# Patient Record
Sex: FEMALE | Race: WHITE | NOT HISPANIC OR LATINO | Employment: OTHER | URBAN - METROPOLITAN AREA
[De-identification: names, ages, dates, MRNs, and addresses within clinical notes are randomized per-mention and may not be internally consistent; named-entity substitution may affect disease eponyms.]

---

## 2017-10-18 ENCOUNTER — HISTORICAL (OUTPATIENT)
Dept: RADIOLOGY | Facility: HOSPITAL | Age: 32
End: 2017-10-18

## 2017-12-15 ENCOUNTER — HISTORICAL (OUTPATIENT)
Dept: BARIATRICS | Facility: HOSPITAL | Age: 32
End: 2017-12-15

## 2017-12-15 ENCOUNTER — HISTORICAL (OUTPATIENT)
Dept: PREADMISSION TESTING | Facility: HOSPITAL | Age: 32
End: 2017-12-15

## 2017-12-15 LAB — H PYLORI AB SER IA-ACNC: NEGATIVE

## 2018-03-15 ENCOUNTER — HISTORICAL (OUTPATIENT)
Dept: SURGERY | Facility: HOSPITAL | Age: 33
End: 2018-03-15

## 2018-03-20 ENCOUNTER — HISTORICAL (OUTPATIENT)
Dept: LAB | Facility: HOSPITAL | Age: 33
End: 2018-03-20

## 2018-03-20 ENCOUNTER — HISTORICAL (OUTPATIENT)
Dept: BARIATRICS | Facility: HOSPITAL | Age: 33
End: 2018-03-20

## 2018-03-20 LAB
ABS NEUT (OLG): 5.35 X10(3)/MCL (ref 2.1–9.2)
ALBUMIN SERPL-MCNC: 3.6 GM/DL (ref 3.4–5)
ALBUMIN/GLOB SERPL: 0.9 {RATIO}
ALP SERPL-CCNC: 87 UNIT/L (ref 38–126)
ALT SERPL-CCNC: 73 UNIT/L (ref 12–78)
APTT PPP: 33.6 SECOND(S) (ref 24.8–36.9)
AST SERPL-CCNC: 59 UNIT/L (ref 15–37)
BASOPHILS # BLD AUTO: 0 X10(3)/MCL (ref 0–0.2)
BASOPHILS NFR BLD AUTO: 0 %
BILIRUB SERPL-MCNC: 0.6 MG/DL (ref 0.2–1)
BILIRUBIN DIRECT+TOT PNL SERPL-MCNC: 0.1 MG/DL (ref 0–0.5)
BILIRUBIN DIRECT+TOT PNL SERPL-MCNC: 0.5 MG/DL (ref 0–0.8)
BUN SERPL-MCNC: 10 MG/DL (ref 7–18)
CALCIUM SERPL-MCNC: 9 MG/DL (ref 8.5–10.1)
CHLORIDE SERPL-SCNC: 106 MMOL/L (ref 98–107)
CO2 SERPL-SCNC: 23 MMOL/L (ref 21–32)
CREAT SERPL-MCNC: 0.63 MG/DL (ref 0.55–1.02)
EOSINOPHIL # BLD AUTO: 0 X10(3)/MCL (ref 0–0.9)
EOSINOPHIL NFR BLD AUTO: 0 %
ERYTHROCYTE [DISTWIDTH] IN BLOOD BY AUTOMATED COUNT: 12.3 % (ref 11.5–17)
GLOBULIN SER-MCNC: 3.8 GM/DL (ref 2.4–3.5)
GLUCOSE SERPL-MCNC: 92 MG/DL (ref 74–106)
HCT VFR BLD AUTO: 41.3 % (ref 37–47)
HGB BLD-MCNC: 13.1 GM/DL (ref 12–16)
LYMPHOCYTES # BLD AUTO: 2.1 X10(3)/MCL (ref 0.6–4.6)
LYMPHOCYTES NFR BLD AUTO: 27 %
MCH RBC QN AUTO: 30.1 PG (ref 27–31)
MCHC RBC AUTO-ENTMCNC: 31.7 GM/DL (ref 33–36)
MCV RBC AUTO: 94.9 FL (ref 80–94)
MONOCYTES # BLD AUTO: 0.3 X10(3)/MCL (ref 0.1–1.3)
MONOCYTES NFR BLD AUTO: 4 %
NEUTROPHILS # BLD AUTO: 5.35 X10(3)/MCL (ref 1.4–7.9)
NEUTROPHILS NFR BLD AUTO: 68 %
PLATELET # BLD AUTO: 284 X10(3)/MCL (ref 130–400)
PMV BLD AUTO: 10.1 FL (ref 9.4–12.4)
POTASSIUM SERPL-SCNC: 4.5 MMOL/L (ref 3.5–5.1)
PROT SERPL-MCNC: 7.4 GM/DL (ref 6.4–8.2)
RBC # BLD AUTO: 4.35 X10(6)/MCL (ref 4.2–5.4)
SODIUM SERPL-SCNC: 137 MMOL/L (ref 136–145)
WBC # SPEC AUTO: 7.9 X10(3)/MCL (ref 4.5–11.5)

## 2018-04-02 ENCOUNTER — HISTORICAL (OUTPATIENT)
Dept: BARIATRICS | Facility: HOSPITAL | Age: 33
End: 2018-04-02

## 2018-04-23 ENCOUNTER — HISTORICAL (OUTPATIENT)
Dept: BARIATRICS | Facility: HOSPITAL | Age: 33
End: 2018-04-23

## 2018-05-30 ENCOUNTER — HISTORICAL (OUTPATIENT)
Dept: PREADMISSION TESTING | Facility: HOSPITAL | Age: 33
End: 2018-05-30

## 2018-05-30 LAB
ABS NEUT (OLG): 3.12 X10(3)/MCL (ref 2.1–9.2)
ALBUMIN SERPL-MCNC: 3.5 GM/DL (ref 3.4–5)
ALBUMIN/GLOB SERPL: 1.1 {RATIO}
ALP SERPL-CCNC: 88 UNIT/L (ref 38–126)
ALT SERPL-CCNC: 62 UNIT/L (ref 12–78)
AST SERPL-CCNC: 33 UNIT/L (ref 15–37)
BASOPHILS # BLD AUTO: 0 X10(3)/MCL (ref 0–0.2)
BASOPHILS NFR BLD AUTO: 0 %
BILIRUB SERPL-MCNC: 0.5 MG/DL (ref 0.2–1)
BILIRUBIN DIRECT+TOT PNL SERPL-MCNC: 0.2 MG/DL (ref 0–0.2)
BILIRUBIN DIRECT+TOT PNL SERPL-MCNC: 0.3 MG/DL (ref 0–0.8)
BUN SERPL-MCNC: 6 MG/DL (ref 7–18)
CALCIUM SERPL-MCNC: 8.7 MG/DL (ref 8.5–10.1)
CHLORIDE SERPL-SCNC: 106 MMOL/L (ref 98–107)
CO2 SERPL-SCNC: 25 MMOL/L (ref 21–32)
CREAT SERPL-MCNC: 0.57 MG/DL (ref 0.55–1.02)
EOSINOPHIL # BLD AUTO: 0 X10(3)/MCL (ref 0–0.9)
EOSINOPHIL NFR BLD AUTO: 1 %
ERYTHROCYTE [DISTWIDTH] IN BLOOD BY AUTOMATED COUNT: 14.7 % (ref 11.5–17)
GLOBULIN SER-MCNC: 3.1 GM/DL (ref 2.4–3.5)
GLUCOSE SERPL-MCNC: 77 MG/DL (ref 74–106)
HCT VFR BLD AUTO: 42.2 % (ref 37–47)
HGB BLD-MCNC: 13 GM/DL (ref 12–16)
IRON SATN MFR SERPL: 26.4 % (ref 20–50)
IRON SERPL-MCNC: 71 MCG/DL (ref 50–175)
LYMPHOCYTES # BLD AUTO: 2.2 X10(3)/MCL (ref 0.6–4.6)
LYMPHOCYTES NFR BLD AUTO: 38 %
MCH RBC QN AUTO: 30.3 PG (ref 27–31)
MCHC RBC AUTO-ENTMCNC: 30.8 GM/DL (ref 33–36)
MCV RBC AUTO: 98.4 FL (ref 80–94)
MONOCYTES # BLD AUTO: 0.4 X10(3)/MCL (ref 0.1–1.3)
MONOCYTES NFR BLD AUTO: 7 %
NEUTROPHILS # BLD AUTO: 3.12 X10(3)/MCL (ref 2.1–9.2)
NEUTROPHILS NFR BLD AUTO: 53 %
PLATELET # BLD AUTO: 201 X10(3)/MCL (ref 130–400)
PMV BLD AUTO: 12.3 FL (ref 9.4–12.4)
POTASSIUM SERPL-SCNC: 4.6 MMOL/L (ref 3.5–5.1)
PROT SERPL-MCNC: 6.6 GM/DL (ref 6.4–8.2)
RBC # BLD AUTO: 4.29 X10(6)/MCL (ref 4.2–5.4)
SODIUM SERPL-SCNC: 142 MMOL/L (ref 136–145)
TIBC SERPL-MCNC: 269 MCG/DL (ref 250–450)
TRANSFERRIN SERPL-MCNC: 218 MG/DL (ref 200–360)
VIT B12 SERPL-MCNC: 728 PG/ML (ref 193–986)
WBC # SPEC AUTO: 5.9 X10(3)/MCL (ref 4.5–11.5)

## 2018-06-11 ENCOUNTER — HISTORICAL (OUTPATIENT)
Dept: BARIATRICS | Facility: HOSPITAL | Age: 33
End: 2018-06-11

## 2018-08-10 ENCOUNTER — HISTORICAL (OUTPATIENT)
Dept: BARIATRICS | Facility: HOSPITAL | Age: 33
End: 2018-08-10

## 2018-08-29 ENCOUNTER — HISTORICAL (OUTPATIENT)
Dept: PREADMISSION TESTING | Facility: HOSPITAL | Age: 33
End: 2018-08-29

## 2018-08-29 LAB
ALBUMIN SERPL-MCNC: 3.3 GM/DL (ref 3.4–5)
ALBUMIN/GLOB SERPL: 0.9 RATIO (ref 1.1–2)
ALP SERPL-CCNC: 68 UNIT/L (ref 38–126)
ALT SERPL-CCNC: 32 UNIT/L (ref 12–78)
AST SERPL-CCNC: 25 UNIT/L (ref 15–37)
BILIRUB SERPL-MCNC: 0.5 MG/DL (ref 0.2–1)
BILIRUBIN DIRECT+TOT PNL SERPL-MCNC: 0.2 MG/DL (ref 0–0.5)
BILIRUBIN DIRECT+TOT PNL SERPL-MCNC: 0.3 MG/DL (ref 0–0.8)
BUN SERPL-MCNC: 9 MG/DL (ref 7–18)
CALCIUM SERPL-MCNC: 9.1 MG/DL (ref 8.5–10.1)
CHLORIDE SERPL-SCNC: 106 MMOL/L (ref 98–107)
CO2 SERPL-SCNC: 23 MMOL/L (ref 21–32)
CREAT SERPL-MCNC: 0.57 MG/DL (ref 0.55–1.02)
GLOBULIN SER-MCNC: 3.5 GM/DL (ref 2.4–3.5)
GLUCOSE SERPL-MCNC: 79 MG/DL (ref 74–106)
POTASSIUM SERPL-SCNC: 4.4 MMOL/L (ref 3.5–5.1)
PROT SERPL-MCNC: 6.8 GM/DL (ref 6.4–8.2)
SODIUM SERPL-SCNC: 139 MMOL/L (ref 136–145)

## 2018-10-15 ENCOUNTER — HISTORICAL (OUTPATIENT)
Dept: ADMINISTRATIVE | Facility: HOSPITAL | Age: 33
End: 2018-10-15

## 2018-10-15 LAB
ALBUMIN SERPL-MCNC: 3.2 GM/DL (ref 3.4–5)
ALBUMIN/GLOB SERPL: 1.1 {RATIO}
ALP SERPL-CCNC: 58 UNIT/L (ref 38–126)
ALT SERPL-CCNC: 25 UNIT/L (ref 12–78)
AST SERPL-CCNC: 21 UNIT/L (ref 15–37)
BILIRUB SERPL-MCNC: 0.3 MG/DL (ref 0.2–1)
BILIRUBIN DIRECT+TOT PNL SERPL-MCNC: 0.1 MG/DL (ref 0–0.2)
BILIRUBIN DIRECT+TOT PNL SERPL-MCNC: 0.2 MG/DL (ref 0–0.8)
BUN SERPL-MCNC: 10 MG/DL (ref 7–18)
CALCIUM SERPL-MCNC: 8.8 MG/DL (ref 8.5–10.1)
CHLORIDE SERPL-SCNC: 109 MMOL/L (ref 98–107)
CO2 SERPL-SCNC: 30 MMOL/L (ref 21–32)
CREAT SERPL-MCNC: 0.4 MG/DL (ref 0.55–1.02)
ERYTHROCYTE [DISTWIDTH] IN BLOOD BY AUTOMATED COUNT: 12.9 % (ref 11.5–17)
GLOBULIN SER-MCNC: 3 GM/DL (ref 2.4–3.5)
GLUCOSE SERPL-MCNC: 73 MG/DL (ref 74–106)
HCT VFR BLD AUTO: 36.4 % (ref 37–47)
HGB BLD-MCNC: 11.6 GM/DL (ref 12–16)
IRON SATN MFR SERPL: 19.6 % (ref 20–50)
IRON SERPL-MCNC: 50 MCG/DL (ref 50–175)
MCH RBC QN AUTO: 33 PG (ref 27–31)
MCHC RBC AUTO-ENTMCNC: 31.9 GM/DL (ref 33–36)
MCV RBC AUTO: 103.4 FL (ref 80–94)
PLATELET # BLD AUTO: 179 X10(3)/MCL (ref 130–400)
PMV BLD AUTO: 11.7 FL (ref 9.4–12.4)
POTASSIUM SERPL-SCNC: 4.6 MMOL/L (ref 3.5–5.1)
PROT SERPL-MCNC: 6.2 GM/DL (ref 6.4–8.2)
RBC # BLD AUTO: 3.52 X10(6)/MCL (ref 4.2–5.4)
SODIUM SERPL-SCNC: 145 MMOL/L (ref 136–145)
TIBC SERPL-MCNC: 255 MCG/DL (ref 250–450)
TRANSFERRIN SERPL-MCNC: 193 MG/DL (ref 200–360)
VIT B12 SERPL-MCNC: 2741 PG/ML (ref 193–986)
WBC # SPEC AUTO: 4.5 X10(3)/MCL (ref 4.5–11.5)

## 2018-10-23 ENCOUNTER — HISTORICAL (OUTPATIENT)
Dept: BARIATRICS | Facility: HOSPITAL | Age: 33
End: 2018-10-23

## 2019-01-11 ENCOUNTER — HISTORICAL (OUTPATIENT)
Dept: BARIATRICS | Facility: HOSPITAL | Age: 34
End: 2019-01-11

## 2019-03-26 ENCOUNTER — HISTORICAL (OUTPATIENT)
Dept: PREADMISSION TESTING | Facility: HOSPITAL | Age: 34
End: 2019-03-26

## 2019-03-26 LAB
ABS NEUT (OLG): 4.69 X10(3)/MCL (ref 2.1–9.2)
ALBUMIN SERPL-MCNC: 3.9 GM/DL (ref 3.4–5)
ALBUMIN/GLOB SERPL: 1.3 {RATIO}
ALP SERPL-CCNC: 56 UNIT/L (ref 38–126)
ALT SERPL-CCNC: 22 UNIT/L (ref 12–78)
AST SERPL-CCNC: 11 UNIT/L (ref 15–37)
BASOPHILS # BLD AUTO: 0 X10(3)/MCL (ref 0–0.2)
BASOPHILS NFR BLD AUTO: 0 %
BILIRUB SERPL-MCNC: 0.4 MG/DL (ref 0.2–1)
BILIRUBIN DIRECT+TOT PNL SERPL-MCNC: 0.1 MG/DL (ref 0–0.2)
BILIRUBIN DIRECT+TOT PNL SERPL-MCNC: 0.3 MG/DL (ref 0–0.8)
BUN SERPL-MCNC: 14 MG/DL (ref 7–18)
CALCIUM SERPL-MCNC: 9.2 MG/DL (ref 8.5–10.1)
CHLORIDE SERPL-SCNC: 109 MMOL/L (ref 98–107)
CO2 SERPL-SCNC: 28 MMOL/L (ref 21–32)
CREAT SERPL-MCNC: 0.68 MG/DL (ref 0.55–1.02)
EOSINOPHIL # BLD AUTO: 0 X10(3)/MCL (ref 0–0.9)
EOSINOPHIL NFR BLD AUTO: 0 %
ERYTHROCYTE [DISTWIDTH] IN BLOOD BY AUTOMATED COUNT: 12.2 % (ref 11.5–17)
GLOBULIN SER-MCNC: 3 GM/DL (ref 2.4–3.5)
GLUCOSE SERPL-MCNC: 71 MG/DL (ref 74–106)
HCT VFR BLD AUTO: 39.2 % (ref 37–47)
HGB BLD-MCNC: 12.6 GM/DL (ref 12–16)
IRON SATN MFR SERPL: 36.2 % (ref 20–50)
IRON SERPL-MCNC: 104 MCG/DL (ref 50–175)
LYMPHOCYTES # BLD AUTO: 1.9 X10(3)/MCL (ref 0.6–4.6)
LYMPHOCYTES NFR BLD AUTO: 27 %
MCH RBC QN AUTO: 33.2 PG (ref 27–31)
MCHC RBC AUTO-ENTMCNC: 32.1 GM/DL (ref 33–36)
MCV RBC AUTO: 103.4 FL (ref 80–94)
MONOCYTES # BLD AUTO: 0.3 X10(3)/MCL (ref 0.1–1.3)
MONOCYTES NFR BLD AUTO: 5 %
NEUTROPHILS # BLD AUTO: 4.69 X10(3)/MCL (ref 2.1–9.2)
NEUTROPHILS NFR BLD AUTO: 67 %
PLATELET # BLD AUTO: 219 X10(3)/MCL (ref 130–400)
PMV BLD AUTO: 11.3 FL (ref 9.4–12.4)
POTASSIUM SERPL-SCNC: 4.6 MMOL/L (ref 3.5–5.1)
PROT SERPL-MCNC: 6.9 GM/DL (ref 6.4–8.2)
RBC # BLD AUTO: 3.79 X10(6)/MCL (ref 4.2–5.4)
SODIUM SERPL-SCNC: 141 MMOL/L (ref 136–145)
TIBC SERPL-MCNC: 287 MCG/DL (ref 250–450)
TRANSFERRIN SERPL-MCNC: 224 MG/DL (ref 200–360)
VIT B12 SERPL-MCNC: 805 PG/ML (ref 193–986)
WBC # SPEC AUTO: 7 X10(3)/MCL (ref 4.5–11.5)

## 2019-04-08 ENCOUNTER — HISTORICAL (OUTPATIENT)
Dept: BARIATRICS | Facility: HOSPITAL | Age: 34
End: 2019-04-08

## 2019-10-08 ENCOUNTER — HISTORICAL (OUTPATIENT)
Dept: PREADMISSION TESTING | Facility: HOSPITAL | Age: 34
End: 2019-10-08

## 2019-10-08 LAB
ABS NEUT (OLG): 3.24 X10(3)/MCL (ref 2.1–9.2)
ALBUMIN SERPL-MCNC: 3.9 GM/DL (ref 3.4–5)
ALBUMIN/GLOB SERPL: 1.3 RATIO (ref 1.1–2)
ALP SERPL-CCNC: 48 UNIT/L (ref 38–126)
ALT SERPL-CCNC: 26 UNIT/L (ref 12–78)
AST SERPL-CCNC: 12 UNIT/L (ref 15–37)
BASOPHILS # BLD AUTO: 0 X10(3)/MCL (ref 0–0.2)
BASOPHILS NFR BLD AUTO: 0 %
BILIRUB SERPL-MCNC: 0.7 MG/DL (ref 0.2–1)
BILIRUBIN DIRECT+TOT PNL SERPL-MCNC: 0.2 MG/DL (ref 0–0.5)
BILIRUBIN DIRECT+TOT PNL SERPL-MCNC: 0.5 MG/DL (ref 0–0.8)
BUN SERPL-MCNC: 14 MG/DL (ref 7–18)
CALCIUM SERPL-MCNC: 9.3 MG/DL (ref 8.5–10.1)
CHLORIDE SERPL-SCNC: 104 MMOL/L (ref 98–107)
CO2 SERPL-SCNC: 30 MMOL/L (ref 21–32)
CREAT SERPL-MCNC: 0.57 MG/DL (ref 0.55–1.02)
EOSINOPHIL # BLD AUTO: 0 X10(3)/MCL (ref 0–0.9)
EOSINOPHIL NFR BLD AUTO: 0 %
ERYTHROCYTE [DISTWIDTH] IN BLOOD BY AUTOMATED COUNT: 12.3 % (ref 11.5–17)
GLOBULIN SER-MCNC: 2.9 GM/DL (ref 2.4–3.5)
GLUCOSE SERPL-MCNC: 81 MG/DL (ref 74–106)
HCT VFR BLD AUTO: 41.5 % (ref 37–47)
HGB BLD-MCNC: 13 GM/DL (ref 12–16)
IRON SATN MFR SERPL: 33.1 % (ref 20–50)
IRON SERPL-MCNC: 100 MCG/DL (ref 50–175)
LYMPHOCYTES # BLD AUTO: 2.2 X10(3)/MCL (ref 0.6–4.6)
LYMPHOCYTES NFR BLD AUTO: 37 %
MCH RBC QN AUTO: 31.5 PG (ref 27–31)
MCHC RBC AUTO-ENTMCNC: 31.3 GM/DL (ref 33–36)
MCV RBC AUTO: 100.5 FL (ref 80–94)
MONOCYTES # BLD AUTO: 0.3 X10(3)/MCL (ref 0.1–1.3)
MONOCYTES NFR BLD AUTO: 6 %
NEUTROPHILS # BLD AUTO: 3.24 X10(3)/MCL (ref 2.1–9.2)
NEUTROPHILS NFR BLD AUTO: 56 %
PLATELET # BLD AUTO: 212 X10(3)/MCL (ref 130–400)
PMV BLD AUTO: 10.3 FL (ref 9.4–12.4)
POTASSIUM SERPL-SCNC: 4.9 MMOL/L (ref 3.5–5.1)
PROT SERPL-MCNC: 6.8 GM/DL (ref 6.4–8.2)
RBC # BLD AUTO: 4.13 X10(6)/MCL (ref 4.2–5.4)
SODIUM SERPL-SCNC: 141 MMOL/L (ref 136–145)
TIBC SERPL-MCNC: 302 MCG/DL (ref 250–450)
TRANSFERRIN SERPL-MCNC: 236 MG/DL (ref 200–360)
VIT B12 SERPL-MCNC: 1068 PG/ML (ref 193–986)
WBC # SPEC AUTO: 5.8 X10(3)/MCL (ref 4.5–11.5)

## 2020-12-07 ENCOUNTER — HISTORICAL (OUTPATIENT)
Dept: SURGERY | Facility: HOSPITAL | Age: 35
End: 2020-12-07

## 2021-05-12 ENCOUNTER — HISTORICAL (OUTPATIENT)
Dept: PREADMISSION TESTING | Facility: HOSPITAL | Age: 36
End: 2021-05-12

## 2021-05-12 LAB
ABS NEUT (OLG): 3.62 X10(3)/MCL (ref 2.1–9.2)
ALBUMIN SERPL-MCNC: 4.3 GM/DL (ref 3.5–5)
ALBUMIN/GLOB SERPL: 1.4 RATIO (ref 1.1–2)
ALP SERPL-CCNC: 62 UNIT/L (ref 40–150)
ALT SERPL-CCNC: 20 UNIT/L (ref 0–55)
AST SERPL-CCNC: 17 UNIT/L (ref 5–34)
BASOPHILS # BLD AUTO: 0 X10(3)/MCL (ref 0–0.2)
BASOPHILS NFR BLD AUTO: 1 %
BILIRUB SERPL-MCNC: 0.8 MG/DL
BILIRUBIN DIRECT+TOT PNL SERPL-MCNC: 0.3 MG/DL (ref 0–0.5)
BILIRUBIN DIRECT+TOT PNL SERPL-MCNC: 0.5 MG/DL (ref 0–0.8)
BUN SERPL-MCNC: 14.7 MG/DL (ref 7–18.7)
CALCIUM SERPL-MCNC: 10 MG/DL (ref 8.4–10.2)
CHLORIDE SERPL-SCNC: 106 MMOL/L (ref 98–107)
CO2 SERPL-SCNC: 26 MMOL/L (ref 22–29)
CREAT SERPL-MCNC: 0.73 MG/DL (ref 0.55–1.02)
EOSINOPHIL # BLD AUTO: 0 X10(3)/MCL (ref 0–0.9)
EOSINOPHIL NFR BLD AUTO: 0 %
ERYTHROCYTE [DISTWIDTH] IN BLOOD BY AUTOMATED COUNT: 12.9 % (ref 11.5–17)
GLOBULIN SER-MCNC: 3 GM/DL (ref 2.4–3.5)
GLUCOSE SERPL-MCNC: 99 MG/DL (ref 74–100)
HCT VFR BLD AUTO: 44.2 % (ref 37–47)
HGB BLD-MCNC: 14.3 GM/DL (ref 12–16)
IRON SATN MFR SERPL: 44 % (ref 20–50)
IRON SERPL-MCNC: 138 UG/DL (ref 50–170)
LYMPHOCYTES # BLD AUTO: 2 X10(3)/MCL (ref 0.6–4.6)
LYMPHOCYTES NFR BLD AUTO: 33 %
MCH RBC QN AUTO: 33.5 PG (ref 27–31)
MCHC RBC AUTO-ENTMCNC: 32.4 GM/DL (ref 33–36)
MCV RBC AUTO: 103.5 FL (ref 80–94)
MONOCYTES # BLD AUTO: 0.4 X10(3)/MCL (ref 0.1–1.3)
MONOCYTES NFR BLD AUTO: 6 %
NEUTROPHILS # BLD AUTO: 3.62 X10(3)/MCL (ref 2.1–9.2)
NEUTROPHILS NFR BLD AUTO: 60 %
PLATELET # BLD AUTO: 301 X10(3)/MCL (ref 130–400)
PMV BLD AUTO: 9.4 FL (ref 9.4–12.4)
POTASSIUM SERPL-SCNC: 5 MMOL/L (ref 3.5–5.1)
PROT SERPL-MCNC: 7.3 GM/DL (ref 6.4–8.3)
RBC # BLD AUTO: 4.27 X10(6)/MCL (ref 4.2–5.4)
SODIUM SERPL-SCNC: 139 MMOL/L (ref 136–145)
TIBC SERPL-MCNC: 173 UG/DL (ref 70–310)
TIBC SERPL-MCNC: 311 UG/DL (ref 250–450)
TRANSFERRIN SERPL-MCNC: 274 MG/DL (ref 180–382)
VIT B12 SERPL-MCNC: 1160 PG/ML (ref 213–816)
WBC # SPEC AUTO: 6.1 X10(3)/MCL (ref 4.5–11.5)

## 2022-04-08 ENCOUNTER — HISTORICAL (OUTPATIENT)
Dept: ADMINISTRATIVE | Facility: HOSPITAL | Age: 37
End: 2022-04-08

## 2022-04-30 NOTE — OP NOTE
Patient:   Carol Edmond            MRN: [id number]            FIN: [id number]               Age:   32 years     Sex:  Female     :  1985   Associated Diagnoses:   Obesity; Hiatal hernia   Author:   Charly Gerard MD      Operative Note   Operative Information   Date/ Time:  3/15/2018 13:14:00.     Procedures Performed: EGD  (esophagogastroduodenoscopy).     Indications: preoperative bariatricu surgery workup  gerd.     Preoperative Diagnosis: Obesity (EMN08-UV E66.9).     Postoperative Diagnosis: Obesity (QJG23-NV E66.9), Hiatal hernia (SQM04-AQ K44.9).     Surgeon: Charly Gerard MD.     Anesthesia: mac.     Speciman Removed: none.     Description of Procedure/Findings/    Complications: pt laid in left lateral decubitus position and monitored sedation administered by anesthesia    enteroscope advance into patients mouth through bite block    esohagus normal appearing, normal mucosa    stomach appeared normal,     no gastric ulcers    scope then advaced in duodenum, duodenum normal appearing mucosa,    the stomach was inflated and retroflexed view revealed a 3 cm hiatal hernia      the air was suctioned out and the scope was withdrawn    pt taken to pacu in stable satisfactory condition..     Esimated blood loss: No blood loss.     Findings: 3cm hiatal hernia.     Complications: None.

## 2022-05-10 ENCOUNTER — HOSPITAL ENCOUNTER (EMERGENCY)
Facility: HOSPITAL | Age: 37
Discharge: HOME | End: 2022-05-11
Attending: EMERGENCY MEDICINE
Payer: COMMERCIAL

## 2022-05-10 DIAGNOSIS — M54.50 CHRONIC BILATERAL LOW BACK PAIN WITHOUT SCIATICA: Primary | ICD-10-CM

## 2022-05-10 DIAGNOSIS — G89.29 CHRONIC BILATERAL LOW BACK PAIN WITHOUT SCIATICA: Primary | ICD-10-CM

## 2022-05-10 PROCEDURE — 99285 EMERGENCY DEPT VISIT HI MDM: CPT

## 2022-05-10 RX ORDER — HYDROCODONE BITARTRATE AND ACETAMINOPHEN 10; 325 MG/1; MG/1
1 TABLET ORAL
Status: COMPLETED | OUTPATIENT
Start: 2022-05-10 | End: 2022-05-11

## 2022-05-10 NOTE — FIRST PROVIDER EVALUATION
Medical screening exam completed.  I have conducted a focused provider triage encounter, findings are as follows:    Brief history of present illness:  States lower back pain. States recent back surgery.     There were no vitals filed for this visit.    Pertinent physical exam:  Awake, alert, ambulatory    Brief workup plan:  ct    Preliminary workup initiated; this workup will be continued and followed by the physician or advanced practice provider that is assigned to the patient when roomed.

## 2022-05-11 VITALS
RESPIRATION RATE: 17 BRPM | TEMPERATURE: 98 F | OXYGEN SATURATION: 99 % | HEIGHT: 64 IN | WEIGHT: 190 LBS | SYSTOLIC BLOOD PRESSURE: 130 MMHG | HEART RATE: 75 BPM | BODY MASS INDEX: 32.44 KG/M2 | DIASTOLIC BLOOD PRESSURE: 90 MMHG

## 2022-05-11 PROCEDURE — 96372 THER/PROPH/DIAG INJ SC/IM: CPT | Performed by: NURSE PRACTITIONER

## 2022-05-11 PROCEDURE — 63600175 PHARM REV CODE 636 W HCPCS: Performed by: NURSE PRACTITIONER

## 2022-05-11 PROCEDURE — 25000003 PHARM REV CODE 250: Performed by: NURSE PRACTITIONER

## 2022-05-11 RX ORDER — HYDROCODONE BITARTRATE AND ACETAMINOPHEN 5; 325 MG/1; MG/1
1 TABLET ORAL EVERY 6 HOURS PRN
Qty: 15 TABLET | Refills: 0 | Status: SHIPPED | OUTPATIENT
Start: 2022-05-11 | End: 2022-07-03 | Stop reason: ALTCHOICE

## 2022-05-11 RX ORDER — TRAMADOL HYDROCHLORIDE 50 MG/1
50 TABLET ORAL EVERY 6 HOURS PRN
Qty: 12 TABLET | Refills: 0 | Status: SHIPPED | OUTPATIENT
Start: 2022-05-11 | End: 2022-05-11 | Stop reason: ALTCHOICE

## 2022-05-11 RX ORDER — HYDROCODONE BITARTRATE AND ACETAMINOPHEN 5; 325 MG/1; MG/1
1 TABLET ORAL EVERY 6 HOURS PRN
Qty: 14 TABLET | Refills: 0 | Status: SHIPPED | OUTPATIENT
Start: 2022-05-11 | End: 2022-05-11 | Stop reason: SDUPTHER

## 2022-05-11 RX ORDER — HYDROMORPHONE HYDROCHLORIDE 2 MG/ML
1 INJECTION, SOLUTION INTRAMUSCULAR; INTRAVENOUS; SUBCUTANEOUS
Status: COMPLETED | OUTPATIENT
Start: 2022-05-11 | End: 2022-05-11

## 2022-05-11 RX ORDER — CYCLOBENZAPRINE HCL 10 MG
10 TABLET ORAL 3 TIMES DAILY PRN
Qty: 15 TABLET | Refills: 0 | Status: SHIPPED | OUTPATIENT
Start: 2022-05-11 | End: 2022-05-16

## 2022-05-11 RX ADMIN — HYDROCODONE BITARTRATE AND ACETAMINOPHEN 1 TABLET: 10; 325 TABLET ORAL at 12:05

## 2022-05-11 RX ADMIN — HYDROMORPHONE HYDROCHLORIDE 1 MG: 2 INJECTION INTRAMUSCULAR; INTRAVENOUS; SUBCUTANEOUS at 12:05

## 2022-05-11 NOTE — ED NOTES
Pt to ED with complaints of 10/10 lower back pain; recent back Sx to replace disc in lower back. Pt reports increased pain post op. Seen by neurologist on 05/10/2022, told needs an MRI (has not been scheduled yet). Pt had appointment with pain management, but not until 05/19/2022. Called PCP as well to try to get pain medication, advised to come to ED. Denies bladder/bowel incontinence. Pt able to walk WNL. Pt aaox4, GRACE: pt updated on plan of care, verbalizes understanding

## 2022-05-11 NOTE — ED PROVIDER NOTES
Encounter Date: 5/10/2022       History     Chief Complaint   Patient presents with    Back Pain     Patient reports lower back pain, spinal fusion March 22 Dr Greer, sent from his office, here for pain control, seen at [facility] yesterday     Patient here with lower back pain. She had surgery on her back 3/7/22.     The history is provided by the patient. No  was used.   Back Pain   This is a recurrent problem. The current episode started several weeks ago. The problem occurs constantly. The problem has been unchanged. The pain is associated with falling. The pain is present in the lumbar spine. The quality of the pain is described as shooting. The pain radiates to the left leg and right leg. The pain is at a severity of 8/10. The symptoms are aggravated by certain positions. Stiffness is present all day. Pertinent negatives include no chest pain, no fever, no headaches, no abdominal pain and no dysuria. She has tried nothing for the symptoms. The treatment provided no relief. Risk factors include obesity.     Review of patient's allergies indicates:   Allergen Reactions    Nsaids (non-steroidal anti-inflammatory drug)      Past Medical History:   Diagnosis Date    H/O gastric sleeve      Past Surgical History:   Procedure Laterality Date    BACK SURGERY      SPINAL FUSION       History reviewed. No pertinent family history.  Social History     Tobacco Use    Smoking status: Never Smoker    Smokeless tobacco: Never Used   Substance Use Topics    Alcohol use: Not Currently     Review of Systems   Constitutional: Negative for fever.   Respiratory: Negative for cough and shortness of breath.    Cardiovascular: Negative for chest pain.   Gastrointestinal: Negative for abdominal pain.   Genitourinary: Negative for difficulty urinating and dysuria.   Musculoskeletal: Positive for back pain. Negative for gait problem.   Skin: Negative for color change.   Neurological: Negative for dizziness,  speech difficulty and headaches.   Psychiatric/Behavioral: Negative for hallucinations and suicidal ideas.   All other systems reviewed and are negative.      Physical Exam     Initial Vitals [05/10/22 1421]   BP Pulse Resp Temp SpO2   127/72 90 18 98.4 °F (36.9 °C) 100 %      MAP       --         Physical Exam    Nursing note and vitals reviewed.  Constitutional: She appears well-developed and well-nourished.   HENT:   Head: Normocephalic.   Eyes: EOM are normal.   Neck:   Normal range of motion.  Cardiovascular: Normal rate, regular rhythm and intact distal pulses.   Pulmonary/Chest: Breath sounds normal. No respiratory distress.   Abdominal: Abdomen is soft. Bowel sounds are normal. There is no abdominal tenderness.   Musculoskeletal:         General: Normal range of motion.      Cervical back: Normal range of motion.     Neurological: She is alert and oriented to person, place, and time. She has normal strength and normal reflexes.   Skin: Skin is warm and dry.   Psychiatric: She has a normal mood and affect. Her behavior is normal. Judgment and thought content normal.         ED Course   Procedures  Labs Reviewed   HCG QUALITATIVE URINE          Imaging Results          CT Lumbar Spine Without Contrast (Preliminary result)  Result time 05/11/22 02:12:39    Preliminary result by Sung Vann Jr., MD (05/11/22 02:09:37)                 Narrative:    START OF REPORT:  Technique: CT of the lumbar spine was performed without intravenous contrast with direct axial as well as sagittal and coronal reconstruction images.    Comparison: None.    Clinical history: Fall previous back surgery.    Findings:  Anatomy: Unremarkable.  Mineralization: The bony mineralization is within normal limits.  Bone alignment: Unremarkable with no listhesis.  Curvature: Lumbar lordosis is maintained.  Bone and bone marrow: Vertebral body heights are maintained.  Intervertebral disc spaces: Preserved throughout.  Osteophytes: Small  anterior marginal osteophytes at T12 down through L5.  Facet degenerative changes: Multilevel facet degenerative changes are seen.  Spinal canal:  Fractures: No acute fracture dislocation or subluxation is seen.  Orthopedic Hardware: An interbody cage device is seen at L5-S1.      Impression:  1. No acute fracture dislocation or subluxation is seen.  2. Details and other findings as noted above.                                   Medications   HYDROcodone-acetaminophen  mg per tablet 1 tablet (1 tablet Oral Given 5/11/22 0008)   HYDROmorphone (PF) injection 1 mg (1 mg Intramuscular Given 5/11/22 0020)                 ED Course as of 05/11/22 0347   Wed May 11, 2022   0051 Care transferred to Dr Woods [CL]   0106 CT Lumbar Spine Without Contrast [KG]   0107 Patient states pain is much improved after pain meds given. [KG]   0107 Patient has no difficulty moving bilateral lower extremities, sensation is intact to both lower extremities.  Patient is in no apparent distress. [KG]      ED Course User Index  [CL] PHILIPPE Roa  [KG] Junior Woods MD             Clinical Impression:   Final diagnoses:  [M54.50, G89.29] Chronic bilateral low back pain without sciatica (Primary)          ED Disposition Condition    Discharge Stable        ED Prescriptions     Medication Sig Dispense Start Date End Date Auth. Provider    traMADoL (ULTRAM) 50 mg tablet  (Status: Discontinued) Take 1 tablet (50 mg total) by mouth every 6 (six) hours as needed for Pain. 12 tablet 5/11/2022 5/11/2022 Junoir Woods MD    cyclobenzaprine (FLEXERIL) 10 MG tablet Take 1 tablet (10 mg total) by mouth 3 (three) times daily as needed for Muscle spasms. 15 tablet 5/11/2022 5/16/2022 Junior Woods MD    HYDROcodone-acetaminophen (NORCO) 5-325 mg per tablet Take 1 tablet by mouth every 6 (six) hours as needed for Pain. 14 tablet 5/11/2022  Junior Woods MD        Follow-up Information     Follow up With Specialties Details  Why Contact Dasia Guzman MD Family Medicine Schedule an appointment as soon as possible for a visit   [address] [place] [id number] [phone]             Junior Woods MD  05/11/22 8746

## 2022-05-13 ENCOUNTER — HOSPITAL ENCOUNTER (EMERGENCY)
Facility: HOSPITAL | Age: 37
Discharge: HOME | End: 2022-05-13
Attending: INTERNAL MEDICINE
Payer: COMMERCIAL

## 2022-05-13 VITALS
SYSTOLIC BLOOD PRESSURE: 125 MMHG | HEIGHT: 65 IN | HEART RATE: 105 BPM | BODY MASS INDEX: 36.65 KG/M2 | WEIGHT: 220 LBS | OXYGEN SATURATION: 98 % | RESPIRATION RATE: 18 BRPM | DIASTOLIC BLOOD PRESSURE: 87 MMHG | TEMPERATURE: 98 F

## 2022-05-13 DIAGNOSIS — G89.29 CHRONIC BILATERAL LOW BACK PAIN WITH LEFT-SIDED SCIATICA: ICD-10-CM

## 2022-05-13 DIAGNOSIS — M54.42 CHRONIC BILATERAL LOW BACK PAIN WITH LEFT-SIDED SCIATICA: ICD-10-CM

## 2022-05-13 DIAGNOSIS — G89.18 POST-OPERATIVE PAIN: ICD-10-CM

## 2022-05-13 DIAGNOSIS — G89.29 CHRONIC LEFT-SIDED LOW BACK PAIN WITH LEFT-SIDED SCIATICA: Primary | ICD-10-CM

## 2022-05-13 DIAGNOSIS — M54.42 CHRONIC LEFT-SIDED LOW BACK PAIN WITH LEFT-SIDED SCIATICA: Primary | ICD-10-CM

## 2022-05-13 PROCEDURE — 99284 EMERGENCY DEPT VISIT MOD MDM: CPT

## 2022-05-13 PROCEDURE — 96372 THER/PROPH/DIAG INJ SC/IM: CPT | Performed by: NURSE PRACTITIONER

## 2022-05-13 PROCEDURE — 63600175 PHARM REV CODE 636 W HCPCS: Performed by: NURSE PRACTITIONER

## 2022-05-13 PROCEDURE — 25000003 PHARM REV CODE 250: Performed by: NURSE PRACTITIONER

## 2022-05-13 RX ORDER — HYDROMORPHONE HYDROCHLORIDE 2 MG/ML
1 INJECTION, SOLUTION INTRAMUSCULAR; INTRAVENOUS; SUBCUTANEOUS
Status: COMPLETED | OUTPATIENT
Start: 2022-05-13 | End: 2022-05-13

## 2022-05-13 RX ORDER — BUSPIRONE HYDROCHLORIDE 10 MG/1
10 TABLET ORAL 2 TIMES DAILY
COMMUNITY
Start: 2022-04-04 | End: 2022-07-19

## 2022-05-13 RX ORDER — GABAPENTIN 400 MG/1
400 CAPSULE ORAL EVERY 4 HOURS
COMMUNITY
Start: 2022-05-01 | End: 2022-06-26

## 2022-05-13 RX ORDER — METHOCARBAMOL 500 MG/1
1000 TABLET, FILM COATED ORAL
Status: COMPLETED | OUTPATIENT
Start: 2022-05-13 | End: 2022-05-13

## 2022-05-13 RX ORDER — LIDOCAINE 50 MG/G
PATCH TOPICAL
COMMUNITY
Start: 2021-12-08 | End: 2022-07-03

## 2022-05-13 RX ORDER — METAXALONE 800 MG/1
800 TABLET ORAL 3 TIMES DAILY PRN
COMMUNITY
Start: 2022-05-10 | End: 2022-06-26

## 2022-05-13 RX ORDER — DULOXETIN HYDROCHLORIDE 60 MG/1
60 CAPSULE, DELAYED RELEASE ORAL DAILY
COMMUNITY
Start: 2022-04-04 | End: 2023-07-13 | Stop reason: ALTCHOICE

## 2022-05-13 RX ORDER — HYDROCODONE BITARTRATE AND ACETAMINOPHEN 10; 325 MG/1; MG/1
1 TABLET ORAL EVERY 6 HOURS PRN
Qty: 20 TABLET | Refills: 0 | Status: SHIPPED | OUTPATIENT
Start: 2022-05-13 | End: 2022-06-02

## 2022-05-13 RX ORDER — DULOXETIN HYDROCHLORIDE 30 MG/1
30 CAPSULE, DELAYED RELEASE ORAL DAILY
COMMUNITY
Start: 2022-04-04 | End: 2023-07-13 | Stop reason: ALTCHOICE

## 2022-05-13 RX ORDER — OXYCODONE AND ACETAMINOPHEN 5; 325 MG/1; MG/1
1 TABLET ORAL
Status: COMPLETED | OUTPATIENT
Start: 2022-05-13 | End: 2022-05-13

## 2022-05-13 RX ORDER — TRAZODONE HYDROCHLORIDE 50 MG/1
50 TABLET ORAL NIGHTLY
COMMUNITY
Start: 2022-04-04 | End: 2023-07-13 | Stop reason: ALTCHOICE

## 2022-05-13 RX ADMIN — OXYCODONE HYDROCHLORIDE AND ACETAMINOPHEN 1 TABLET: 5; 325 TABLET ORAL at 09:05

## 2022-05-13 RX ADMIN — HYDROMORPHONE HYDROCHLORIDE 1 MG: 2 INJECTION, SOLUTION INTRAMUSCULAR; INTRAVENOUS; SUBCUTANEOUS at 08:05

## 2022-05-13 RX ADMIN — METHOCARBAMOL 1000 MG: 500 TABLET ORAL at 08:05

## 2022-05-14 NOTE — DISCHARGE INSTRUCTIONS
Continue to take your medications prescribed the way they are prescribed at home. Wear your brace. Keep scheduled follow up with dr. Morfin on Thursday. Call to verify when MRI is with dr. Urena office. May take the norco 10mg instead of the 5mg but use sparingly and only for very severe pain

## 2022-05-14 NOTE — ED PROVIDER NOTES
Encounter Date: 5/13/2022       History     Chief Complaint   Patient presents with    Back Pain     REPORTS BACK PAIN; HX OF SPINAL FUSION MARCH 7TH DR LINN     35 y/o female who presents with c/o continued low back pain since she had surgery (lumbar fusion) with Dr. Linn 3/5/22. She has fall 3 times since the surgery with last episode being Monday. Since Monday she has been seen at two hospitals and by Dr. Linn. She is awaiting MRI per Dr. Linn office to schedule and pain management appt this Thursday with Dr. Morfin. She was seen couple days ago and had CT lumbar without acute findings noted, given pain meds while in ER and discharged with norco 5mg and she is out of these. She is on skelaxin for muscle relaxers and still taking gabapentin. She states her pain has never gotten better since surgery. Her surgical sites are healed well. No fever. No loss of bowel/bladder. She still has some intermittent paresthesia in her legs which isn't new.     The history is provided by the patient. No  was used.   Back Pain   This is a recurrent problem. The current episode started several weeks ago. The problem occurs constantly. The problem has been waxing and waning. The pain is present in the lumbar spine. The quality of the pain is described as stabbing. The pain radiates to the left leg. The pain is at a severity of 8/10. The symptoms are aggravated by certain positions. She has tried analgesics, NSAIDs and muscle relaxants for the symptoms. The treatment provided mild relief. Risk factors include obesity.     Review of patient's allergies indicates:   Allergen Reactions    Nsaids (non-steroidal anti-inflammatory drug)      Past Medical History:   Diagnosis Date    H/O gastric sleeve      Past Surgical History:   Procedure Laterality Date    BACK SURGERY      SPINAL FUSION       No family history on file.  Social History     Tobacco Use    Smoking status: Never Smoker    Smokeless  tobacco: Never Used   Substance Use Topics    Alcohol use: Not Currently     Review of Systems   Respiratory: Negative.    Cardiovascular: Negative.    Gastrointestinal: Negative.    Musculoskeletal: Positive for back pain.   Skin: Negative.    Neurological: Negative.    All other systems reviewed and are negative.      Physical Exam     Initial Vitals [05/13/22 1857]   BP Pulse Resp Temp SpO2   125/87 105 18 98 °F (36.7 °C) 98 %      MAP       --         Physical Exam    Nursing note and vitals reviewed.  Constitutional: She appears well-developed and well-nourished.   Eyes: Conjunctivae are normal.   Neck:   Normal range of motion.  Cardiovascular: Normal rate, regular rhythm, normal heart sounds and intact distal pulses.   Musculoskeletal:      Cervical back: Normal and normal range of motion.      Thoracic back: Normal.      Lumbar back: Spasms and tenderness present. No swelling, edema or lacerations. Positive left straight leg raise test.      Comments: Bilateral dorsiflexion and plantar flexion equal and strong. No deficits. She ambulates.      Neurological: She is alert and oriented to person, place, and time. She has normal strength.   Skin: Skin is warm and dry. Capillary refill takes less than 2 seconds.   Psychiatric: She has a normal mood and affect.         ED Course   Procedures  Labs Reviewed - No data to display       Imaging Results    None          Medications   HYDROmorphone (PF) injection 1 mg (1 mg Intramuscular Given 5/13/22 2000)   methocarbamoL tablet 1,000 mg (1,000 mg Oral Given 5/13/22 2019)   oxyCODONE-acetaminophen 5-325 mg per tablet 1 tablet (1 tablet Oral Given 5/13/22 2109)     Medical Decision Making:   Clinical Tests:   Radiological Study: Reviewed  ED Management:  Reviewed CT lumbar w/o contrast from previous ER visit. There were no acute findings noted. Strength is good,     Additional MDM:   Differential Diagnosis:   Other: The following diagnoses were also considered and will  be evaluated: lumbar fracture, lumbar radiculopathy.           ED Course as of 05/13/22 2142   Fri May 13, 2022   2125 Pain is improving. Discussed plan to discharge with pain medication however she has an active norco 5mg RX still open and discussed pharmacy may not fill the new RX until that one has run out per the way it was written. Discussed that it is very important to f/u with dr. Urena for clarification and update on when she is scheduled for MRI and her f/u with pain management since ER is not for continued pain management. She verbalized understanding.  [EV]      ED Course User Index  [EV] PHILIPPE Ross             Clinical Impression:   Final diagnoses:  [M54.42, G89.29] Chronic left-sided low back pain with left-sided sciatica (Primary)  [M54.42, G89.29] Chronic bilateral low back pain with left-sided sciatica  [G89.18] Post-operative pain          ED Disposition Condition    Discharge Stable        ED Prescriptions     Medication Sig Dispense Start Date End Date Auth. Provider    HYDROcodone-acetaminophen (NORCO)  mg per tablet Take 1 tablet by mouth every 6 (six) hours as needed for Pain. 20 tablet 5/13/2022 6/2/2022 PHILIPPE Ross        Follow-up Information     Follow up With Specialties Details Why Contact Info    Riana Urena MD Neurosurgery Call  to follow up on appt to get your MRI [address]  [place] [id number] [phone]      Dr. Morfin (pain management)   keep scheduled appointment for thursday 5/19/22            PHILIPPE Ross  05/13/22 2143

## 2022-05-14 NOTE — ED NOTES
Pt with c/o lower back pain that radiates to bilateral legs along with numbness, and weakness. Pt fell Monday on 5/9. Pt describes the pain as sharp, shooting, throbbing pain. Rates pain as a 10/10. Pt has previous hx of an anterior lumbar intervertebral fusion in march. Pt reports a hx of weight loss sx on 4/10/18.

## 2022-06-02 ENCOUNTER — HOSPITAL ENCOUNTER (EMERGENCY)
Facility: HOSPITAL | Age: 37
Discharge: HOME | End: 2022-06-03
Attending: FAMILY MEDICINE
Payer: COMMERCIAL

## 2022-06-02 VITALS
OXYGEN SATURATION: 100 % | SYSTOLIC BLOOD PRESSURE: 127 MMHG | WEIGHT: 230.38 LBS | HEIGHT: 64 IN | HEART RATE: 80 BPM | RESPIRATION RATE: 15 BRPM | BODY MASS INDEX: 39.33 KG/M2 | TEMPERATURE: 98 F | DIASTOLIC BLOOD PRESSURE: 85 MMHG

## 2022-06-02 DIAGNOSIS — G89.29 CHRONIC BILATERAL LOW BACK PAIN WITH BILATERAL SCIATICA: Primary | ICD-10-CM

## 2022-06-02 DIAGNOSIS — M54.42 CHRONIC BILATERAL LOW BACK PAIN WITH BILATERAL SCIATICA: Primary | ICD-10-CM

## 2022-06-02 DIAGNOSIS — N30.00 ACUTE CYSTITIS WITHOUT HEMATURIA: ICD-10-CM

## 2022-06-02 DIAGNOSIS — M54.41 CHRONIC BILATERAL LOW BACK PAIN WITH BILATERAL SCIATICA: Primary | ICD-10-CM

## 2022-06-02 LAB
AMPHET UR QL SCN: NEGATIVE
APPEARANCE UR: ABNORMAL
B-HCG SERPL QL: NEGATIVE
BACTERIA #/AREA URNS AUTO: ABNORMAL /HPF
BARBITURATE SCN PRESENT UR: NEGATIVE
BENZODIAZ UR QL SCN: POSITIVE
BILIRUB UR QL STRIP.AUTO: NEGATIVE MG/DL
CANNABINOIDS UR QL SCN: POSITIVE
COCAINE UR QL SCN: NEGATIVE
COLOR UR AUTO: YELLOW
GLUCOSE UR QL STRIP.AUTO: NEGATIVE MG/DL
KETONES UR QL STRIP.AUTO: NEGATIVE MG/DL
LEUKOCYTE ESTERASE UR QL STRIP.AUTO: ABNORMAL UNIT/L
MDMA UR QL SCN: NEGATIVE
MUCOUS THREADS URNS QL MICRO: ABNORMAL /LPF
NITRITE UR QL STRIP.AUTO: NEGATIVE
OPIATES UR QL SCN: POSITIVE
PCP UR QL: NEGATIVE
PH UR STRIP.AUTO: 6 [PH]
PH UR: 6 [PH] (ref 3–11)
PROT UR QL STRIP.AUTO: NEGATIVE MG/DL
RBC #/AREA URNS AUTO: ABNORMAL /HPF
RBC UR QL AUTO: NEGATIVE UNIT/L
SP GR UR STRIP.AUTO: 1.02
SPECIFIC GRAVITY, URINE AUTO (.000) (OHS): 1.02 (ref 1–1.03)
SQUAMOUS #/AREA URNS AUTO: ABNORMAL /LPF
UROBILINOGEN UR STRIP-ACNC: 2 MG/DL
WBC #/AREA URNS AUTO: ABNORMAL /HPF

## 2022-06-02 PROCEDURE — 81025 URINE PREGNANCY TEST: CPT | Performed by: FAMILY MEDICINE

## 2022-06-02 PROCEDURE — 25000003 PHARM REV CODE 250: Performed by: FAMILY MEDICINE

## 2022-06-02 PROCEDURE — 99284 EMERGENCY DEPT VISIT MOD MDM: CPT | Mod: 25

## 2022-06-02 PROCEDURE — 80307 DRUG TEST PRSMV CHEM ANLYZR: CPT | Performed by: FAMILY MEDICINE

## 2022-06-02 PROCEDURE — 81001 URINALYSIS AUTO W/SCOPE: CPT | Mod: 59 | Performed by: FAMILY MEDICINE

## 2022-06-02 RX ORDER — OXYCODONE AND ACETAMINOPHEN 10; 325 MG/1; MG/1
1 TABLET ORAL
Status: COMPLETED | OUTPATIENT
Start: 2022-06-02 | End: 2022-06-02

## 2022-06-02 RX ADMIN — OXYCODONE AND ACETAMINOPHEN 1 TABLET: 10; 325 TABLET ORAL at 10:06

## 2022-06-03 PROCEDURE — 96372 THER/PROPH/DIAG INJ SC/IM: CPT | Performed by: FAMILY MEDICINE

## 2022-06-03 PROCEDURE — 63600175 PHARM REV CODE 636 W HCPCS: Performed by: FAMILY MEDICINE

## 2022-06-03 RX ORDER — DIAZEPAM 10 MG/2ML
5 INJECTION INTRAMUSCULAR
Status: COMPLETED | OUTPATIENT
Start: 2022-06-03 | End: 2022-06-03

## 2022-06-03 RX ORDER — NITROFURANTOIN 25; 75 MG/1; MG/1
100 CAPSULE ORAL 2 TIMES DAILY
Qty: 10 CAPSULE | Refills: 0 | Status: SHIPPED | OUTPATIENT
Start: 2022-06-03 | End: 2022-06-08

## 2022-06-03 RX ADMIN — DIAZEPAM 5 MG: 5 INJECTION, SOLUTION INTRAMUSCULAR; INTRAVENOUS at 01:06

## 2022-06-03 NOTE — ED PROVIDER NOTES
Encounter Date: 6/2/2022       History     Chief Complaint   Patient presents with    Fall    Back Pain      36-year-old female with a history of an L5-S1 fusion in March of this year by Dr. Urena presents with ongoing chronic low back pain with radiation down both legs.  Patient tells me she has fallen approximately 4 times since having the surgery.  Last fall was on Monday but she did not go to the emergency room after the fall. She is on Norco, Lyrica, and Skelaxin right now for the pain.  Patient had an MRI tonight at in [facility] and has follow-up with Dr. Urena on Tuesday for the results.  She denies  bowel and bladder incontinence.  Denies dysuria or hematuria.  No other complaints.  Multiple ED visits for similar complaints.        Review of patient's allergies indicates:   Allergen Reactions    Nsaids (non-steroidal anti-inflammatory drug)      Past Medical History:   Diagnosis Date    H/O gastric sleeve      Past Surgical History:   Procedure Laterality Date    BACK SURGERY      SPINAL FUSION       History reviewed. No pertinent family history.  Social History     Tobacco Use    Smoking status: Never Smoker    Smokeless tobacco: Never Used   Substance Use Topics    Alcohol use: Not Currently     Review of Systems   Constitutional: Negative.    HENT: Negative.    Eyes: Negative.    Respiratory: Negative.    Cardiovascular: Negative.    Gastrointestinal: Negative.    Endocrine: Negative.    Genitourinary: Negative.    Musculoskeletal: Positive for back pain.   Skin: Negative.    Allergic/Immunologic: Negative.    Neurological: Negative.    Hematological: Negative.    Psychiatric/Behavioral: Negative.        Physical Exam     Initial Vitals [06/02/22 2230]   BP Pulse Resp Temp SpO2   127/85 80 18 97.5 °F (36.4 °C) 100 %      MAP       --         Physical Exam    Nursing note and vitals reviewed.  Constitutional: She appears well-developed and well-nourished.   HENT:   Head: Normocephalic and  atraumatic.   Eyes: EOM are normal. Pupils are equal, round, and reactive to light.   Neck: Neck supple.   Normal range of motion.  Cardiovascular: Normal rate and regular rhythm.   Pulmonary/Chest: Breath sounds normal.   Abdominal: Abdomen is soft. Bowel sounds are normal.   Musculoskeletal:         General: Normal range of motion.      Cervical back: Normal range of motion and neck supple.     Neurological: She is alert and oriented to person, place, and time. She has normal strength. GCS score is 15. GCS eye subscore is 4. GCS verbal subscore is 5. GCS motor subscore is 6.   Skin: Skin is warm. Capillary refill takes less than 2 seconds.   Psychiatric: She has a normal mood and affect.         ED Course   Procedures  Labs Reviewed   URINALYSIS - Abnormal; Notable for the following components:       Result Value    Appearance, UA SL CLOUDY (*)     Urobilinogen, UA 2.0 (*)     Leukocyte Esterase, UA Small (*)     All other components within normal limits   DRUG SCREEN, URINE (BEAKER) - Abnormal; Notable for the following components:    Benzodiazepine, Urine Positive (*)     Cannabinoids, Urine Positive (*)     Opiates, Urine Positive (*)     All other components within normal limits    Narrative:     Cut off concentrations:    Amphetamines - 1000 ng/ml  Barbiturates - 200 ng/ml  Benzodiazepine - 200 ng/ml  Cannabinoids (THC) - 50 ng/ml  Cocaine - 300 ng/ml  Fentanyl - 1.0 ng/ml  MDMA - 500 ng/ml  Opiates - 300 ng/ml   Phencyclidine (PCP) - 25 ng/ml    Specimen submitted for drug analysis and tested for pH and specific gravity in order to evaluate sample integrity. Suspect tampering if specific gravity is <1.003 and/or pH is not within the range of 4.5 - 8.0  False negatives may result form substances such as bleach added to urine.  False positives may result for the presence of a substance with similar chemical structure to the drug or its metabolite.    This test provides only a PRELIMINARY analytical test  result. A more specific alternate chemical method must be used in order to obtain a confirmed analytical result. Gas chromatography/mass spectrometry (GC/MS) is the preferred confirmatory method. Other chemical confirmation methods are available. Clinical consideration and professional judgement should be applied to any drug of abuse test result, particularly when preliminary positive results are used.    Positive results will be confirmed only at the physicians request. Unconfirmed screening results are to be used only for medical purposes (treatment).        URINALYSIS, MICROSCOPIC - Abnormal; Notable for the following components:    Bacteria, UA Moderate (*)     Mucous, UA Small (*)     Squamous Epithelial Cells, UA Few (*)     All other components within normal limits   PREGNANCY TEST, URINE RAPID - Normal          Imaging Results    None          Medications   diazePAM injection 5 mg (has no administration in time range)   oxyCODONE-acetaminophen  mg per tablet 1 tablet (1 tablet Oral Given 6/2/22 2250)     Medical Decision Making:   ED Management:    Patient is nontoxic-appearing in no acute distress today vital signs stable.  Benign physical exam.  UA suggestive of UTI.  Will start Macrobid.  UDS positive for opioids, benzos, and marijuana.  Patient given IM Valium in the ED with good relief in pain.  Encouraged her to take her medications as prescribed.  Keep follow-up with Dr. Rhodes on Tuesday.  Strict return to ER precautions given, patient voiced understanding.                      Clinical Impression:   Final diagnoses:  [M54.42, M54.41, G89.29] Chronic bilateral low back pain with bilateral sciatica (Primary)  [N30.00] Acute cystitis without hematuria          ED Disposition Condition    Discharge Stable        ED Prescriptions     Medication Sig Dispense Start Date End Date Auth. Provider    nitrofurantoin, macrocrystal-monohydrate, (MACROBID) 100 MG capsule Take 1 capsule (100 mg total) by  mouth 2 (two) times daily. for 5 days 10 capsule 6/3/2022 6/8/2022 Garry Valentin MD        Follow-up Information     Follow up With Specialties Details Why Contact Info    Riana Urena MD Neurosurgery On 6/7/2022  [address] [place] [address]  [phone]             Garry Valentin MD  06/03/22 0058

## 2022-06-03 NOTE — ED TRIAGE NOTES
Pt states she fell on Monday, but doesn't remember falling. Pt c/o back pain and bliateral leg numbness and pain. Pt s/p fusion to S5-L1 3/06/21.

## 2022-06-12 ENCOUNTER — HOSPITAL ENCOUNTER (EMERGENCY)
Facility: HOSPITAL | Age: 37
Discharge: HOME | End: 2022-06-12
Attending: EMERGENCY MEDICINE
Payer: COMMERCIAL

## 2022-06-12 VITALS
DIASTOLIC BLOOD PRESSURE: 88 MMHG | HEART RATE: 68 BPM | BODY MASS INDEX: 39.27 KG/M2 | OXYGEN SATURATION: 100 % | SYSTOLIC BLOOD PRESSURE: 126 MMHG | TEMPERATURE: 98 F | RESPIRATION RATE: 18 BRPM | HEIGHT: 64 IN | WEIGHT: 230 LBS

## 2022-06-12 DIAGNOSIS — G89.29 ACUTE EXACERBATION OF CHRONIC LOW BACK PAIN: Primary | ICD-10-CM

## 2022-06-12 DIAGNOSIS — M54.50 ACUTE EXACERBATION OF CHRONIC LOW BACK PAIN: Primary | ICD-10-CM
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
## 2022-06-12 LAB — B-HCG SERPL QL: NEGATIVE

## 2022-06-12 PROCEDURE — 99284 EMERGENCY DEPT VISIT MOD MDM: CPT | Mod: 25

## 2022-06-12 PROCEDURE — 63600175 PHARM REV CODE 636 W HCPCS: Performed by: EMERGENCY MEDICINE

## 2022-06-12 PROCEDURE — 81025 URINE PREGNANCY TEST: CPT | Performed by: EMERGENCY MEDICINE

## 2022-06-12 PROCEDURE — 96372 THER/PROPH/DIAG INJ SC/IM: CPT | Performed by: EMERGENCY MEDICINE

## 2022-06-12 RX ORDER — OXYCODONE AND ACETAMINOPHEN 10; 325 MG/1; MG/1
1 TABLET ORAL EVERY 8 HOURS PRN
Qty: 6 TABLET | Refills: 0 | OUTPATIENT
Start: 2022-06-12 | End: 2022-06-26

## 2022-06-12 RX ORDER — FENTANYL CITRATE 50 UG/ML
100 INJECTION, SOLUTION INTRAMUSCULAR; INTRAVENOUS
Status: COMPLETED | OUTPATIENT
Start: 2022-06-12 | End: 2022-06-12

## 2022-06-12 RX ADMIN — FENTANYL CITRATE 100 MCG: 50 INJECTION INTRAMUSCULAR; INTRAVENOUS at 11:06

## 2022-06-12 NOTE — ED PROVIDER NOTES
Encounter Date: 6/12/2022       History     Chief Complaint   Patient presents with    Fall     Pt c/o low backpain and right leg pain s/p fall in shower this morning. States hx of back surgery 3/7/22.     The history is provided by the patient. No  was used.   Fall  The accident occurred just prior to arrival. The fall occurred while standing. She fell from a height of 3 to 5 ft. She landed on a hard floor. The point of impact was the buttocks. The pain is present in the back. She was ambulatory at the scene. There was no entrapment after the fall. There was no drug use involved in the accident. There was no alcohol use involved in the accident. Associated symptoms include back pain and paresthesias. Pertinent negatives include no fever and no nausea.   Pt had lumbar fusion in March and has been having chronic pain despite her surgery and worsening paresthesias to BLE.  Has followed-up with Dr. Urena and has been referred to pain mgmt - has appt 6/14  Currently taking Norco for pain, but states she has been on it for years and it is not effective in controlling her pain anymore.    Review of patient's allergies indicates:   Allergen Reactions    Nsaids (non-steroidal anti-inflammatory drug)      Past Medical History:   Diagnosis Date    Chronic back pain     H/O gastric sleeve      Past Surgical History:   Procedure Laterality Date    abominal plasty      BACK SURGERY      gastric sleeve      SPINAL FUSION       No family history on file.  Social History     Tobacco Use    Smoking status: Never Smoker    Smokeless tobacco: Never Used   Substance Use Topics    Alcohol use: Not Currently     Review of Systems   Constitutional: Negative for fever.   HENT: Negative for sore throat.    Respiratory: Negative for shortness of breath.    Cardiovascular: Negative for chest pain.   Gastrointestinal: Negative for nausea.   Genitourinary: Negative for dysuria.   Musculoskeletal: Positive for  back pain.   Skin: Negative for rash.   Neurological: Positive for paresthesias. Negative for weakness.   Hematological: Does not bruise/bleed easily.   All other systems reviewed and are negative.      Physical Exam     Initial Vitals [06/12/22 1059]   BP Pulse Resp Temp SpO2   135/85 98 20 97.9 °F (36.6 °C) 100 %      MAP       --         Physical Exam    Nursing note and vitals reviewed.  Constitutional: She appears well-developed and well-nourished.   HENT:   Head: Normocephalic and atraumatic.   Right Ear: External ear normal.   Left Ear: External ear normal.   Eyes: Conjunctivae and EOM are normal. Pupils are equal, round, and reactive to light.   Neck: Neck supple.   Normal range of motion.  Cardiovascular: Normal rate, regular rhythm, normal heart sounds and intact distal pulses.   Pulmonary/Chest: Breath sounds normal.   Abdominal: Abdomen is soft. Bowel sounds are normal.   Musculoskeletal:         General: Normal range of motion.      Cervical back: Normal range of motion and neck supple.     Neurological: She is alert and oriented to person, place, and time. GCS score is 15. GCS eye subscore is 4. GCS verbal subscore is 5. GCS motor subscore is 6.   Skin: Skin is warm and dry. Capillary refill takes less than 2 seconds.   Psychiatric: She has a normal mood and affect. Her behavior is normal. Judgment and thought content normal.         ED Course   Procedures  Labs Reviewed   PREGNANCY TEST, URINE RAPID - Normal          Imaging Results          X-Ray Lumbar Spine Ap And Lateral (Preliminary result)  Result time 06/12/22 11:55:43    ED Interpretation by Garrick Eldridge MD (06/12/22 11:55:43, [facility] - Emergency Dept, Emergency Medicine)    Post-op changes, NAF                               Medications   fentaNYL injection 100 mcg (100 mcg Intramuscular Given 6/12/22 1134)                          Clinical Impression:   Final diagnoses:  [M54.50, G89.29] Acute exacerbation of  chronic low back pain (Primary)  [W19.XXXA] Fall, initial encounter          ED Disposition Condition    Discharge Stable        ED Prescriptions     Medication Sig Dispense Start Date End Date Auth. Provider    oxyCODONE-acetaminophen (PERCOCET)  mg per tablet Take 1 tablet by mouth every 8 (eight) hours as needed for Pain. 6 tablet 6/12/2022  Garrick Eldridge MD        Follow-up Information     Follow up With Specialties Details Why Contact Info    Keep your pain management appointment on 6/14.               Garrick Eldridge MD  06/12/22 1153

## 2022-06-12 NOTE — ED TRIAGE NOTES
Pt c/o low backpain and right leg pain s/p fall in shower this morning. States hx of back surgery 3/7/22.

## 2022-06-13 ENCOUNTER — HOSPITAL ENCOUNTER (EMERGENCY)
Facility: HOSPITAL | Age: 37
Discharge: HOME | End: 2022-06-13
Attending: EMERGENCY MEDICINE
Payer: COMMERCIAL

## 2022-06-13 VITALS
OXYGEN SATURATION: 100 % | BODY MASS INDEX: 39.27 KG/M2 | TEMPERATURE: 98 F | SYSTOLIC BLOOD PRESSURE: 119 MMHG | HEART RATE: 68 BPM | RESPIRATION RATE: 16 BRPM | WEIGHT: 230 LBS | DIASTOLIC BLOOD PRESSURE: 76 MMHG | HEIGHT: 64 IN

## 2022-06-13 DIAGNOSIS — G89.29 ACUTE EXACERBATION OF CHRONIC LOW BACK PAIN: Primary | ICD-10-CM

## 2022-06-13 DIAGNOSIS — M54.50 ACUTE EXACERBATION OF CHRONIC LOW BACK PAIN: Primary | ICD-10-CM

## 2022-06-13 PROCEDURE — 96372 THER/PROPH/DIAG INJ SC/IM: CPT | Performed by: EMERGENCY MEDICINE

## 2022-06-13 PROCEDURE — 63600175 PHARM REV CODE 636 W HCPCS: Performed by: EMERGENCY MEDICINE

## 2022-06-13 PROCEDURE — 25000003 PHARM REV CODE 250: Performed by: EMERGENCY MEDICINE

## 2022-06-13 PROCEDURE — 99285 EMERGENCY DEPT VISIT HI MDM: CPT | Mod: 25

## 2022-06-13 RX ORDER — KETOROLAC TROMETHAMINE 30 MG/ML
60 INJECTION, SOLUTION INTRAMUSCULAR; INTRAVENOUS
Status: COMPLETED | OUTPATIENT
Start: 2022-06-13 | End: 2022-06-13

## 2022-06-13 RX ORDER — METHYLPREDNISOLONE SOD SUCC 125 MG
125 VIAL (EA) INJECTION
Status: COMPLETED | OUTPATIENT
Start: 2022-06-13 | End: 2022-06-13

## 2022-06-13 RX ORDER — OXYCODONE AND ACETAMINOPHEN 10; 325 MG/1; MG/1
1 TABLET ORAL ONCE
Status: DISCONTINUED | OUTPATIENT
Start: 2022-06-13 | End: 2022-06-13

## 2022-06-13 RX ORDER — METHYLPREDNISOLONE SOD SUCC 125 MG
125 VIAL (EA) INJECTION
Status: DISCONTINUED | OUTPATIENT
Start: 2022-06-13 | End: 2022-06-13

## 2022-06-13 RX ORDER — OXYCODONE AND ACETAMINOPHEN 10; 325 MG/1; MG/1
1 TABLET ORAL ONCE
Status: COMPLETED | OUTPATIENT
Start: 2022-06-13 | End: 2022-06-13

## 2022-06-13 RX ADMIN — OXYCODONE AND ACETAMINOPHEN 1 TABLET: 10; 325 TABLET ORAL at 10:06

## 2022-06-13 RX ADMIN — METHYLPREDNISOLONE SODIUM SUCCINATE 125 MG: 125 INJECTION, POWDER, FOR SOLUTION INTRAMUSCULAR; INTRAVENOUS at 11:06

## 2022-06-13 RX ADMIN — KETOROLAC TROMETHAMINE 60 MG: 60 INJECTION, SOLUTION INTRAMUSCULAR at 11:06

## 2022-06-14 NOTE — ED PROVIDER NOTES
Encounter Date: 6/13/2022       History     Chief Complaint   Patient presents with    Back Pain     36 year old WF with a history of chronic back pain, prior L-spine surgery, on pain management states she fell 2 days ago and has severe low back pain.  She was seen yesterday and had a negative Xray and has an appt with her pain management doctor.  Pain is severe stabbing burning pain right in the midline of her L-spine L3-5 region.  Not reieived by Percocet as rx and she was previously given Fentanyl which have her a bad headache and low blood pressure so she does not want that.        Review of patient's allergies indicates:   Allergen Reactions    Nsaids (non-steroidal anti-inflammatory drug)      Past Medical History:   Diagnosis Date    Chronic back pain     H/O gastric sleeve      Past Surgical History:   Procedure Laterality Date    abominal plasty      BACK SURGERY      gastric sleeve      SPINAL FUSION       No family history on file.  Social History     Tobacco Use    Smoking status: Never Smoker    Smokeless tobacco: Never Used   Substance Use Topics    Alcohol use: Not Currently     Review of Systems   Constitutional: Negative for fever.   HENT: Negative for sore throat.    Respiratory: Negative for shortness of breath.    Cardiovascular: Negative for chest pain.   Gastrointestinal: Negative for nausea.   Genitourinary: Negative for dysuria.   Musculoskeletal: Positive for back pain.   Skin: Negative for rash.   Neurological: Negative for weakness.   Hematological: Does not bruise/bleed easily.       Physical Exam     Initial Vitals [06/13/22 2133]   BP Pulse Resp Temp SpO2   119/76 68 18 98.4 °F (36.9 °C) 100 %      MAP       --         Physical Exam    Nursing note and vitals reviewed.  Constitutional: She appears well-developed and well-nourished. She is not diaphoretic. No distress.   HENT:   Head: Normocephalic and atraumatic.   Mouth/Throat: Oropharynx is clear and moist.   Eyes:  Conjunctivae are normal. Pupils are equal, round, and reactive to light.   Neck: Neck supple.   Cardiovascular: Normal rate, regular rhythm, normal heart sounds and intact distal pulses.   Pulmonary/Chest: Breath sounds normal. No respiratory distress. She has no wheezes. She has no rhonchi. She has no rales.   Abdominal: Abdomen is soft. Bowel sounds are normal. She exhibits no distension. There is no abdominal tenderness. There is no guarding.   Musculoskeletal:         General: No tenderness or edema. Normal range of motion.      Cervical back: Neck supple.      Comments: Tender midline L3-5 with minimal lumbar muscle tenderness and no palpable abnormality     Neurological: She is alert and oriented to person, place, and time.   Ambulatory without assistance with a steady gait, no weakness or numbness of the legs   Skin: Skin is warm and dry. Capillary refill takes less than 2 seconds. No rash noted.   Psychiatric: She has a normal mood and affect. Thought content normal.         ED Course   Procedures  Labs Reviewed - No data to display       Imaging Results          CT Lumbar Spine Without Contrast (Final result)  Result time 06/14/22 08:29:34    Final result by Michelle Parks MD (06/14/22 08:29:34)                 Impression:      1. No acute abnormality  2. Postsurgical changes at L5-S1 without evidence of complication  The preliminary and final reports are concordant.      Electronically signed by: Michelle Parks  Date:    06/14/2022  Time:    08:29             Narrative:    EXAMINATION:  CT LUMBAR SPINE WITHOUT CONTRAST    CLINICAL HISTORY:  Low back pain, trauma;Negative Xray yesterday;    TECHNIQUE:  Low dose helical acquired images with axial, sagittal and coronal reformations though the lumbar spine.  Contrast was not administered.    All CT scans at this location are performed using dose optimization techniques as appropriate to a performed exam including the following automated exposure  control, adjustment of the mA and/or kV according to patient size and/or use of iterative reconstruction technique    DLP: 1201 mGycm    COMPARISON:  Plain radiographs of the lumbar spine 06/12/2022, CT lumbar spine 05/11/2022    FINDINGS:  NUMBERING: Last fully formed disc space is designated L5-S1.    BONES: No fracture. Normal alignment. There are postsurgical changes of L5-S1 discectomy with disc prosthesis.  Prosthesis appears well position.  No subsidence or migration.  Unchanged facet arthropathy in the lower lumbar spine.  Unchanged degenerative spurring and sclerosis at the inferior sacroiliac joints.    DISCS: Disc spaces are relatively well preserved.  No appreciable disc protrusion.  Minor disc bulging noted.    SPINAL CANAL: No osseous narrowing of the spinal canal.    SOFT TISSUES: Regional soft tissues are unremarkable.                    Preliminary result by Michelle Parks MD (06/13/22 21:59:19)                 Narrative:    START OF REPORT:  Technique: CT of the lumbar spine was performed without intravenous contrast with direct axial as well as sagittal and coronal reconstruction images.    Comparison: None.    Clinical history: Low back pain, trauma, surgery on lower back in march 2022.    Findings:  Anatomy: Unremarkable.  Mineralization: The bony mineralization is within normal limits.  Congenital: None.  Bone alignment: Unremarkable with no significant listhesis.  Curvature: Lumbar lordosis is maintained.  Bone and bone marrow: Vertebral body heights are maintained.  Intervertebral disc spaces: Preserved throughout.  Osteophytes: Multilevel small anterior marginal osteophytes are seen.  Endplate Sclerosis: Mild endplate sclerosis is seen off the opposing endplates at L5-S1.  Facet degenerative changes: Mild multilevel facet degenerative changes are seen.  Spinal canal: Unremarkable with no bony spinal canal stenosis identified.  Fractures: No acute fracture dislocation or subluxation is  seen.  Orthopedic Hardware: An interbody fusion device is in place at L5-S1.      Impression:  1. No acute fracture dislocation or subluxation is seen.  2. Details and other findings as noted above.                                   Medications   oxyCODONE-acetaminophen  mg per tablet 1 tablet (1 tablet Oral Given 6/13/22 2202)   ketorolac injection 60 mg (60 mg Intramuscular Given 6/13/22 2315)   methylPREDNISolone sodium succinate injection 125 mg (125 mg Intramuscular Given 6/13/22 2311)                 ED Course as of 06/14/22 7091   Mon Jun 13, 2022 2305 CT of the L-spine shows no acute injury related to her recent fall.  She has an appointment with her pain management doctor in the morning.  I gave her oxycodone by mouth but she states that she has not had any pain relief.  She is requesting a shot of an NSAID and a steroid.  She does have NSAIDs listed as an allergy but she states that she can take Toradol as a shot and she has had that in the past.  She is specifically requesting a shot of an NSAID and steroid. [SH]      ED Course User Index  [SH] Andie Montero MD             Clinical Impression:   Final diagnoses:  [M54.50, G89.29] Acute exacerbation of chronic low back pain (Primary)          ED Disposition Condition    Discharge Stable        ED Prescriptions     None        Follow-up Information     Follow up With Specialties Details Why Contact Info    Pain management   Keep your appointment with your doctor in the morning            Andie Montero MD  06/14/22 1022

## 2022-06-26 ENCOUNTER — HOSPITAL ENCOUNTER (EMERGENCY)
Facility: HOSPITAL | Age: 37
Discharge: HOME | End: 2022-06-26
Attending: STUDENT IN AN ORGANIZED HEALTH CARE EDUCATION/TRAINING PROGRAM
Payer: COMMERCIAL

## 2022-06-26 VITALS
RESPIRATION RATE: 18 BRPM | BODY MASS INDEX: 40.12 KG/M2 | DIASTOLIC BLOOD PRESSURE: 88 MMHG | HEART RATE: 75 BPM | SYSTOLIC BLOOD PRESSURE: 140 MMHG | OXYGEN SATURATION: 100 % | HEIGHT: 64 IN | WEIGHT: 235 LBS | TEMPERATURE: 98 F

## 2022-06-26 DIAGNOSIS — M54.50 ACUTE EXACERBATION OF CHRONIC LOW BACK PAIN: Primary | ICD-10-CM

## 2022-06-26 DIAGNOSIS — G89.29 ACUTE EXACERBATION OF CHRONIC LOW BACK PAIN: Primary | ICD-10-CM

## 2022-06-26 LAB
APPEARANCE UR: CLEAR
B-HCG SERPL QL: NEGATIVE
BILIRUB UR QL STRIP.AUTO: NEGATIVE MG/DL
COLOR UR AUTO: YELLOW
GLUCOSE UR QL STRIP.AUTO: NEGATIVE MG/DL
KETONES UR QL STRIP.AUTO: ABNORMAL MG/DL
LEUKOCYTE ESTERASE UR QL STRIP.AUTO: NEGATIVE UNIT/L
NITRITE UR QL STRIP.AUTO: NEGATIVE
PH UR STRIP.AUTO: 7 [PH]
PROT UR QL STRIP.AUTO: NEGATIVE MG/DL
RBC UR QL AUTO: NEGATIVE UNIT/L
SP GR UR STRIP.AUTO: 1.01
UROBILINOGEN UR STRIP-ACNC: 1 MG/DL

## 2022-06-26 PROCEDURE — 81025 URINE PREGNANCY TEST: CPT | Performed by: STUDENT IN AN ORGANIZED HEALTH CARE EDUCATION/TRAINING PROGRAM

## 2022-06-26 PROCEDURE — 99283 EMERGENCY DEPT VISIT LOW MDM: CPT

## 2022-06-26 PROCEDURE — 81003 URINALYSIS AUTO W/O SCOPE: CPT | Performed by: STUDENT IN AN ORGANIZED HEALTH CARE EDUCATION/TRAINING PROGRAM

## 2022-06-26 PROCEDURE — 25000003 PHARM REV CODE 250: Performed by: STUDENT IN AN ORGANIZED HEALTH CARE EDUCATION/TRAINING PROGRAM

## 2022-06-26 RX ORDER — TIZANIDINE 4 MG/1
4 TABLET ORAL 3 TIMES DAILY PRN
COMMUNITY
Start: 2022-06-14 | End: 2022-07-07 | Stop reason: SDUPTHER

## 2022-06-26 RX ORDER — OXYCODONE AND ACETAMINOPHEN 10; 325 MG/1; MG/1
1 TABLET ORAL EVERY 6 HOURS PRN
Qty: 12 TABLET | Refills: 0 | OUTPATIENT
Start: 2022-06-26 | End: 2022-06-29

## 2022-06-26 RX ORDER — PREGABALIN 150 MG/1
150 CAPSULE ORAL 2 TIMES DAILY
COMMUNITY
Start: 2022-06-20 | End: 2022-07-06 | Stop reason: SDUPTHER

## 2022-06-26 RX ORDER — NALOXONE HYDROCHLORIDE 4 MG/.1ML
SPRAY NASAL
COMMUNITY
Start: 2022-03-10 | End: 2022-07-19

## 2022-06-26 RX ORDER — ARIPIPRAZOLE 10 MG/1
5 TABLET ORAL 2 TIMES DAILY
COMMUNITY
Start: 2022-06-16 | End: 2023-07-13 | Stop reason: ALTCHOICE

## 2022-06-26 RX ORDER — OXYCODONE AND ACETAMINOPHEN 5; 325 MG/1; MG/1
1 TABLET ORAL
Status: COMPLETED | OUTPATIENT
Start: 2022-06-26 | End: 2022-06-26

## 2022-06-26 RX ADMIN — OXYCODONE HYDROCHLORIDE AND ACETAMINOPHEN 1 TABLET: 5; 325 TABLET ORAL at 08:06

## 2022-06-27 NOTE — ED PROVIDER NOTES
Encounter Date: 6/26/2022       History     Chief Complaint   Patient presents with    Leg Pain     Pt c/o of severe back and leg pain, had sx with dr. Greer in march 7. Fusion at L5 and S1, pt in back brace, out of meds, referred to pain management, no apt at this time     The history is provided by the patient and medical records.   Back Pain   This is a chronic problem. The current episode started several weeks ago. The problem occurs most days. The problem has been unchanged. The pain is associated with no known injury. The pain is present in the lumbar spine. The quality of the pain is described as stabbing and aching. The pain radiates to the left leg and right leg. The pain is at a severity of 10/10. The symptoms are aggravated by bending, twisting and certain positions. The pain is the same all the time. Associated symptoms include tingling. Pertinent negatives include no chest pain, no fever, no abdominal pain, no bladder incontinence, no pelvic pain, no paresthesias, no paresis and no weakness. Risk factors include obesity and a sedentary lifestyle.   36-year-old female with a past medical history of recent spinal fusion to the lumbar spine.  She states that she called her surgeon because he ran out of pain medicine.  He referred her to pain management states that he was not able to refill her pain medication and told to come to emergency department.  Patient states her pain is to the point where it is interfering with her life.  Denies any fall since last ER visit.  No new weakness or new symptoms.  States that it has just worsened pain.  States she will call pain management tomorrow.  Review of patient's allergies indicates:   Allergen Reactions    Nsaids (non-steroidal anti-inflammatory drug)      Hx of bariatric sx     Past Medical History:   Diagnosis Date    Chronic back pain     H/O gastric sleeve      Past Surgical History:   Procedure Laterality Date    abominal plasty      BACK SURGERY       BARIATRIC SURGERY      gastric sleeve      SPINAL FUSION       History reviewed. No pertinent family history.  Social History     Tobacco Use    Smoking status: Never Smoker    Smokeless tobacco: Never Used   Substance Use Topics    Alcohol use: Not Currently     Review of Systems   Constitutional: Negative for fever.   Respiratory: Negative for shortness of breath.    Cardiovascular: Negative for chest pain.   Gastrointestinal: Negative for abdominal pain.   Genitourinary: Negative for bladder incontinence and pelvic pain.   Musculoskeletal: Positive for back pain.   Neurological: Positive for tingling. Negative for weakness and paresthesias.   All other systems reviewed and are negative.      Physical Exam     Initial Vitals [06/26/22 1840]   BP Pulse Resp Temp SpO2   (!) 100/59 99 18 98.1 °F (36.7 °C) 97 %      MAP       --         Physical Exam    Nursing note and vitals reviewed.  Constitutional: She appears well-developed.   Cardiovascular: Normal rate, regular rhythm and intact distal pulses.   Pulmonary/Chest: Breath sounds normal. No respiratory distress.   Abdominal: Abdomen is soft. Bowel sounds are normal. There is no abdominal tenderness.   Musculoskeletal:         General: Normal range of motion.      Comments: Tenderness to palpation to the lower back.  TLSO brace in place.  Strength equal in bilateral legs with sensation intact.     Neurological: She is alert and oriented to person, place, and time. She has normal strength. GCS score is 15. GCS eye subscore is 4. GCS verbal subscore is 5. GCS motor subscore is 6.   Skin: Skin is warm. Capillary refill takes less than 2 seconds.   Psychiatric: She has a normal mood and affect.         ED Course   Procedures  Labs Reviewed   URINALYSIS, REFLEX TO URINE CULTURE - Abnormal; Notable for the following components:       Result Value    Ketones, UA Trace (*)     All other components within normal limits   PREGNANCY TEST, URINE RAPID - Normal           Imaging Results    None          Medications   oxyCODONE-acetaminophen 5-325 mg per tablet 1 tablet (1 tablet Oral Given 6/26/22 2031)     Medical Decision Making:   ED Management:  No red flags.  No need for imaging                      Clinical Impression:   Final diagnoses:  [M54.50, G89.29] Acute exacerbation of chronic low back pain (Primary)          ED Disposition Condition    Discharge Stable        ED Prescriptions     Medication Sig Dispense Start Date End Date Auth. Provider    oxyCODONE-acetaminophen (PERCOCET)  mg per tablet Take 1 tablet by mouth every 6 (six) hours as needed for Pain. 12 tablet 6/26/2022 6/29/2022 Joshua Cox MD        Follow-up Information     Follow up With Specialties Details Why Contact Info    Nic Guzman MD Family Medicine Schedule an appointment as soon as possible for a visit   [address] [address]  [phone]      [facility] - Emergency Dept Emergency Medicine Go to  If symptoms worsen [id number]  [facility] [id number] [phone]    Follow up with pain management               Joshua Cox MD  06/26/22 2052

## 2022-06-28 ENCOUNTER — PATIENT MESSAGE (OUTPATIENT)
Dept: FAMILY MEDICINE | Facility: CLINIC | Age: 37
End: 2022-06-28

## 2022-06-28 ENCOUNTER — LAB VISIT (OUTPATIENT)
Dept: LAB | Facility: HOSPITAL | Age: 37
End: 2022-06-28
Attending: SURGERY
Payer: COMMERCIAL

## 2022-06-28 ENCOUNTER — OFFICE VISIT (OUTPATIENT)
Dept: FAMILY MEDICINE | Facility: CLINIC | Age: 37
End: 2022-06-28
Payer: COMMERCIAL

## 2022-06-28 DIAGNOSIS — T45.2X6A UNDERDOSING OF VITAMIN: ICD-10-CM

## 2022-06-28 DIAGNOSIS — M54.50 CHRONIC BILATERAL LOW BACK PAIN, UNSPECIFIED WHETHER SCIATICA PRESENT: Primary | ICD-10-CM

## 2022-06-28 DIAGNOSIS — G89.29 CHRONIC BILATERAL LOW BACK PAIN, UNSPECIFIED WHETHER SCIATICA PRESENT: Primary | ICD-10-CM

## 2022-06-28 DIAGNOSIS — Z91.81 PERSONAL HISTORY OF FALL: ICD-10-CM

## 2022-06-28 DIAGNOSIS — E51.11 BERIBERI: ICD-10-CM

## 2022-06-28 DIAGNOSIS — D51.1 MEGALOBLASTIC ANEMIA DUE TO VITAMIN B12 MALABSORPTION WITH PROTEINURIA: ICD-10-CM

## 2022-06-28 DIAGNOSIS — E61.1 IRON DEFICIENCY: Primary | ICD-10-CM

## 2022-06-28 LAB
ALBUMIN SERPL-MCNC: 3.5 GM/DL (ref 3.5–5)
ALBUMIN/GLOB SERPL: 1.3 RATIO (ref 1.1–2)
ALP SERPL-CCNC: 133 UNIT/L (ref 40–150)
ALT SERPL-CCNC: 15 UNIT/L (ref 0–55)
AST SERPL-CCNC: 13 UNIT/L (ref 5–34)
BASOPHILS # BLD AUTO: 0.03 X10(3)/MCL (ref 0–0.2)
BASOPHILS NFR BLD AUTO: 0.6 %
BILIRUBIN DIRECT+TOT PNL SERPL-MCNC: 0.2 MG/DL
BUN SERPL-MCNC: 6.6 MG/DL (ref 7–18.7)
CALCIUM SERPL-MCNC: 8.6 MG/DL (ref 8.4–10.2)
CHLORIDE SERPL-SCNC: 106 MMOL/L (ref 98–107)
CO2 SERPL-SCNC: 26 MMOL/L (ref 22–29)
CREAT SERPL-MCNC: 0.63 MG/DL (ref 0.55–1.02)
EOSINOPHIL # BLD AUTO: 0.05 X10(3)/MCL (ref 0–0.9)
EOSINOPHIL NFR BLD AUTO: 1 %
ERYTHROCYTE [DISTWIDTH] IN BLOOD BY AUTOMATED COUNT: 14.3 % (ref 11.5–17)
GLOBULIN SER-MCNC: 2.7 GM/DL (ref 2.4–3.5)
GLUCOSE SERPL-MCNC: 65 MG/DL (ref 74–100)
HCT VFR BLD AUTO: 33.9 % (ref 37–47)
HGB BLD-MCNC: 10 GM/DL (ref 12–16)
IMM GRANULOCYTES # BLD AUTO: 0.02 X10(3)/MCL (ref 0–0.02)
IMM GRANULOCYTES NFR BLD AUTO: 0.4 % (ref 0–0.43)
IRON SATN MFR SERPL: 7 % (ref 20–50)
IRON SERPL-MCNC: 24 UG/DL (ref 50–170)
LYMPHOCYTES # BLD AUTO: 2.19 X10(3)/MCL (ref 0.6–4.6)
LYMPHOCYTES NFR BLD AUTO: 45.3 %
MCH RBC QN AUTO: 28.7 PG (ref 27–31)
MCHC RBC AUTO-ENTMCNC: 29.5 MG/DL (ref 33–36)
MCV RBC AUTO: 97.1 FL (ref 80–94)
MONOCYTES # BLD AUTO: 0.35 X10(3)/MCL (ref 0.1–1.3)
MONOCYTES NFR BLD AUTO: 7.2 %
NEUTROPHILS # BLD AUTO: 2.2 X10(3)/MCL (ref 2.1–9.2)
NEUTROPHILS NFR BLD AUTO: 45.5 %
NRBC BLD AUTO-RTO: 0 %
PLATELET # BLD AUTO: 213 X10(3)/MCL (ref 130–400)
PMV BLD AUTO: 9.9 FL (ref 9.4–12.4)
POTASSIUM SERPL-SCNC: 3.8 MMOL/L (ref 3.5–5.1)
PROT SERPL-MCNC: 6.2 GM/DL (ref 6.4–8.3)
RBC # BLD AUTO: 3.49 X10(6)/MCL (ref 4.2–5.4)
SODIUM SERPL-SCNC: 140 MMOL/L (ref 136–145)
TIBC SERPL-MCNC: 311 UG/DL (ref 70–310)
TIBC SERPL-MCNC: 335 UG/DL (ref 250–450)
VIT B12 SERPL-MCNC: 532 PG/ML (ref 213–816)
WBC # SPEC AUTO: 4.8 X10(3)/MCL (ref 4.5–11.5)

## 2022-06-28 PROCEDURE — 36415 COLL VENOUS BLD VENIPUNCTURE: CPT

## 2022-06-28 PROCEDURE — 80053 COMPREHEN METABOLIC PANEL: CPT

## 2022-06-28 PROCEDURE — 83540 ASSAY OF IRON: CPT

## 2022-06-28 PROCEDURE — 99213 PR OFFICE/OUTPT VISIT, EST, LEVL III, 20-29 MIN: ICD-10-PCS | Mod: 95,,, | Performed by: NURSE PRACTITIONER

## 2022-06-28 PROCEDURE — 82607 VITAMIN B-12: CPT

## 2022-06-28 PROCEDURE — 99213 OFFICE O/P EST LOW 20 MIN: CPT | Mod: 95,,, | Performed by: NURSE PRACTITIONER

## 2022-06-28 PROCEDURE — 85025 COMPLETE CBC W/AUTO DIFF WBC: CPT

## 2022-06-28 PROCEDURE — 84425 ASSAY OF VITAMIN B-1: CPT | Performed by: SURGERY

## 2022-06-28 NOTE — PROGRESS NOTES
The patient location is:  Patient Home  The chief complaint leading to consultation is: as below  Visit type: Virtual visit with synchronous audio and video  Total time spent with patient: 22  Each patient to whom he or she provides medical services by telemedicine is:  (1) informed of the relationship between the physician and patient and the respective role of any other health care provider with respect to management of the patient; and (2) notified that she may decline to receive medical services by telemedicine and may withdraw from such care at any time.      HPI     Chief Complaint:  Back pain      Carol Edmond is a 36 y.o. female with multiple medical diagnoses as listed in the medical history and problem list that presents for chronic back pain.  Pt is new to me but is known to this clinic with her last appointment being Visit date not found.      Back Pain  This is a chronic problem. The current episode started more than 1 year ago. The problem occurs constantly. The problem has been gradually worsening since onset. The pain is present in the sacro-iliac. The quality of the pain is described as aching, shooting and stabbing. The pain radiates to the left foot, left knee, left thigh, right foot, right knee and right thigh. The pain is at a severity of 10/10. The pain is severe. The pain is the same all the time. The symptoms are aggravated by bending, position, lying down, sitting, standing and twisting. Stiffness is present in the morning. Associated symptoms include headaches, leg pain, numbness, paresthesias, tingling and weakness. Pertinent negatives include no abdominal pain, bladder incontinence, bowel incontinence, chest pain, dysuria, fever, paresis, pelvic pain, perianal numbness or weight loss. The treatment provided no relief.     Chart review completed.  Patient has multiple emergency room visits for similar symptoms.  Specifically 5/9, 5/10, 5/13, 6/2, 6/3, 6/6, 6/12, 6/13 and most recently  June 26, 2022.     See relevant notes from June 26 encounter below:  Back Pain   This is a chronic problem. The current episode started several weeks ago. The problem occurs most days. The problem has been unchanged. The pain is associated with no known injury. The pain is present in the lumbar spine. The quality of the pain is described as stabbing and aching. The pain radiates to the left leg and right leg. The pain is at a severity of 10/10. The symptoms are aggravated by bending, twisting and certain positions. The pain is the same all the time. Associated symptoms include tingling. Pertinent negatives include no chest pain, no fever, no abdominal pain, no bladder incontinence, no pelvic pain, no paresthesias, no paresis and no weakness. Risk factors include obesity and a sedentary lifestyle.   36-year-old female with a past medical history of recent spinal fusion to the lumbar spine.  She states that she called her surgeon because he ran out of pain medicine.  He referred her to pain management states that he was not able to refill her pain medication and told to come to emergency department.  Patient states her pain is to the point where it is interfering with her life.  Denies any fall since last ER visit.  No new weakness or new symptoms.  States that it has just worsened pain.  States she will call pain management tomorrow.    Patient was prescribed Percocet 5-325 mg, 12 tablets.  Patient reports Percocet is very effective in combination with Lyrica in relieving her pain however she is nearly out of medication and is requesting refill today.  Patient has also reached out to both her primary care provider and pain management for refill of Percocet.  Patient was scheduled with release available pain management provider.  She has an appointment with pain management doctor Gail on 7/6/22.    See pt message from earlier today:    Mariel Reynolds I went to the [facility] emergency room on Sunday  because I was in SO much pain. I had a fusion at L5-S1 on March 7th. I'm having more pain in my back and legs. They prescribed percocet 10/325 every 6 hours. It has helped tremendously! Is there any way you could send in a refill of this medication until we have our virtual appointment on July 6th so I don't have to go to the emergency room again?  Thank you,  Carol Edmond     Patient schedule virtual visit today in the hopes of being able to obtain a bridge prescription for Percocet until she can be seen by her new pain management provider.  Denies saddle anesthesia, or incontinence.  Denies significant changes to pain quality or quantity or other associated symptoms since most recent ED visit.      she is compliant with medications daily without any adverse side effects.    Patient Care Team:  Nic Guzman MD as PCP - General (Family Medicine)    History     Past Medical History:  Past Medical History:   Diagnosis Date    Chronic back pain     H/O gastric sleeve        Past Surgical History:  Past Surgical History:   Procedure Laterality Date    abominal plasty      BACK SURGERY      BARIATRIC SURGERY      gastric sleeve      SPINAL FUSION         Social History:  Social History     Socioeconomic History    Marital status:    Tobacco Use    Smoking status: Never Smoker    Smokeless tobacco: Never Used   Substance and Sexual Activity    Alcohol use: Not Currently   Social History Narrative    ** Merged History Encounter **            Family History:  No family history on file.    Allergies and Medications: (updated and reviewed)  Review of patient's allergies indicates:   Allergen Reactions    Nsaids (non-steroidal anti-inflammatory drug)      Hx of bariatric sx     Current Outpatient Medications   Medication Sig Dispense Refill    ARIPiprazole (ABILIFY) 10 MG Tab Take 5 mg by mouth 2 (two) times daily.      busPIRone (BUSPAR) 10 MG tablet Take 10 mg by mouth 2 (two) times daily.       DULoxetine (CYMBALTA) 30 MG capsule Take 30 mg by mouth once daily.      DULoxetine (CYMBALTA) 60 MG capsule Take 60 mg by mouth once daily.      HYDROcodone-acetaminophen (NORCO) 5-325 mg per tablet Take 1 tablet by mouth every 6 (six) hours as needed for Pain. 15 tablet 0    LIDOcaine (LIDODERM) 5 % SMARTSIG:Topical      NARCAN 4 mg/actuation Spry SMARTSIG:Both Nares      oxyCODONE-acetaminophen (PERCOCET)  mg per tablet Take 1 tablet by mouth every 6 (six) hours as needed for Pain. 12 tablet 0    pregabalin (LYRICA) 150 MG capsule Take 150 mg by mouth 2 (two) times daily.      tiZANidine (ZANAFLEX) 4 MG tablet Take 4 mg by mouth 3 (three) times daily as needed.      traZODone (DESYREL) 50 MG tablet Take 50 mg by mouth nightly.       No current facility-administered medications for this visit.       Exam     Review of Systems:  (as noted above)  Review of Systems   Constitutional: Negative for diaphoresis, fever and weight loss.   HENT: Negative for trouble swallowing and voice change.    Eyes: Negative for visual disturbance.   Respiratory: Negative for chest tightness, shortness of breath and wheezing.    Cardiovascular: Negative for chest pain and palpitations.   Gastrointestinal: Negative for abdominal pain, anal bleeding, blood in stool and bowel incontinence.   Endocrine: Negative for polydipsia and polyphagia.   Genitourinary: Negative for bladder incontinence, decreased urine volume, dysuria, hematuria, pelvic pain and vaginal pain.   Musculoskeletal: Positive for back pain and gait problem (Hx of falls). Negative for neck pain.   Skin: Negative for rash and wound.   Neurological: Positive for tingling, weakness, numbness, headaches and paresthesias. Negative for seizures and speech difficulty.   Psychiatric/Behavioral: Negative for confusion, decreased concentration, self-injury and suicidal ideas. The patient is nervous/anxious.        Physical Exam:   Physical Exam  Constitutional:        General: She is not in acute distress.     Appearance: She is not toxic-appearing or diaphoretic.      Comments: Pt appears to be in pain   Psychiatric:         Mood and Affect: Mood is anxious.       There were no vitals filed for this visit.   There is no height or weight on file to calculate BMI.    Assessment & Plan   (all problems are new to me)      Problem List Items Addressed This Visit    None     Visit Diagnoses     Chronic bilateral low back pain, unspecified whether sciatica present    -  Primary    Chart review completed.  Patient has documented history of severe back pain.  As this provider is a nurse practitioner per [facility] Board of Nursing unable to prescribe narcotics for chronic pain.  Patient has been in contact with pain management and has upcoming appointment.  Advised patient to keep his appointment.  Discussed other treatment options for pain.  NSAIDs contraindicated due to history of bariatric surgery.  Discussed judicious use of Tylenol, topical analgesics such as Salonpas, uses heat, ice, rest, trigger avoidance.  Discussed red flag symptoms and what to do if they occur.    Discussed DDx, condition, and treatment     ED precautions given.   Notify provider if symptoms do not resolve or increase in severity.   Patient verbalizes understanding and agrees with plan of care.      Personal history of fall        Education provided on fall prevention. Documents sent to pt's chart.           --------------------------------------------    Health Maintenance:  Health Maintenance       Date Due Completion Date    Hepatitis C Screening Never done ---    Cervical Cancer Screening Never done ---    Lipid Panel Never done ---    HIV Screening Never done ---    TETANUS VACCINE Never done ---    Influenza Vaccine (Season Ended) 09/01/2022 ---          Health maintenance reviewed.     Follow Up:  Follow up in about 2 weeks (around 7/12/2022), or if symptoms worsen or fail to improve.      The patient  expressed understanding and no barriers to adherence were identified.      - The patient indicates understanding of these issues and agrees with the plan. Brief care plan is updated and reviewed with the patient as applicable.      - The patient is given an After Visit Summary that lists all medications with directions, allergies, education, orders placed during this encounter and follow-up instructions.      - I have reviewed the patient's medical information including past medical, family, and social history sections including the medications and allergies.      - We discussed the patient's current medications.     This note was created by combination of typed  and MModal dictation.  Transcription errors may be present.  If there are any questions, please contact me.       Jarrod Gutierrez NP

## 2022-06-29 ENCOUNTER — TELEPHONE (OUTPATIENT)
Dept: PAIN MEDICINE | Facility: CLINIC | Age: 37
End: 2022-06-29
Payer: MEDICARE

## 2022-06-29 ENCOUNTER — HOSPITAL ENCOUNTER (EMERGENCY)
Facility: HOSPITAL | Age: 37
Discharge: HOME | End: 2022-06-29
Attending: FAMILY MEDICINE
Payer: COMMERCIAL

## 2022-06-29 ENCOUNTER — PATIENT MESSAGE (OUTPATIENT)
Dept: FAMILY MEDICINE | Facility: CLINIC | Age: 37
End: 2022-06-29
Payer: MEDICARE

## 2022-06-29 VITALS
RESPIRATION RATE: 18 BRPM | BODY MASS INDEX: 39.78 KG/M2 | HEART RATE: 97 BPM | DIASTOLIC BLOOD PRESSURE: 85 MMHG | SYSTOLIC BLOOD PRESSURE: 126 MMHG | HEIGHT: 64 IN | TEMPERATURE: 99 F | WEIGHT: 233 LBS | OXYGEN SATURATION: 100 %

## 2022-06-29 DIAGNOSIS — M54.42 ACUTE BILATERAL LOW BACK PAIN WITH BILATERAL SCIATICA: ICD-10-CM

## 2022-06-29 DIAGNOSIS — M54.50 ACUTE EXACERBATION OF CHRONIC LOW BACK PAIN: Primary | ICD-10-CM

## 2022-06-29 DIAGNOSIS — G89.29 ACUTE EXACERBATION OF CHRONIC LOW BACK PAIN: Primary | ICD-10-CM

## 2022-06-29 DIAGNOSIS — M54.41 ACUTE BILATERAL LOW BACK PAIN WITH BILATERAL SCIATICA: ICD-10-CM

## 2022-06-29 PROCEDURE — 63600175 PHARM REV CODE 636 W HCPCS: Performed by: PHYSICIAN ASSISTANT

## 2022-06-29 PROCEDURE — 25000003 PHARM REV CODE 250: Performed by: PHYSICIAN ASSISTANT

## 2022-06-29 PROCEDURE — 99284 EMERGENCY DEPT VISIT MOD MDM: CPT

## 2022-06-29 PROCEDURE — 96372 THER/PROPH/DIAG INJ SC/IM: CPT | Performed by: PHYSICIAN ASSISTANT

## 2022-06-29 RX ORDER — OXYCODONE AND ACETAMINOPHEN 10; 325 MG/1; MG/1
1 TABLET ORAL EVERY 6 HOURS PRN
Qty: 20 TABLET | Refills: 0 | Status: SHIPPED | OUTPATIENT
Start: 2022-06-29 | End: 2022-07-03 | Stop reason: SDUPTHER

## 2022-06-29 RX ORDER — DEXAMETHASONE SODIUM PHOSPHATE 4 MG/ML
8 INJECTION, SOLUTION INTRA-ARTICULAR; INTRALESIONAL; INTRAMUSCULAR; INTRAVENOUS; SOFT TISSUE
Status: COMPLETED | OUTPATIENT
Start: 2022-06-29 | End: 2022-06-29

## 2022-06-29 RX ORDER — OXYCODONE AND ACETAMINOPHEN 5; 325 MG/1; MG/1
2 TABLET ORAL
Status: COMPLETED | OUTPATIENT
Start: 2022-06-29 | End: 2022-06-29

## 2022-06-29 RX ADMIN — OXYCODONE HYDROCHLORIDE AND ACETAMINOPHEN 2 TABLET: 5; 325 TABLET ORAL at 03:06

## 2022-06-29 RX ADMIN — DEXAMETHASONE SODIUM PHOSPHATE 8 MG: 4 INJECTION, SOLUTION INTRA-ARTICULAR; INTRALESIONAL; INTRAMUSCULAR; INTRAVENOUS; SOFT TISSUE at 03:06

## 2022-06-29 NOTE — TELEPHONE ENCOUNTER
----- Message from Daniela Talbert sent at 6/29/2022  6:08 AM CDT -----  Contact: Pt sent message via my [facility]  Appointment Request From: Carol Edmond    With Provider: Johanne Greene NP    Preferred Date Range: 6/28/2022 - 7/1/2022    Preferred Times: Any Time    Reason for visit: Pain management    Comments:  Pain management

## 2022-06-29 NOTE — ED TRIAGE NOTES
Pt to er c/o chronic backpain that radiates down right leg since 2017. Hx of back surgery 3/7. Pt currently wearing back brace

## 2022-06-29 NOTE — TELEPHONE ENCOUNTER
The patient was trying to reach  office based off the my chart message. Staff tried to reach pt but there was no answer.

## 2022-06-29 NOTE — ED PROVIDER NOTES
Encounter Date: 6/29/2022       History     Chief Complaint   Patient presents with    Back Pain     Pt to er c/o chronic backpain that radiates down right leg since 2017. Hx of back surgery 3/7. Pt currently wearing back brace.     36 y.o. female with a history of L-spine fusion in March by Dr. Urena presents to the ED with persistent low back pain for the last few days.  States her symptoms have been radiating to both lower extremities are she has however worked in her right eye today.  States she was seen here 3 days ago and sent home with 3 days of pain medication or has run out.  States she did a virtual visit last night with a nurse practitioner hoping to get pain medication ever was told he cannot prescribe her any him to return to the ER for pain medication if symptoms persisted.  States she has a virtual visit with a new pain management doctor on July 6 noting established, and we will see someone in person in August.  Denies bowel or bladder incontinence, saddle paresthesia, numbness, tingling, weakness.    The history is provided by the patient. No  was used.   Back Pain   This is a chronic problem. The current episode started several days ago. The problem occurs constantly. The problem has been unchanged. The pain is associated with no known injury. The pain is present in the lumbar spine. The quality of the pain is described as shooting. The pain radiates to the right leg and left leg. The pain is the same all the time. Associated symptoms include leg pain. Pertinent negatives include no bowel incontinence, no perianal numbness, no bladder incontinence, no dysuria, no pelvic pain, no paresthesias and no paresis. Treatments tried: percocet. The treatment provided significant relief. Risk factors include obesity.     Review of patient's allergies indicates:   Allergen Reactions    Nsaids (non-steroidal anti-inflammatory drug)      Hx of bariatric sx     Past Medical History:    Diagnosis Date    Chronic back pain     H/O gastric sleeve      Past Surgical History:   Procedure Laterality Date    abominal plasty      BACK SURGERY      BARIATRIC SURGERY      gastric sleeve      SPINAL FUSION       No family history on file.  Social History     Tobacco Use    Smoking status: Never Smoker    Smokeless tobacco: Never Used   Substance Use Topics    Alcohol use: Not Currently     Review of Systems   Gastrointestinal: Negative for bowel incontinence.   Genitourinary: Negative for bladder incontinence, dysuria and pelvic pain.   Musculoskeletal: Positive for back pain.   Neurological: Negative for paresthesias.       Physical Exam     Initial Vitals [06/29/22 1407]   BP Pulse Resp Temp SpO2   126/85 97 18 98.6 °F (37 °C) 100 %      MAP       --         Physical Exam    ED Course   Procedures  Labs Reviewed - No data to display       Imaging Results    None          Medications   dexamethasone injection 8 mg (8 mg Intramuscular Given 6/29/22 1540)   oxyCODONE-acetaminophen 5-325 mg per tablet 2 tablet (2 tablets Oral Given 6/29/22 1540)                 ED Course as of 06/29/22 1603   Wed Jun 29, 2022   1555 Patient is starting to feel better after Decadron IM shot as well as Percocet by mouth.  Will discharge home with Percocet for 5 days.  I encouraged her to try to take only as needed for pain relief noted straight prescription until pain management appointment.  She agrees.  All questions answered patient is stable for discharge. [MA]      ED Course User Index  [MA] Jorge Luis Guy PA-C             Clinical Impression:   Final diagnoses:  [M54.50, G89.29] Acute exacerbation of chronic low back pain (Primary)  [M54.42, M54.41] Acute bilateral low back pain with bilateral sciatica          ED Disposition Condition    Discharge Stable        ED Prescriptions     Medication Sig Dispense Start Date End Date Auth. Provider    oxyCODONE-acetaminophen (PERCOCET)  mg per tablet Take 1  tablet by mouth every 6 (six) hours as needed for Pain. 20 tablet 6/29/2022 7/4/2022 Jorge Luis Guy PA-C        Follow-up Information     Follow up With Specialties Details Why Contact Info    Nic Guzman MD Family Medicine   [address] [place] [address]  [phone]      [facility] - Emergency Dept Emergency Medicine In 1 week If symptoms worsen [id number]  [facility] [id number] [phone]    Pain Management    Keep scheduled virtual appointment with pain management doctor on July 6th for evaluation and treatment.           Jorge Luis Guy PA-C  06/29/22 8555

## 2022-07-01 ENCOUNTER — TELEPHONE (OUTPATIENT)
Dept: NEUROSURGERY | Facility: CLINIC | Age: 37
End: 2022-07-01
Payer: MEDICARE

## 2022-07-01 NOTE — TELEPHONE ENCOUNTER
"----- Message from Robert Reynolds Jr., MD sent at 7/1/2022 12:50 PM CDT -----  This patient is scheduled to see me next week.    Please call patient and let her know that we do not do opioid management by telemedicine.  Please inform her that we require in-person visits for opioid management and that we typically use telemedicine visits for follow up appointments with established patients.  The "in-person" requirement is [place] law.    She can keep the virtual appointment for non-opioid management, OR we have the following options:    1) change to an in-person visit  2) communicate with PCP regarding a bridge prescription  3) call the provider Dr. Urena referred her to and request an earlier appointment  4) seek another, local provider    Thank you,  Robert Reynolds Jr, MD  Interventional Pain Medicine / Anesthesiology      "

## 2022-07-02 ENCOUNTER — PATIENT MESSAGE (OUTPATIENT)
Dept: PAIN MEDICINE | Facility: CLINIC | Age: 37
End: 2022-07-02
Payer: MEDICARE

## 2022-07-02 ENCOUNTER — HOSPITAL ENCOUNTER (EMERGENCY)
Facility: HOSPITAL | Age: 37
Discharge: HOME | End: 2022-07-03
Attending: STUDENT IN AN ORGANIZED HEALTH CARE EDUCATION/TRAINING PROGRAM
Payer: COMMERCIAL
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
## 2022-07-02 DIAGNOSIS — Z98.1 HISTORY OF LUMBAR FUSION: ICD-10-CM

## 2022-07-02 DIAGNOSIS — M54.40 ACUTE BILATERAL LOW BACK PAIN WITH SCIATICA, SCIATICA LATERALITY UNSPECIFIED: ICD-10-CM

## 2022-07-02 LAB — VIT B1 BLD-SCNC: 88 NMOL/L (ref 70–180)

## 2022-07-02 PROCEDURE — 99285 EMERGENCY DEPT VISIT HI MDM: CPT | Mod: 25

## 2022-07-03 VITALS
OXYGEN SATURATION: 100 % | DIASTOLIC BLOOD PRESSURE: 88 MMHG | HEART RATE: 60 BPM | TEMPERATURE: 98 F | HEIGHT: 64 IN | SYSTOLIC BLOOD PRESSURE: 135 MMHG | BODY MASS INDEX: 41.85 KG/M2 | RESPIRATION RATE: 18 BRPM | WEIGHT: 245.13 LBS

## 2022-07-03 LAB
B-HCG UR QL: NEGATIVE
CTP QC/QA: YES

## 2022-07-03 PROCEDURE — 96372 THER/PROPH/DIAG INJ SC/IM: CPT

## 2022-07-03 PROCEDURE — 81025 URINE PREGNANCY TEST: CPT | Performed by: STUDENT IN AN ORGANIZED HEALTH CARE EDUCATION/TRAINING PROGRAM

## 2022-07-03 PROCEDURE — 63600175 PHARM REV CODE 636 W HCPCS

## 2022-07-03 RX ORDER — ONDANSETRON 4 MG/1
4 TABLET, ORALLY DISINTEGRATING ORAL
Status: DISCONTINUED | OUTPATIENT
Start: 2022-07-03 | End: 2022-07-03 | Stop reason: HOSPADM

## 2022-07-03 RX ORDER — LIDOCAINE 560 MG/1
1 PATCH PERCUTANEOUS; TOPICAL; TRANSDERMAL DAILY
Qty: 15 PATCH | Refills: 0 | Status: SHIPPED | OUTPATIENT
Start: 2022-07-03 | End: 2022-07-19

## 2022-07-03 RX ORDER — MORPHINE SULFATE 4 MG/ML
INJECTION, SOLUTION INTRAMUSCULAR; INTRAVENOUS
Status: COMPLETED
Start: 2022-07-03 | End: 2022-07-03

## 2022-07-03 RX ORDER — OXYCODONE AND ACETAMINOPHEN 10; 325 MG/1; MG/1
1 TABLET ORAL
Status: DISCONTINUED | OUTPATIENT
Start: 2022-07-03 | End: 2022-07-03 | Stop reason: HOSPADM

## 2022-07-03 RX ORDER — METHOCARBAMOL 750 MG/1
750 TABLET, FILM COATED ORAL 3 TIMES DAILY
Qty: 30 TABLET | Refills: 0 | Status: SHIPPED | OUTPATIENT
Start: 2022-07-03 | End: 2022-07-13

## 2022-07-03 RX ORDER — OXYCODONE AND ACETAMINOPHEN 10; 325 MG/1; MG/1
1 TABLET ORAL EVERY 8 HOURS PRN
Qty: 18 TABLET | Refills: 0 | Status: SHIPPED | OUTPATIENT
Start: 2022-07-03 | End: 2022-07-06 | Stop reason: SDUPTHER

## 2022-07-03 RX ORDER — MORPHINE SULFATE 4 MG/ML
4 INJECTION, SOLUTION INTRAMUSCULAR; INTRAVENOUS ONCE
Status: COMPLETED | OUTPATIENT
Start: 2022-07-03 | End: 2022-07-03

## 2022-07-03 RX ADMIN — MORPHINE SULFATE 4 MG: 4 INJECTION INTRAVENOUS at 03:07

## 2022-07-03 RX ADMIN — MORPHINE SULFATE 4 MG: 4 INJECTION, SOLUTION INTRAMUSCULAR; INTRAVENOUS at 03:07

## 2022-07-03 NOTE — DISCHARGE INSTRUCTIONS
Follow up with pain management.      Use Robaxin as needed for pain.    Use lidocaine patches as needed.    Return to the emergency department if you have any lower extremity weakness, numbness, fever, loss of bladder or bowel function, or any other symptoms.

## 2022-07-03 NOTE — FIRST PROVIDER EVALUATION
"Medical screening exam completed.  I have conducted a focused provider triage encounter, findings are as follows:    Brief history of present illness:  37 yo female presents to ED for evaluation of low back pain radiating to her leg after trip and fall today. Patient reports to having lumbar fusion with cage device placed. Neurosurgeon, Dr. Urena.     Vitals:    07/02/22 2229   BP: (!) 134/92   BP Location: Left arm   Patient Position: Sitting   Pulse: 89   Resp: 20   Temp: 98.2 °F (36.8 °C)   TempSrc: Oral   SpO2: 100%   Weight: 111.2 kg (245 lb 2.4 oz)   Height: 5' 4.17" (1.63 m)       Pertinent physical exam:  Awake, alert and oriented. Ambulated into triage.     Brief workup plan:  CT lumbar, UPT    Preliminary workup initiated; this workup will be continued and followed by the physician or advanced practice provider that is assigned to the patient when roomed.  "

## 2022-07-03 NOTE — ED PROVIDER NOTES
Encounter Date: 7/2/2022       History     Chief Complaint   Patient presents with    Fall     Patient had a fall in a store.  Complaint of back pain.  Did have back surgery in March.         Back Pain   This is a chronic problem. The current episode started several weeks ago. The problem occurs most days. The problem has been unchanged. The pain is associated with falling. The pain is present in the lumbar spine. The quality of the pain is described as stabbing and shooting. The pain is at a severity of 10/10. Pertinent negatives include no chest pain, no fever, no abdominal pain, no dysuria and no weakness. She has tried NSAIDs for the symptoms. The treatment provided no relief.      Patient is a 36-year-old female past medical history of spinal fusion, gastric sleeve, chronic back pain presents to the emergency department for continued lower back pain.  States back pain severe, in addition had a fall today in store.  Denies any bladder or bowel incontinence, saddle anesthesia, numbness, lower extremity weakness.  Patient states she is out of pain medication, has an appointment with pain management on Wednesday.  States more pain issue rather than weakness issue.  Denies any fever, or any other complaints at this time.    Review of patient's allergies indicates:   Allergen Reactions    Nsaids (non-steroidal anti-inflammatory drug)      Hx of bariatric sx     Past Medical History:   Diagnosis Date    Chronic back pain     H/O gastric sleeve      Past Surgical History:   Procedure Laterality Date    abominal plasty      BACK SURGERY      BARIATRIC SURGERY      gastric sleeve      SPINAL FUSION       No family history on file.  Social History     Tobacco Use    Smoking status: Never Smoker    Smokeless tobacco: Never Used   Substance Use Topics    Alcohol use: Not Currently     Review of Systems   Constitutional: Negative for fever.   HENT: Negative for sore throat.    Eyes: Negative for visual  disturbance.   Respiratory: Negative for shortness of breath.    Cardiovascular: Negative for chest pain.   Gastrointestinal: Negative for abdominal pain.   Genitourinary: Negative for dysuria.   Musculoskeletal: Positive for back pain. Negative for joint swelling.   Skin: Negative for rash.   Neurological: Negative for weakness.   Psychiatric/Behavioral: Negative for confusion.   All other systems reviewed and are negative.      Physical Exam     Initial Vitals [07/02/22 2229]   BP Pulse Resp Temp SpO2   (!) 134/92 89 20 98.2 °F (36.8 °C) 100 %      MAP       --         Physical Exam    Nursing note and vitals reviewed.  Constitutional: She appears well-developed and well-nourished.   HENT:   Head: Normocephalic and atraumatic.   Eyes: EOM are normal. Pupils are equal, round, and reactive to light.   Neck:   Normal range of motion.  Cardiovascular: Normal rate, regular rhythm, normal heart sounds and intact distal pulses.   No murmur heard.  Pulmonary/Chest: Breath sounds normal. No respiratory distress. She has no wheezes. She has no rales.   Abdominal: Abdomen is soft. She exhibits no distension. There is no abdominal tenderness. There is no rebound.   Musculoskeletal:         General: Tenderness present. No edema. Normal range of motion.      Cervical back: Normal range of motion.      Comments: L spine paraspinal TTP.       Neurological: She is alert. She has normal strength. No cranial nerve deficit. GCS score is 15. GCS eye subscore is 4. GCS verbal subscore is 5. GCS motor subscore is 6.   Skin: Skin is warm and dry. Capillary refill takes less than 2 seconds. No rash noted. No erythema.   Psychiatric: She has a normal mood and affect.         ED Course   Procedures  Labs Reviewed   POCT URINE PREGNANCY          Imaging Results          CT Lumbar Spine Without Contrast (Final result)  Result time 07/03/22 11:08:24    Final result by Tejas Bernard MD (07/03/22 11:08:24)                 Impression:      No  fractures.    No significant interval change from 06/13/2022.    ________________________________________    Concur with preliminary [facility] radiology interpretation.      Electronically signed by: Tejas Bernard  Date:    07/03/2022  Time:    11:08             Narrative:    EXAMINATION:  CT LUMBAR SPINE WITHOUT CONTRAST    CLINICAL HISTORY:  Lumbar radiculopathy, trauma;    TECHNIQUE:  Axial scans were obtained through the lumbar spine.    Coronal and sagittal reconstructions obtained from the axial data.    Automatic exposure control was utilized to limit radiation dose.    Contrast: None    Radiation Dose:    Total DLP: 1120 mGy*cm    COMPARISON:  Lumbar spine CT 06/13/2022    FINDINGS:  Number of lumbar vertebral bodies: 5.    Alignment: Normal lordosis. No scoliosis.    Soft tissues: No abnormalities.    Sacroiliac joints: Normal.    Vertebrae:    No fractures, infection or neoplasm.    Status post interbody fusion at L5-S1.    Degenerative changes:    No significant canal or foraminal narrowing.                    Preliminary result by Tejas Bernard MD (07/03/22 01:48:38)                 Narrative:    START OF REPORT:  Technique: CT of the lumbar spine was performed without intravenous contrast with direct axial as well as sagittal and coronal reconstruction images.    Comparison: Comparison is with prior CT study dated2022-06-13 22:14:33.    Clinical history: Low back pain radiating to her leg after trip and fall today. Patient reports to having lumbar fusion with cage device placed.    Findings:  Anatomy: Unremarkable.  Mineralization: The bony mineralization is within normal limits.  Bone alignment: Unremarkable with no listhesis.  Curvature: The lumbar lordosis is maintained.  Bone and bone marrow: The vertebral body heights are maintained.  Intervertebral disc spaces: The intervertebral discs are preserved throughout.  Osteophytes: Multilevel marginal osteophytes are seen.  Facet degenerative changes:  Mild multilevel facet degenerative changes are seen.  Spinal canal: Unremarkable with no bony spinal canal stenosis identified.  Fractures: No acute fracture dislocation or subluxation is seen.  Orthopedic Hardware: Interbody fusion device is again seen at L5-S1.  Findings at specific levels:  Visualized sacrum: Normal.      Impression:  1. Mild lumbar spondylosis. No canal stenosis or neural foraminal narrowing. Details as above.                                   Medications   morphine injection 4 mg (4 mg Intramuscular Given 7/3/22 0300)     Medical Decision Making:   ED Management:  Patient is a 36-year-old female presents to emergency department for back pain.  See HPI.  See physical exam.  Patient with recent MRI few weeks ago.  States out of pain medication at this time.  States he is trying to get a follow-up appointment with pain management, as currently scheduled for Wednesday with Dr. Reynolds.  No alarm features of back pain.  No bladder or bowel incontinence.  No fever.  Patient given pain medication with pain control.  Shared decision making used to determine disposition.  She has had recent advanced imaging without evidence of canal stenosis, appears to be primarily pain control today.  Discussed will write course of pain medication however patient does have multiple trips to the emergency department for pain medication.  Discussed chronic pain needs to be managed by pain management.  Discussed with pharmacy.  Reviewed records in epic and patient does indeed have a appointment with pain management next week.  Discussed return to the emergency department immediately if any numbness, weakness, fever, bladder or bowel incontinence, or in symptoms.  Discussed need for follow-up with neurosurgery.  Reassessed patient.  Patient is resting comfortably.  Discussed all results.   Answered all questions at this time.  Hemodynamically stable for continued outpatient management with strict return precautions.   Patient verbalized understanding agreed to plan.                        Clinical Impression:   Final diagnoses:  [W19.XXXA] Fall, initial encounter (Primary)  [M54.40] Acute bilateral low back pain with sciatica, sciatica laterality unspecified  [Z98.1] History of lumbar fusion          ED Disposition Condition    Discharge Stable        ED Prescriptions     Medication Sig Dispense Start Date End Date Auth. Provider    oxyCODONE-acetaminophen (PERCOCET)  mg per tablet () Take 1 tablet by mouth every 8 (eight) hours as needed for Pain. 18 tablet 7/3/2022 2022 Nael Pierre MD    methocarbamoL (ROBAXIN) 750 MG Tab Take 1 tablet (750 mg total) by mouth 3 (three) times daily. for 10 days 30 tablet 7/3/2022 2022 Nael Pierre MD    LIDOcaine 4 % PtMd Apply 1 patch topically Daily. for 15 days 15 patch 7/3/2022 2022 Nael Pierre MD        Follow-up Information     Follow up With Specialties Details Why Contact Info    Nic Guzman MD Family Medicine Schedule an appointment as soon as possible for a visit in 1 day  [address] [place] [address]  [phone]      Robert Reynolds Jr., MD Pain Medicine   [address]  [address]  [place] [address] [phone]      Riana Urena MD Neurosurgery   [address] [facility] [address]  [place] [id number] [phone]             Nael Pierre MD  22 [id number]

## 2022-07-05 ENCOUNTER — TELEPHONE (OUTPATIENT)
Dept: PAIN MEDICINE | Facility: CLINIC | Age: 37
End: 2022-07-05
Payer: MEDICARE

## 2022-07-05 NOTE — TELEPHONE ENCOUNTER
Spoke with patient about her appointment today. Stated she was coming from where she lives due to the pain provider she is established with not having an opening until August. She was informed the role of IPM, plan of care and treatment. Confirmed she is 4 months post back surgery and has been discharged from the surgeon's service. Said she has been going to the ED for pain issues but they are not able to prescribe pain medications for more than a week and to obtain more until she is able to see her pain provider she would need to go to the ED weekly. Patient was made aware that she should continue to see her provider she is established with.

## 2022-07-06 ENCOUNTER — OFFICE VISIT (OUTPATIENT)
Dept: PAIN MEDICINE | Facility: CLINIC | Age: 37
End: 2022-07-06
Payer: COMMERCIAL

## 2022-07-06 VITALS
SYSTOLIC BLOOD PRESSURE: 139 MMHG | WEIGHT: 245.13 LBS | DIASTOLIC BLOOD PRESSURE: 88 MMHG | HEART RATE: 105 BPM | BODY MASS INDEX: 41.85 KG/M2

## 2022-07-06 DIAGNOSIS — M54.16 LUMBAR RADICULOPATHY: ICD-10-CM

## 2022-07-06 DIAGNOSIS — M54.50 DORSALGIA OF LUMBOSACRAL REGION: Primary | ICD-10-CM

## 2022-07-06 DIAGNOSIS — Z98.1 STATUS POST LUMBAR SPINAL FUSION: ICD-10-CM

## 2022-07-06 PROCEDURE — 99204 OFFICE O/P NEW MOD 45 MIN: CPT | Mod: S$GLB,,, | Performed by: PAIN MEDICINE

## 2022-07-06 PROCEDURE — 3075F SYST BP GE 130 - 139MM HG: CPT | Mod: CPTII,S$GLB,, | Performed by: PAIN MEDICINE

## 2022-07-06 PROCEDURE — 99999 PR PBB SHADOW E&M-EST. PATIENT-LVL III: ICD-10-PCS | Mod: PBBFAC,,, | Performed by: PAIN MEDICINE

## 2022-07-06 PROCEDURE — 3075F PR MOST RECENT SYSTOLIC BLOOD PRESS GE 130-139MM HG: ICD-10-PCS | Mod: CPTII,S$GLB,, | Performed by: PAIN MEDICINE

## 2022-07-06 PROCEDURE — 3079F DIAST BP 80-89 MM HG: CPT | Mod: CPTII,S$GLB,, | Performed by: PAIN MEDICINE

## 2022-07-06 PROCEDURE — 3079F PR MOST RECENT DIASTOLIC BLOOD PRESSURE 80-89 MM HG: ICD-10-PCS | Mod: CPTII,S$GLB,, | Performed by: PAIN MEDICINE

## 2022-07-06 PROCEDURE — 3008F BODY MASS INDEX DOCD: CPT | Mod: CPTII,S$GLB,, | Performed by: PAIN MEDICINE

## 2022-07-06 PROCEDURE — 3008F PR BODY MASS INDEX (BMI) DOCUMENTED: ICD-10-PCS | Mod: CPTII,S$GLB,, | Performed by: PAIN MEDICINE

## 2022-07-06 PROCEDURE — 99999 PR PBB SHADOW E&M-EST. PATIENT-LVL III: CPT | Mod: PBBFAC,,, | Performed by: PAIN MEDICINE

## 2022-07-06 PROCEDURE — 99204 PR OFFICE/OUTPT VISIT, NEW, LEVL IV, 45-59 MIN: ICD-10-PCS | Mod: S$GLB,,, | Performed by: PAIN MEDICINE

## 2022-07-06 RX ORDER — OXYCODONE AND ACETAMINOPHEN 10; 325 MG/1; MG/1
1 TABLET ORAL EVERY 8 HOURS PRN
Qty: 90 TABLET | Refills: 0 | Status: SHIPPED | OUTPATIENT
Start: 2022-07-06 | End: 2022-07-30

## 2022-07-06 RX ORDER — PREGABALIN 150 MG/1
150 CAPSULE ORAL 3 TIMES DAILY
Qty: 90 CAPSULE | Refills: 1 | Status: SHIPPED | OUTPATIENT
Start: 2022-07-06 | End: 2023-07-13 | Stop reason: ALTCHOICE

## 2022-07-06 NOTE — PROGRESS NOTES
[facility]    Referred by: Aaarefheavenal Self   Reason for referral: * No diagnoses found *     CC: Bilateral low back pain with radiation into both legs    Subjective:   Carol Edmond is a 36 y.o. female who has a past medical history of Chronic back pain and H/O gastric sleeve. She complains of pain as described below. Her pain started in 2017 and was localized to her lower back with rare radiating pain into her legs. She was treated with chronic Norco for the pain with moderate relief. She had been evaluated by Pain Medicine in [place], where she had 3 rounds of L3/4 AGNES which gave moderate relief for 2-3 months on the first dose and minimal relief after the 2nd and 3rd injections. She then was evaluated by surgery who recommended lumbar fusion, which she had completed 03/2022. After the procedure, her pain was the same and she began to have worsening radicular pains daily.    She is currently completing PT in [place] for her back. She endorses significant weight re-gain after her procedure this Spring. Her primary Care physician recommended seeing a Pain Medicine physician because her pain had become too complex for their comfort level to address.    Location: mid low back   Onset: 2017  Radiation: bilateral leg pain  Timing: constant  Current Pain Score: 10/10  Weekly Pain Range: 9-10/10  Quality: Sharp and Shooting  Worsened by: sitting and standing  Improved by: medications and lying down     Previous Therapies:  PT/OT: Currently in PT  HEP: Minimal, worsening pain since surgery has limited her ability to continue previous HEP  TENS: None  Interventions: L3/4 AGNES x3 with decreasing relief, last gave minimal relief  Surgery: L5/S1 fusion  Opioids: Previously taking Norco, recently prescribed Percocet by ED. Both give moderate relief  Adjuvants: Zanaflex 4mg TID PRN, Cymbalta 90 mg daily, Lyrica 150 mg BID    Current Pain Medications:  1. Percocet  mg PRN  2. Zanaflex 4  mg PRN  3. Cymbalta 90 mg daily  4. Lyrica 150 mg twice daily    Assessment & Plan:  Problem List Items Addressed This Visit     Dorsalgia of lumbosacral region - Primary    Relevant Medications    oxyCODONE-acetaminophen (PERCOCET)  mg per tablet    Lumbar radiculopathy    Relevant Medications    pregabalin (LYRICA) 150 MG capsule    oxyCODONE-acetaminophen (PERCOCET)  mg per tablet    Other Relevant Orders    EMG W/ ULTRASOUND AND NERVE CONDUCTION TEST 2 Extremities    Status post lumbar spinal fusion    Relevant Medications    pregabalin (LYRICA) 150 MG capsule    oxyCODONE-acetaminophen (PERCOCET)  mg per tablet    Other Relevant Orders    EMG W/ ULTRASOUND AND NERVE CONDUCTION TEST 2 Extremities        7/6/22 -Carol Edmond is a 37 y.o. female who  has a past medical history of Chronic back pain and H/O gastric sleeve.  By history and examination this patient has chronic low back pain with radiculopathy s/p surgical decompression.  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  There may be scar tissue development which would require MRI with contrast to identify.  We discussed the underlying diagnoses and multiple treatment options including non-opioid medications, opioid medications and interventional procedures.  EMG will be very helpful with diagnosing the problem.  The risks and benefits of each treatment option were discussed and all questions were answered.      The addictive potential of the medications were discussed and Ms Edmond agree to inform us or the PCP if any compulsion to taking his medications for any reason other than for pain arises. The patient agrees to safe-guard all medications from unintentional or intentional use or misuse by other persons who may potentially have access to their premises, including but not limited to significant others, children, parents, friends, and  even strangers. The patient takes sole responsibility for any consequences that may occur from not doing so.    1. EMG ordered for bilat LE  2. Lyrica 150 mg PO TID PRN  3. Oxycodone  mg TID PRN trial  4. Cont Duloxetine 90 mg      Follow Up: 4 weeks    Robert Reynolds Jr, MD  Interventional Pain Medicine / Anesthesiology      Disclaimer: This note was partly generated using dictation software which may occasionally result in transcription errors.    [email or web address]  CT LUMBAR SPINE WITHOUT CONTRAST     CLINICAL HISTORY:  Lumbar radiculopathy, trauma;     TECHNIQUE:  Axial scans were obtained through the lumbar spine.     Coronal and sagittal reconstructions obtained from the axial data.     Automatic exposure control was utilized to limit radiation dose.     Contrast: None     Radiation Dose:     Total DLP: 1120 mGy*cm     COMPARISON:  Lumbar spine CT 2022     FINDINGS:  Number of lumbar vertebral bodies: 5.     Alignment: Normal lordosis. No scoliosis.     Soft tissues: No abnormalities.     Sacroiliac joints: Normal.     Vertebrae:     No fractures, infection or neoplasm.     Status post interbody fusion at L5-S1.     Degenerative changes:     No significant canal or foraminal narrowing.     Impression:     No fractures.     No significant interval change from 2022.      2022  MRI W W/o Contrast L Spine  FINDINGS:  For the purpose of this report, the most inferior well  developed intervertebral disc space is presumed to represent L5-S1.  There are operative changes with L5-S1 disc spacer which generates  ferromagnetic artifacts causing limited assessment of the L5-S1  operative level.  Lumbar vertebrae stature and alignment is  maintained. The visualized thoracic cord is unremarkable. The conus  medullaris terminates at L1.  Disc segmental analysis is given below:     At L1-L2, disc height is preserved. Central canal is not stenosed.  There are no narrowings of the neuroforamen.      At L2-L3, disc is unremarkable. Central canal is not stenosed. There  are no narrowings of the neuroforamen.     At L3-L4, there is disc bulge with central annular fissure which  flattens and mildly compress the ventral thecal sac. There is  ligamentum flavum thickening and facets arthropathy. These findings  combine to cause mild to moderate central canal stenosis. There are no  narrowings of the neuroforamen.     At L4-L5, there is disc bulge which flattens and mildly compress the  ventral thecal sac. There is right paracentral associated annular  fissure. There is also ligamentum flavum thickening and facets  arthropathy. These findings result in mild to moderate central canal  stenosis.     At L5-S1, there are postoperative changes of disc spacer which cause  considerable ferromagnetic artifacts. Precontrast T1-weighted and  postcontrast T1-weighted images are nondiagnostic. Consequently,  possibility of epidural inflammation or collection is difficult to  assess. On the axial T2-weighted images there is a decompression  laminectomy defect. There is no significant central canal stenosis.  Limited assessment of the neuroforamen due to artifacts could     IMPRESSION:     Lumbar operative changes, degenerative disc disease and spondylosis  level by level discussed above.    Review of Systems:  Review of Systems   Constitutional: Negative for chills, fever and weight loss.   Respiratory: Negative for cough and shortness of breath.    Cardiovascular: Negative for chest pain, palpitations and leg swelling.   Gastrointestinal: Negative for abdominal pain, nausea and vomiting.   Genitourinary: Negative for dysuria and frequency.   Musculoskeletal: Positive for back pain. Negative for myalgias.   Neurological: Negative for dizziness, tingling and weakness.   Psychiatric/Behavioral: Negative for depression. The patient is not nervous/anxious.        Physical Exam:  There were no vitals filed for this  visit.  General    Nursing note and vitals reviewed.  Constitutional: She is oriented to person, place, and time. She appears well-developed and well-nourished. No distress.   HENT:   Head: Normocephalic and atraumatic.   Nose: Nose normal.   Eyes: Conjunctivae and EOM are normal. Pupils are equal, round, and reactive to light. Right eye exhibits no discharge. Left eye exhibits no discharge. No scleral icterus.   Neck: No JVD present.   Cardiovascular: Intact distal pulses.    Pulmonary/Chest: Effort normal. No respiratory distress.   Abdominal: She exhibits no distension.   Neurological: She is alert and oriented to person, place, and time. Coordination normal.   Psychiatric: She has a normal mood and affect. Her behavior is normal. Judgment and thought content normal.     General Musculoskeletal Exam   Gait: normal     Back (L-Spine & T-Spine) / Neck (C-Spine) Exam     Tenderness Right paramedian tenderness of the Lower L-Spine. Left paramedian tenderness of the Lower L-Spine.     Back (L-Spine & T-Spine) Range of Motion   Back extension: facet loading is positive and exacerabtes/reproduces the patient's typical low back pain    Back flexion: limited ROM but partial relief of low back pain noted.     Spinal Sensation   Right Side Sensation  L-Spine Level: normal  Left Side Sensation  L-Spine Level: normal    Other She has no scoliosis .      Muscle Strength   Right Lower Extremity   Hip Flexion: 5/5   Hip Extensors: 5/5  Quadriceps:  5/5   Hamstrin/5   Gastrocsoleus:  5/5   Left Lower Extremity   Hip Flexion: 5/5   Hip Extensors: 5/5  Quadriceps:  5/5   Hamstrin/5   Gastrocsoleus:  5/5     Reflexes     Left Side  Achilles:  2+  Quadriceps:  2+    Right Side   Achilles:  2+  Quadriceps:  2+

## 2022-07-07 ENCOUNTER — TELEPHONE (OUTPATIENT)
Dept: PAIN MEDICINE | Facility: CLINIC | Age: 37
End: 2022-07-07
Payer: MEDICARE

## 2022-07-07 NOTE — TELEPHONE ENCOUNTER
Pt is scheduled to see Dr. Reynolds 8/1 at 915 am. Pt voiced understanding and confirmed appt date and time.

## 2022-07-07 NOTE — TELEPHONE ENCOUNTER
----- Message from Jocelyn Magana sent at 7/7/2022 11:20 AM CDT -----  Type:  Patient Returning Call    Who Called: pt  Who Left Message for Patient: office  Does the patient know what this is regarding?: imaging appt    Would the patient rather a call back or a response via [facility]? call  Best Call Back Number: [phone]  Additional Information:  pt called back to give suggestion of where she can probably get Test done at [facility] [address], [place] [address] [phone]  or [facility] in [place]      Pt also said script for tiZANidine (ZANAFLEX) 4 MG tablet  Was not received by Pharmacy

## 2022-07-07 NOTE — TELEPHONE ENCOUNTER
----- Message from Ynes Pierre sent at 7/6/2022  6:00 PM CDT -----  Type:  Needs Medical Advice    Who Called: Pt  Symptoms (please be specific): Pt wants to know when her next visit will be. Also has questions about medication  Would the patient rather a call back or a response via [facility]? call  Best Call Back Number: [phone]  Additional Information: n/a

## 2022-07-08 RX ORDER — TIZANIDINE 4 MG/1
4 TABLET ORAL DAILY PRN
Qty: 30 TABLET | Refills: 0 | Status: SHIPPED | OUTPATIENT
Start: 2022-07-08 | End: 2022-08-10 | Stop reason: SDUPTHER

## 2022-07-14 ENCOUNTER — PATIENT MESSAGE (OUTPATIENT)
Dept: PAIN MEDICINE | Facility: CLINIC | Age: 37
End: 2022-07-14
Payer: COMMERCIAL

## 2022-07-19 ENCOUNTER — OFFICE VISIT (OUTPATIENT)
Dept: NEUROSURGERY | Facility: CLINIC | Age: 37
End: 2022-07-19
Payer: COMMERCIAL

## 2022-07-19 VITALS — TEMPERATURE: 98 F | WEIGHT: 244.69 LBS | BODY MASS INDEX: 41.77 KG/M2 | HEIGHT: 64 IN

## 2022-07-19 DIAGNOSIS — M54.40 BILATERAL LOW BACK PAIN WITH SCIATICA, SCIATICA LATERALITY UNSPECIFIED, UNSPECIFIED CHRONICITY: Primary | ICD-10-CM

## 2022-07-19 PROCEDURE — 1159F MED LIST DOCD IN RCRD: CPT | Mod: CPTII,S$GLB,, | Performed by: NEUROLOGICAL SURGERY

## 2022-07-19 PROCEDURE — 1159F PR MEDICATION LIST DOCUMENTED IN MEDICAL RECORD: ICD-10-PCS | Mod: CPTII,S$GLB,, | Performed by: NEUROLOGICAL SURGERY

## 2022-07-19 PROCEDURE — 3008F PR BODY MASS INDEX (BMI) DOCUMENTED: ICD-10-PCS | Mod: CPTII,S$GLB,, | Performed by: NEUROLOGICAL SURGERY

## 2022-07-19 PROCEDURE — 99999 PR PBB SHADOW E&M-EST. PATIENT-LVL III: ICD-10-PCS | Mod: PBBFAC,,, | Performed by: NEUROLOGICAL SURGERY

## 2022-07-19 PROCEDURE — 3008F BODY MASS INDEX DOCD: CPT | Mod: CPTII,S$GLB,, | Performed by: NEUROLOGICAL SURGERY

## 2022-07-19 PROCEDURE — 99203 OFFICE O/P NEW LOW 30 MIN: CPT | Mod: S$GLB,,, | Performed by: NEUROLOGICAL SURGERY

## 2022-07-19 PROCEDURE — 99999 PR PBB SHADOW E&M-EST. PATIENT-LVL III: CPT | Mod: PBBFAC,,, | Performed by: NEUROLOGICAL SURGERY

## 2022-07-19 PROCEDURE — 99203 PR OFFICE/OUTPT VISIT, NEW, LEVL III, 30-44 MIN: ICD-10-PCS | Mod: S$GLB,,, | Performed by: NEUROLOGICAL SURGERY

## 2022-07-19 NOTE — PROGRESS NOTES
NEUROSURGICAL OUTPATIENT CONSULTATION NOTE    DATE OF SERVICE:  07/19/2022    ATTENDING PHYSICIAN:  Peña Hdz MD    CONSULT REQUESTED BY:  Robert Reynolds Jr.     REASON FOR CONSULT:  Back pain s/p ALIF    HISTORY OF PRESENT ILLNESS:  This is a very pleasant 37 y.o. female who reports h/o s/p ALIF in March 22 in [place].  Patient here for second opinion as she notes continued pain.  Preop symptoms were paraspinal lumbar pain, postop she reports central lumbar pain and possible new intermittent radicular pain.  She reports location down the back of thighs.  She has had multiple ED visits as well as CT scans.  She has been seen by her surgeon last month.  She is referred to our pain management dept who requests a second opinion.    PAST MEDICAL HISTORY:  Active Ambulatory Problems     Diagnosis Date Noted    No Active Ambulatory Problems     Resolved Ambulatory Problems     Diagnosis Date Noted    No Resolved Ambulatory Problems     Past Medical History:   Diagnosis Date    Chronic back pain     H/O gastric sleeve        PAST SURGICAL HISTORY:  Past Surgical History:   Procedure Laterality Date    abominal plasty      BACK SURGERY      BARIATRIC SURGERY      gastric sleeve      SPINAL FUSION         SOCIAL HISTORY:   Social History     Socioeconomic History    Marital status:    Tobacco Use    Smoking status: Never Smoker    Smokeless tobacco: Never Used   Substance and Sexual Activity    Alcohol use: Not Currently   Social History Narrative    ** Merged History Encounter **            FAMILY HISTORY:  History reviewed. No pertinent family history.    CURRENTS MEDICATIONS:  Current Outpatient Medications on File Prior to Visit   Medication Sig Dispense Refill    ARIPiprazole (ABILIFY) 10 MG Tab Take 5 mg by mouth 2 (two) times daily.      DULoxetine (CYMBALTA) 30 MG capsule Take 30 mg by mouth once daily.      DULoxetine (CYMBALTA) 60 MG capsule Take 60 mg by mouth once daily.       oxyCODONE-acetaminophen (PERCOCET)  mg per tablet Take 1 tablet by mouth every 8 (eight) hours as needed for Pain. 90 tablet 0    pregabalin (LYRICA) 150 MG capsule Take 1 capsule (150 mg total) by mouth 3 (three) times daily. 90 capsule 1    tiZANidine (ZANAFLEX) 4 MG tablet Take 1 tablet (4 mg total) by mouth daily as needed. 30 tablet 0    traZODone (DESYREL) 50 MG tablet Take 50 mg by mouth nightly.      [DISCONTINUED] busPIRone (BUSPAR) 10 MG tablet Take 10 mg by mouth 2 (two) times daily.      [DISCONTINUED] LIDOcaine 4 % PtMd Apply 1 patch topically Daily. for 15 days 15 patch 0    [DISCONTINUED] NARCAN 4 mg/actuation Spry SMARTSIG:Both Nares       No current facility-administered medications on file prior to visit.       ALLERGIES:  Review of patient's allergies indicates:   Allergen Reactions    Nsaids (non-steroidal anti-inflammatory drug)      Hx of bariatric sx       REVIEW OF SYSTEMS:  ROS - per HPI    OBJECTIVE:    PHYSICAL EXAMINATION:   Vitals:    07/19/22 0958   Temp: 98 °F (36.7 °C)       Neurologic Exam  Incisions, anterior and posterior are well healed  Motor is breakaway in all LE muscle groups  Sensation to LT intact      DIAGNOSTIC DATA:  I personally interpreted the following imaging:     All lumbar CT and MRI imaged was reviewed - theses were all postoperative    This implant is not familiar to me, but is in appropriate location without foraminal encroachment.    ASSESMENT:  This is a 37 y.o. female with     Problem List Items Addressed This Visit    None     Visit Diagnoses     Bilateral low back pain with sciatica, sciatica laterality unspecified, unspecified chronicity    -  Primary          PLAN:  Back pain following ALIF.  To my review of her imaging, the interbody cage is in good position.  It is not clear to me how this cage is fixated, and I worry she might develop pseudarthrosis, but I do not see a reason for further imaging, as the foraminal spaces are wide open.    Would recommend routine follow-up for fusion with her surgeon, who would be more familiar with what was implanted.          Peña Hdz MD, FAANS    Disclaimer: This note was partly generated using dictation software which may occasionally result in transcription errors.

## 2022-07-20 ENCOUNTER — OFFICE VISIT (OUTPATIENT)
Dept: PAIN MEDICINE | Facility: CLINIC | Age: 37
End: 2022-07-20
Payer: COMMERCIAL

## 2022-07-20 VITALS
WEIGHT: 244.69 LBS | SYSTOLIC BLOOD PRESSURE: 114 MMHG | HEART RATE: 97 BPM | BODY MASS INDEX: 42 KG/M2 | DIASTOLIC BLOOD PRESSURE: 77 MMHG

## 2022-07-20 DIAGNOSIS — M54.16 LUMBAR RADICULOPATHY: Primary | ICD-10-CM

## 2022-07-20 DIAGNOSIS — Z98.1 STATUS POST LUMBAR SPINAL FUSION: ICD-10-CM

## 2022-07-20 PROBLEM — M54.50 DORSALGIA OF LUMBOSACRAL REGION: Status: ACTIVE | Noted: 2022-07-20

## 2022-07-20 PROCEDURE — 99214 OFFICE O/P EST MOD 30 MIN: CPT | Mod: S$GLB,,, | Performed by: PAIN MEDICINE

## 2022-07-20 PROCEDURE — 99214 PR OFFICE/OUTPT VISIT, EST, LEVL IV, 30-39 MIN: ICD-10-PCS | Mod: S$GLB,,, | Performed by: PAIN MEDICINE

## 2022-07-20 PROCEDURE — 3078F DIAST BP <80 MM HG: CPT | Mod: CPTII,S$GLB,, | Performed by: PAIN MEDICINE

## 2022-07-20 PROCEDURE — 3074F PR MOST RECENT SYSTOLIC BLOOD PRESSURE < 130 MM HG: ICD-10-PCS | Mod: CPTII,S$GLB,, | Performed by: PAIN MEDICINE

## 2022-07-20 PROCEDURE — 3078F PR MOST RECENT DIASTOLIC BLOOD PRESSURE < 80 MM HG: ICD-10-PCS | Mod: CPTII,S$GLB,, | Performed by: PAIN MEDICINE

## 2022-07-20 PROCEDURE — 3008F BODY MASS INDEX DOCD: CPT | Mod: CPTII,S$GLB,, | Performed by: PAIN MEDICINE

## 2022-07-20 PROCEDURE — 99999 PR PBB SHADOW E&M-EST. PATIENT-LVL III: ICD-10-PCS | Mod: PBBFAC,,, | Performed by: PAIN MEDICINE

## 2022-07-20 PROCEDURE — 3008F PR BODY MASS INDEX (BMI) DOCUMENTED: ICD-10-PCS | Mod: CPTII,S$GLB,, | Performed by: PAIN MEDICINE

## 2022-07-20 PROCEDURE — 1159F PR MEDICATION LIST DOCUMENTED IN MEDICAL RECORD: ICD-10-PCS | Mod: CPTII,S$GLB,, | Performed by: PAIN MEDICINE

## 2022-07-20 PROCEDURE — 99999 PR PBB SHADOW E&M-EST. PATIENT-LVL III: CPT | Mod: PBBFAC,,, | Performed by: PAIN MEDICINE

## 2022-07-20 PROCEDURE — 3074F SYST BP LT 130 MM HG: CPT | Mod: CPTII,S$GLB,, | Performed by: PAIN MEDICINE

## 2022-07-20 PROCEDURE — 1159F MED LIST DOCD IN RCRD: CPT | Mod: CPTII,S$GLB,, | Performed by: PAIN MEDICINE

## 2022-07-20 NOTE — PROGRESS NOTES
[facility]    Referred by: No ref. provider found   Reason for referral: * No diagnoses found *     CC:   Chief Complaint   Patient presents with    Low-back Pain       Interval Update:  7/20/2022 - Ms. Edmond is following up for chronic low back pain. Pain is currently rate 10/10 with a weekly range 10-10/10.  It is described as Aching, Tingling and Numb.   Pain is worsened by nothing in particular and improved by rest.  Her last session of PT was Monday (7/18/22) and reports doing better with it.  She is also walking the pool for exercise with reduced pressure on the low back.  She also saw the bariatric MD and is working with her as well.    Subjective:   Carol Edmond is a 37 y.o. female who has a past medical history of Chronic back pain and H/O gastric sleeve. She complains of pain as described below. Her pain started in 2017 and was localized to her lower back with rare radiating pain into her legs. She was treated with chronic Norco for the pain with moderate relief. She had been evaluated by Pain Medicine in [place], where she had 3 rounds of L3/4 AGNES which gave moderate relief for 2-3 months on the first dose and minimal relief after the 2nd and 3rd injections. She then was evaluated by surgery who recommended lumbar fusion, which she had completed 03/2022. After the procedure, her pain was the same and she began to have worsening radicular pains daily.    She is currently completing PT in [place] for her back. She endorses significant weight re-gain after her procedure this Spring. Her primary Care physician recommended seeing a Pain Medicine physician because her pain had become too complex for their comfort level to address.    Location: mid low back   Onset: 2017  Radiation: bilateral leg pain  Timing: constant  Current Pain Score: 10/10  Weekly Pain Range: 9-10/10  Quality: Sharp and Shooting  Worsened by: sitting and standing  Improved by: medications and lying  down     Previous Therapies:  PT/OT: Currently in PT - last session was on Monday 7/18/22  HEP: Minimal, worsening pain since surgery has limited her ability to continue previous HEP  TENS: None  Interventions: L3/4 AGNES x3 with decreasing relief, last gave minimal relief  Surgery: L5/S1 fusion  Opioids: Previously taking Norco, recently prescribed Percocet by ED. Both give moderate relief  Adjuvants: Zanaflex 4mg TID PRN, Cymbalta 90 mg daily, Lyrica 150 mg BID    Current Pain Medications:  1. Percocet  mg TID PRN  2. Zanaflex 4 mg PRN  3. Cymbalta 90 mg daily  4. Lyrica 150 mg twice daily    Assessment & Plan:  Problem List Items Addressed This Visit     Lumbar radiculopathy - Primary    Status post lumbar spinal fusion        7/6/22 -Carol Edmond is a 37 y.o. female who  has a past medical history of Chronic back pain and H/O gastric sleeve.  By history and examination this patient has chronic low back pain with radiculopathy s/p surgical decompression.  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  There may be scar tissue development which would require MRI with contrast to identify.  We discussed the underlying diagnoses and multiple treatment options including non-opioid medications, opioid medications and interventional procedures.  EMG will be very helpful with diagnosing the problem.  The risks and benefits of each treatment option were discussed and all questions were answered.      The addictive potential of the medications were discussed and Ms Edmond agree to inform us or the PCP if any compulsion to taking his medications for any reason other than for pain arises. The patient agrees to safe-guard all medications from unintentional or intentional use or misuse by other persons who may potentially have access to their premises, including but not limited to significant others, children, parents,  friends, and even strangers. The patient takes sole responsibility for any consequences that may occur from not doing so.    7/20/22 - patient was initially scheduled to follow-up with me in approximately 2 weeks, but came to see me 2 weeks early to request an increase in opioid medications stating that her current opioid medications are not effective throughout the day.  Patient was advised that my primary recommended treatment modality for her as physical therapy and Lyrica.  Opioid medications should only be used for severe breakthrough pain and should not be taken around the clock.  Further, the patient was advised that I am not prepared to escalate opioid therapy especially given that she was recently evaluated by Dr. Hdz of neuro surgery who assessed her spinal hardware to be appropriately placed.  She does have a psychiatric history which increases the risk of perseveration on pain and catastrophizing.  I remain concerned about the fact that she is driving from [place] which seems a bit excessive for finding care as she is passing by numerous providers between here and there who could provider her care without the inconvenience of driving 2+ hrs each way.    1. EMG on hold for now 2/2 to recs from Dr. Hdz  2. Cont Lyrica 150 mg PO TID PRN  3. Cont Tizanidine 4 mg   4. Oxycodone  mg TID PRN   5. Cont Duloxetine 90 mg  6. Stay in PT and continue full course    Follow Up: 2 weeks then every 4 weeks there after    Robert Reynolds Jr, MD  Interventional Pain Medicine / Anesthesiology    Disclaimer: This note was partly generated using dictation software which may occasionally result in transcription errors.    Imaging:  CT LUMBAR SPINE WITHOUT CONTRAST 07/03/2022  COMPARISON:  Lumbar spine CT 06/13/2022     FINDINGS:  Number of lumbar vertebral bodies: 5.     Alignment: Normal lordosis. No scoliosis.     Soft tissues: No abnormalities.     Sacroiliac joints: Normal.     Vertebrae:     No  fractures, infection or neoplasm.     Status post interbody fusion at L5-S1.     Degenerative changes:     No significant canal or foraminal narrowing.     Impression:     No fractures.     No significant interval change from 06/13/2022.      MRI W W/o Contrast L Spine 04/2022  FINDINGS:  For the purpose of this report, the most inferior well  developed intervertebral disc space is presumed to represent L5-S1.  There are operative changes with L5-S1 disc spacer which generates  ferromagnetic artifacts causing limited assessment of the L5-S1  operative level.  Lumbar vertebrae stature and alignment is  maintained. The visualized thoracic cord is unremarkable. The conus  medullaris terminates at L1.  Disc segmental analysis is given below:     At L1-L2, disc height is preserved. Central canal is not stenosed.  There are no narrowings of the neuroforamen.     At L2-L3, disc is unremarkable. Central canal is not stenosed. There  are no narrowings of the neuroforamen.     At L3-L4, there is disc bulge with central annular fissure which  flattens and mildly compress the ventral thecal sac. There is  ligamentum flavum thickening and facets arthropathy. These findings  combine to cause mild to moderate central canal stenosis. There are no  narrowings of the neuroforamen.     At L4-L5, there is disc bulge which flattens and mildly compress the  ventral thecal sac. There is right paracentral associated annular  fissure. There is also ligamentum flavum thickening and facets  arthropathy. These findings result in mild to moderate central canal  stenosis.     At L5-S1, there are postoperative changes of disc spacer which cause  considerable ferromagnetic artifacts. Precontrast T1-weighted and  postcontrast T1-weighted images are nondiagnostic. Consequently,  possibility of epidural inflammation or collection is difficult to  assess. On the axial T2-weighted images there is a decompression  laminectomy defect. There is no  significant central canal stenosis.  Limited assessment of the neuroforamen due to artifacts could     IMPRESSION:     Lumbar operative changes, degenerative disc disease and spondylosis  level by level discussed above.    Review of Systems:  Review of Systems   Constitutional: Negative for chills, fever and weight loss.   Respiratory: Negative for cough and shortness of breath.    Cardiovascular: Negative for chest pain, palpitations and leg swelling.   Gastrointestinal: Negative for abdominal pain, nausea and vomiting.   Genitourinary: Negative for dysuria and frequency.   Musculoskeletal: Positive for back pain. Negative for myalgias.   Neurological: Negative for dizziness, tingling and weakness.   Psychiatric/Behavioral: Negative for depression. The patient is not nervous/anxious.        Physical Exam:  Vitals:    07/20/22 1334   BP: 114/77   Pulse: 97   Weight: 111 kg (244 lb 11.4 oz)   PainSc: 10-Worst pain ever     General    Nursing note and vitals reviewed.  Constitutional: She is oriented to person, place, and time. She appears well-developed and well-nourished. No distress.   HENT:   Head: Normocephalic and atraumatic.   Nose: Nose normal.   Eyes: Conjunctivae and EOM are normal. Pupils are equal, round, and reactive to light. Right eye exhibits no discharge. Left eye exhibits no discharge. No scleral icterus.   Neck: No JVD present.   Cardiovascular: Intact distal pulses.    Pulmonary/Chest: Effort normal. No respiratory distress.   Abdominal: She exhibits no distension.   Neurological: She is alert and oriented to person, place, and time. Coordination normal.   Psychiatric: She has a normal mood and affect. Her behavior is normal. Judgment and thought content normal.     General Musculoskeletal Exam   Gait: normal     Back (L-Spine & T-Spine) / Neck (C-Spine) Exam     Tenderness Right paramedian tenderness of the Lower L-Spine. Left paramedian tenderness of the Lower L-Spine.     Back (L-Spine &  T-Spine) Range of Motion   Back extension: facet loading is positive and exacerabtes/reproduces the patient's typical low back pain    Back flexion: limited ROM but partial relief of low back pain noted.     Spinal Sensation   Right Side Sensation  L-Spine Level: normal  Left Side Sensation  L-Spine Level: normal    Other She has no scoliosis .      Muscle Strength   Right Lower Extremity   Hip Flexion: 5/5   Hip Extensors: 5/5  Quadriceps:  5/5   Hamstrin/5   Gastrocsoleus:  5/5   Left Lower Extremity   Hip Flexion: 5/5   Hip Extensors: 5/5  Quadriceps:  5/5   Hamstrin/5   Gastrocsoleus:  5/5     Reflexes     Left Side  Achilles:  2+  Quadriceps:  2+    Right Side   Achilles:  2+  Quadriceps:  2+

## 2022-07-20 NOTE — H&P (VIEW-ONLY)
[facility]    Referred by: No ref. provider found   Reason for referral: * No diagnoses found *     CC:   Chief Complaint   Patient presents with    Low-back Pain       Interval Update:  7/20/2022 - Ms. Edmond is following up for chronic low back pain. Pain is currently rate 10/10 with a weekly range 10-10/10.  It is described as Aching, Tingling and Numb.   Pain is worsened by nothing in particular and improved by rest.  Her last session of PT was Monday (7/18/22) and reports doing better with it.  She is also walking the pool for exercise with reduced pressure on the low back.  She also saw the bariatric MD and is working with her as well.    Subjective:   Carol Edmond is a 37 y.o. female who has a past medical history of Chronic back pain and H/O gastric sleeve. She complains of pain as described below. Her pain started in 2017 and was localized to her lower back with rare radiating pain into her legs. She was treated with chronic Norco for the pain with moderate relief. She had been evaluated by Pain Medicine in [place], where she had 3 rounds of L3/4 AGNES which gave moderate relief for 2-3 months on the first dose and minimal relief after the 2nd and 3rd injections. She then was evaluated by surgery who recommended lumbar fusion, which she had completed 03/2022. After the procedure, her pain was the same and she began to have worsening radicular pains daily.    She is currently completing PT in [place] for her back. She endorses significant weight re-gain after her procedure this Spring. Her primary Care physician recommended seeing a Pain Medicine physician because her pain had become too complex for their comfort level to address.    Location: mid low back   Onset: 2017  Radiation: bilateral leg pain  Timing: constant  Current Pain Score: 10/10  Weekly Pain Range: 9-10/10  Quality: Sharp and Shooting  Worsened by: sitting and standing  Improved by: medications and lying  down     Previous Therapies:  PT/OT: Currently in PT - last session was on Monday 7/18/22  HEP: Minimal, worsening pain since surgery has limited her ability to continue previous HEP  TENS: None  Interventions: L3/4 AGNES x3 with decreasing relief, last gave minimal relief  Surgery: L5/S1 fusion  Opioids: Previously taking Norco, recently prescribed Percocet by ED. Both give moderate relief  Adjuvants: Zanaflex 4mg TID PRN, Cymbalta 90 mg daily, Lyrica 150 mg BID    Current Pain Medications:  1. Percocet  mg TID PRN  2. Zanaflex 4 mg PRN  3. Cymbalta 90 mg daily  4. Lyrica 150 mg twice daily    Assessment & Plan:  Problem List Items Addressed This Visit     Lumbar radiculopathy - Primary    Status post lumbar spinal fusion        7/6/22 -Carol Edmond is a 37 y.o. female who  has a past medical history of Chronic back pain and H/O gastric sleeve.  By history and examination this patient has chronic low back pain with radiculopathy s/p surgical decompression.  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  There may be scar tissue development which would require MRI with contrast to identify.  We discussed the underlying diagnoses and multiple treatment options including non-opioid medications, opioid medications and interventional procedures.  EMG will be very helpful with diagnosing the problem.  The risks and benefits of each treatment option were discussed and all questions were answered.      The addictive potential of the medications were discussed and Ms Edmond agree to inform us or the PCP if any compulsion to taking his medications for any reason other than for pain arises. The patient agrees to safe-guard all medications from unintentional or intentional use or misuse by other persons who may potentially have access to their premises, including but not limited to significant others, children, parents,  friends, and even strangers. The patient takes sole responsibility for any consequences that may occur from not doing so.    7/20/22 - patient was initially scheduled to follow-up with me in approximately 2 weeks, but came to see me 2 weeks early to request an increase in opioid medications stating that her current opioid medications are not effective throughout the day.  Patient was advised that my primary recommended treatment modality for her as physical therapy and Lyrica.  Opioid medications should only be used for severe breakthrough pain and should not be taken around the clock.  Further, the patient was advised that I am not prepared to escalate opioid therapy especially given that she was recently evaluated by Dr. Hdz of neuro surgery who assessed her spinal hardware to be appropriately placed.  She does have a psychiatric history which increases the risk of perseveration on pain and catastrophizing.  I remain concerned about the fact that she is driving from [place] which seems a bit excessive for finding care as she is passing by numerous providers between here and there who could provider her care without the inconvenience of driving 2+ hrs each way.    1. EMG on hold for now 2/2 to recs from Dr. Hdz  2. Cont Lyrica 150 mg PO TID PRN  3. Cont Tizanidine 4 mg   4. Oxycodone  mg TID PRN   5. Cont Duloxetine 90 mg  6. Stay in PT and continue full course    Follow Up: 2 weeks then every 4 weeks there after    Robert Reynolds Jr, MD  Interventional Pain Medicine / Anesthesiology    Disclaimer: This note was partly generated using dictation software which may occasionally result in transcription errors.    Imaging:  CT LUMBAR SPINE WITHOUT CONTRAST 07/03/2022  COMPARISON:  Lumbar spine CT 06/13/2022     FINDINGS:  Number of lumbar vertebral bodies: 5.     Alignment: Normal lordosis. No scoliosis.     Soft tissues: No abnormalities.     Sacroiliac joints: Normal.     Vertebrae:     No  fractures, infection or neoplasm.     Status post interbody fusion at L5-S1.     Degenerative changes:     No significant canal or foraminal narrowing.     Impression:     No fractures.     No significant interval change from 06/13/2022.      MRI W W/o Contrast L Spine 04/2022  FINDINGS:  For the purpose of this report, the most inferior well  developed intervertebral disc space is presumed to represent L5-S1.  There are operative changes with L5-S1 disc spacer which generates  ferromagnetic artifacts causing limited assessment of the L5-S1  operative level.  Lumbar vertebrae stature and alignment is  maintained. The visualized thoracic cord is unremarkable. The conus  medullaris terminates at L1.  Disc segmental analysis is given below:     At L1-L2, disc height is preserved. Central canal is not stenosed.  There are no narrowings of the neuroforamen.     At L2-L3, disc is unremarkable. Central canal is not stenosed. There  are no narrowings of the neuroforamen.     At L3-L4, there is disc bulge with central annular fissure which  flattens and mildly compress the ventral thecal sac. There is  ligamentum flavum thickening and facets arthropathy. These findings  combine to cause mild to moderate central canal stenosis. There are no  narrowings of the neuroforamen.     At L4-L5, there is disc bulge which flattens and mildly compress the  ventral thecal sac. There is right paracentral associated annular  fissure. There is also ligamentum flavum thickening and facets  arthropathy. These findings result in mild to moderate central canal  stenosis.     At L5-S1, there are postoperative changes of disc spacer which cause  considerable ferromagnetic artifacts. Precontrast T1-weighted and  postcontrast T1-weighted images are nondiagnostic. Consequently,  possibility of epidural inflammation or collection is difficult to  assess. On the axial T2-weighted images there is a decompression  laminectomy defect. There is no  significant central canal stenosis.  Limited assessment of the neuroforamen due to artifacts could     IMPRESSION:     Lumbar operative changes, degenerative disc disease and spondylosis  level by level discussed above.    Review of Systems:  Review of Systems   Constitutional: Negative for chills, fever and weight loss.   Respiratory: Negative for cough and shortness of breath.    Cardiovascular: Negative for chest pain, palpitations and leg swelling.   Gastrointestinal: Negative for abdominal pain, nausea and vomiting.   Genitourinary: Negative for dysuria and frequency.   Musculoskeletal: Positive for back pain. Negative for myalgias.   Neurological: Negative for dizziness, tingling and weakness.   Psychiatric/Behavioral: Negative for depression. The patient is not nervous/anxious.        Physical Exam:  Vitals:    07/20/22 1334   BP: 114/77   Pulse: 97   Weight: 111 kg (244 lb 11.4 oz)   PainSc: 10-Worst pain ever     General    Nursing note and vitals reviewed.  Constitutional: She is oriented to person, place, and time. She appears well-developed and well-nourished. No distress.   HENT:   Head: Normocephalic and atraumatic.   Nose: Nose normal.   Eyes: Conjunctivae and EOM are normal. Pupils are equal, round, and reactive to light. Right eye exhibits no discharge. Left eye exhibits no discharge. No scleral icterus.   Neck: No JVD present.   Cardiovascular: Intact distal pulses.    Pulmonary/Chest: Effort normal. No respiratory distress.   Abdominal: She exhibits no distension.   Neurological: She is alert and oriented to person, place, and time. Coordination normal.   Psychiatric: She has a normal mood and affect. Her behavior is normal. Judgment and thought content normal.     General Musculoskeletal Exam   Gait: normal     Back (L-Spine & T-Spine) / Neck (C-Spine) Exam     Tenderness Right paramedian tenderness of the Lower L-Spine. Left paramedian tenderness of the Lower L-Spine.     Back (L-Spine &  T-Spine) Range of Motion   Back extension: facet loading is positive and exacerabtes/reproduces the patient's typical low back pain    Back flexion: limited ROM but partial relief of low back pain noted.     Spinal Sensation   Right Side Sensation  L-Spine Level: normal  Left Side Sensation  L-Spine Level: normal    Other She has no scoliosis .      Muscle Strength   Right Lower Extremity   Hip Flexion: 5/5   Hip Extensors: 5/5  Quadriceps:  5/5   Hamstrin/5   Gastrocsoleus:  5/5   Left Lower Extremity   Hip Flexion: 5/5   Hip Extensors: 5/5  Quadriceps:  5/5   Hamstrin/5   Gastrocsoleus:  5/5     Reflexes     Left Side  Achilles:  2+  Quadriceps:  2+    Right Side   Achilles:  2+  Quadriceps:  2+

## 2022-07-20 NOTE — PROGRESS NOTES
[facility]    Referred by: No ref. provider found   Reason for referral: * No diagnoses found *     CC: Bilateral low back pain with radiation into both legs    7/20/2022 - Ms. Edmond is following up for chronic low back pain. Pain is currently rate 10/10 with a weekly range 10-10/10.  It is described as Aching, Tingling and Numb.   Pain is worsened by nothing in particular and improved by rest.    Subjective:   Carol Edmond is a 37 y.o. female who has a past medical history of Chronic back pain and H/O gastric sleeve. She complains of pain as described below. Her pain started in 2017 and was localized to her lower back with rare radiating pain into her legs. She was treated with chronic Norco for the pain with moderate relief. She had been evaluated by Pain Medicine in [place], where she had 3 rounds of L3/4 AGNES which gave moderate relief for 2-3 months on the first dose and minimal relief after the 2nd and 3rd injections. She then was evaluated by surgery who recommended lumbar fusion, which she had completed 03/2022. After the procedure, her pain was the same and she began to have worsening radicular pains daily.    She is currently completing PT in [place] for her back. She endorses significant weight re-gain after her procedure this Spring. Her primary Care physician recommended seeing a Pain Medicine physician because her pain had become too complex for their comfort level to address.    Location: mid low back   Onset: 2017  Radiation: bilateral leg pain  Timing: constant  Current Pain Score: 10/10  Weekly Pain Range: 9-10/10  Quality: Sharp and Shooting  Worsened by: sitting and standing  Improved by: medications and lying down     Previous Therapies:  PT/OT: Currently in PT  HEP: Minimal, worsening pain since surgery has limited her ability to continue previous HEP  TENS: None  Interventions: L3/4 AGNES x3 with decreasing relief, last gave minimal relief  Surgery: L5/S1  fusion  Opioids: Previously taking Norco, recently prescribed Percocet by ED. Both give moderate relief  Adjuvants: Zanaflex 4mg TID PRN, Cymbalta 90 mg daily, Lyrica 150 mg BID    Current Pain Medications:  1. Percocet  mg PRN  2. Zanaflex 4 mg PRN  3. Cymbalta 90 mg daily  4. Lyrica 150 mg twice daily    Assessment & Plan:  Problem List Items Addressed This Visit    None             1. We have discussed ***      Follow Up:     Disclaimer: This note was partly generated using dictation software which may occasionally result in transcription errors.    [email or web address]  CT LUMBAR SPINE WITHOUT CONTRAST     CLINICAL HISTORY:  Lumbar radiculopathy, trauma;     TECHNIQUE:  Axial scans were obtained through the lumbar spine.     Coronal and sagittal reconstructions obtained from the axial data.     Automatic exposure control was utilized to limit radiation dose.     Contrast: None     Radiation Dose:     Total DLP: 1120 mGy*cm     COMPARISON:  Lumbar spine CT 2022     FINDINGS:  Number of lumbar vertebral bodies: 5.     Alignment: Normal lordosis. No scoliosis.     Soft tissues: No abnormalities.     Sacroiliac joints: Normal.     Vertebrae:     No fractures, infection or neoplasm.     Status post interbody fusion at L5-S1.     Degenerative changes:     No significant canal or foraminal narrowing.     Impression:     No fractures.     No significant interval change from 2022.      2022  MRI W W/o Contrast L Spine  FINDINGS:  For the purpose of this report, the most inferior well  developed intervertebral disc space is presumed to represent L5-S1.  There are operative changes with L5-S1 disc spacer which generates  ferromagnetic artifacts causing limited assessment of the L5-S1  operative level.  Lumbar vertebrae stature and alignment is  maintained. The visualized thoracic cord is unremarkable. The conus  medullaris terminates at L1.  Disc segmental analysis is given below:     At L1-L2, disc  height is preserved. Central canal is not stenosed.  There are no narrowings of the neuroforamen.     At L2-L3, disc is unremarkable. Central canal is not stenosed. There  are no narrowings of the neuroforamen.     At L3-L4, there is disc bulge with central annular fissure which  flattens and mildly compress the ventral thecal sac. There is  ligamentum flavum thickening and facets arthropathy. These findings  combine to cause mild to moderate central canal stenosis. There are no  narrowings of the neuroforamen.     At L4-L5, there is disc bulge which flattens and mildly compress the  ventral thecal sac. There is right paracentral associated annular  fissure. There is also ligamentum flavum thickening and facets  arthropathy. These findings result in mild to moderate central canal  stenosis.     At L5-S1, there are postoperative changes of disc spacer which cause  considerable ferromagnetic artifacts. Precontrast T1-weighted and  postcontrast T1-weighted images are nondiagnostic. Consequently,  possibility of epidural inflammation or collection is difficult to  assess. On the axial T2-weighted images there is a decompression  laminectomy defect. There is no significant central canal stenosis.  Limited assessment of the neuroforamen due to artifacts could     IMPRESSION:     Lumbar operative changes, degenerative disc disease and spondylosis  level by level discussed above.    Review of Systems:  Review of Systems   Constitutional: Negative for chills, fever and weight loss.   Respiratory: Negative for cough and shortness of breath.    Cardiovascular: Negative for chest pain, palpitations and leg swelling.   Gastrointestinal: Negative for abdominal pain, nausea and vomiting.   Genitourinary: Negative for dysuria and frequency.   Musculoskeletal: Positive for back pain. Negative for myalgias.   Neurological: Negative for dizziness, tingling and weakness.   Psychiatric/Behavioral: Negative for depression. The patient is  not nervous/anxious.        Physical Exam:  There were no vitals filed for this visit.  Ortho/SPM Exam

## 2022-07-22 ENCOUNTER — TELEPHONE (OUTPATIENT)
Dept: PAIN MEDICINE | Facility: CLINIC | Age: 37
End: 2022-07-22
Payer: COMMERCIAL

## 2022-07-22 ENCOUNTER — TELEPHONE (OUTPATIENT)
Dept: PHYSICAL MEDICINE AND REHAB | Facility: CLINIC | Age: 37
End: 2022-07-22
Payer: COMMERCIAL

## 2022-07-22 NOTE — TELEPHONE ENCOUNTER
----- Message from Libby Rider MA sent at 7/22/2022 10:23 AM CDT -----  Regarding: schedule EMG  Can someone please schedule this pt for a EMG. Her order is in the system.     Thanks,  AR

## 2022-07-24 ENCOUNTER — PATIENT MESSAGE (OUTPATIENT)
Dept: PAIN MEDICINE | Facility: CLINIC | Age: 37
End: 2022-07-24
Payer: COMMERCIAL

## 2022-07-24 ENCOUNTER — HOSPITAL ENCOUNTER (EMERGENCY)
Facility: HOSPITAL | Age: 37
Discharge: HOME | End: 2022-07-24
Attending: EMERGENCY MEDICINE
Payer: COMMERCIAL

## 2022-07-24 VITALS
HEIGHT: 64 IN | DIASTOLIC BLOOD PRESSURE: 69 MMHG | BODY MASS INDEX: 41.4 KG/M2 | TEMPERATURE: 99 F | OXYGEN SATURATION: 98 % | HEART RATE: 91 BPM | WEIGHT: 242.5 LBS | RESPIRATION RATE: 18 BRPM | SYSTOLIC BLOOD PRESSURE: 98 MMHG
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
## 2022-07-24 DIAGNOSIS — M54.40 CHRONIC MIDLINE LOW BACK PAIN WITH SCIATICA, SCIATICA LATERALITY UNSPECIFIED: Primary | ICD-10-CM

## 2022-07-24 DIAGNOSIS — R07.9 CHEST PAIN: ICD-10-CM

## 2022-07-24 DIAGNOSIS — G89.29 CHRONIC MIDLINE LOW BACK PAIN WITH SCIATICA, SCIATICA LATERALITY UNSPECIFIED: Primary | ICD-10-CM

## 2022-07-24 LAB
ALBUMIN SERPL-MCNC: 3.6 GM/DL (ref 3.5–5)
ALBUMIN/GLOB SERPL: 1.2 RATIO (ref 1.1–2)
ALP SERPL-CCNC: 117 UNIT/L (ref 40–150)
ALT SERPL-CCNC: 14 UNIT/L (ref 0–55)
AMPHET UR QL SCN: NEGATIVE
APPEARANCE UR: CLEAR
AST SERPL-CCNC: 11 UNIT/L (ref 5–34)
B-HCG SERPL QL: NEGATIVE
BARBITURATE SCN PRESENT UR: NEGATIVE
BASOPHILS # BLD AUTO: 0.05 X10(3)/MCL (ref 0–0.2)
BASOPHILS NFR BLD AUTO: 0.8 %
BENZODIAZ UR QL SCN: POSITIVE
BILIRUB UR QL STRIP.AUTO: NEGATIVE MG/DL
BILIRUBIN DIRECT+TOT PNL SERPL-MCNC: 0.3 MG/DL
BUN SERPL-MCNC: 10.8 MG/DL (ref 7–18.7)
CALCIUM SERPL-MCNC: 9 MG/DL (ref 8.4–10.2)
CANNABINOIDS UR QL SCN: POSITIVE
CHLORIDE SERPL-SCNC: 104 MMOL/L (ref 98–107)
CO2 SERPL-SCNC: 29 MMOL/L (ref 22–29)
COCAINE UR QL SCN: NEGATIVE
COLOR UR AUTO: YELLOW
CREAT SERPL-MCNC: 0.62 MG/DL (ref 0.55–1.02)
EOSINOPHIL # BLD AUTO: 0.02 X10(3)/MCL (ref 0–0.9)
EOSINOPHIL NFR BLD AUTO: 0.3 %
ERYTHROCYTE [DISTWIDTH] IN BLOOD BY AUTOMATED COUNT: 14.8 % (ref 11.5–17)
GLOBULIN SER-MCNC: 3 GM/DL (ref 2.4–3.5)
GLUCOSE SERPL-MCNC: 85 MG/DL (ref 74–100)
GLUCOSE UR QL STRIP.AUTO: NEGATIVE MG/DL
HCT VFR BLD AUTO: 31.6 % (ref 37–47)
HGB BLD-MCNC: 9.9 GM/DL (ref 12–16)
IMM GRANULOCYTES # BLD AUTO: 0.01 X10(3)/MCL (ref 0–0.04)
IMM GRANULOCYTES NFR BLD AUTO: 0.2 %
KETONES UR QL STRIP.AUTO: NEGATIVE MG/DL
LEUKOCYTE ESTERASE UR QL STRIP.AUTO: NEGATIVE UNIT/L
LIPASE SERPL-CCNC: 33 U/L
LYMPHOCYTES # BLD AUTO: 2.01 X10(3)/MCL (ref 0.6–4.6)
LYMPHOCYTES NFR BLD AUTO: 33 %
MCH RBC QN AUTO: 28.7 PG (ref 27–31)
MCHC RBC AUTO-ENTMCNC: 31.3 MG/DL (ref 33–36)
MCV RBC AUTO: 91.6 FL (ref 80–94)
MDMA UR QL SCN: NEGATIVE
MONOCYTES # BLD AUTO: 0.43 X10(3)/MCL (ref 0.1–1.3)
MONOCYTES NFR BLD AUTO: 7.1 %
NEUTROPHILS # BLD AUTO: 3.6 X10(3)/MCL (ref 2.1–9.2)
NEUTROPHILS NFR BLD AUTO: 58.6 %
NITRITE UR QL STRIP.AUTO: NEGATIVE
NRBC BLD AUTO-RTO: 0 %
OPIATES UR QL SCN: POSITIVE
PCP UR QL: NEGATIVE
PH UR STRIP.AUTO: 5.5 [PH]
PH UR: 5.5 [PH] (ref 3–11)
PLATELET # BLD AUTO: 303 X10(3)/MCL (ref 130–400)
PMV BLD AUTO: 9.8 FL (ref 7.4–10.4)
POTASSIUM SERPL-SCNC: 4.3 MMOL/L (ref 3.5–5.1)
PROT SERPL-MCNC: 6.6 GM/DL (ref 6.4–8.3)
PROT UR QL STRIP.AUTO: NEGATIVE MG/DL
RBC # BLD AUTO: 3.45 X10(6)/MCL (ref 4.2–5.4)
RBC UR QL AUTO: NEGATIVE UNIT/L
SODIUM SERPL-SCNC: 141 MMOL/L (ref 136–145)
SP GR UR STRIP.AUTO: >=1.03
SPECIFIC GRAVITY, URINE AUTO (.000) (OHS): >=1.03 (ref 1–1.03)
TROPONIN I SERPL-MCNC: 0.01 NG/ML (ref 0–0.04)
UROBILINOGEN UR STRIP-ACNC: 0.2 MG/DL
WBC # SPEC AUTO: 6.1 X10(3)/MCL (ref 4.5–11.5)

## 2022-07-24 PROCEDURE — 96374 THER/PROPH/DIAG INJ IV PUSH: CPT

## 2022-07-24 PROCEDURE — 96375 TX/PRO/DX INJ NEW DRUG ADDON: CPT

## 2022-07-24 PROCEDURE — 83690 ASSAY OF LIPASE: CPT | Performed by: EMERGENCY MEDICINE

## 2022-07-24 PROCEDURE — 90715 TDAP VACCINE 7 YRS/> IM: CPT | Performed by: EMERGENCY MEDICINE

## 2022-07-24 PROCEDURE — 85025 COMPLETE CBC W/AUTO DIFF WBC: CPT | Performed by: EMERGENCY MEDICINE

## 2022-07-24 PROCEDURE — 81003 URINALYSIS AUTO W/O SCOPE: CPT | Mod: 59 | Performed by: EMERGENCY MEDICINE

## 2022-07-24 PROCEDURE — 93010 EKG 12-LEAD: ICD-10-PCS | Mod: ,,, | Performed by: INTERNAL MEDICINE

## 2022-07-24 PROCEDURE — 99285 EMERGENCY DEPT VISIT HI MDM: CPT | Mod: 25

## 2022-07-24 PROCEDURE — 93005 ELECTROCARDIOGRAM TRACING: CPT

## 2022-07-24 PROCEDURE — 80307 DRUG TEST PRSMV CHEM ANLYZR: CPT | Performed by: EMERGENCY MEDICINE

## 2022-07-24 PROCEDURE — 93010 ELECTROCARDIOGRAM REPORT: CPT | Mod: ,,, | Performed by: INTERNAL MEDICINE

## 2022-07-24 PROCEDURE — 90471 IMMUNIZATION ADMIN: CPT | Performed by: EMERGENCY MEDICINE

## 2022-07-24 PROCEDURE — 80053 COMPREHEN METABOLIC PANEL: CPT | Performed by: EMERGENCY MEDICINE

## 2022-07-24 PROCEDURE — 63600175 PHARM REV CODE 636 W HCPCS: Performed by: EMERGENCY MEDICINE

## 2022-07-24 PROCEDURE — 36415 COLL VENOUS BLD VENIPUNCTURE: CPT | Performed by: EMERGENCY MEDICINE

## 2022-07-24 PROCEDURE — 84484 ASSAY OF TROPONIN QUANT: CPT | Performed by: EMERGENCY MEDICINE

## 2022-07-24 PROCEDURE — 81025 URINE PREGNANCY TEST: CPT | Performed by: EMERGENCY MEDICINE

## 2022-07-24 RX ORDER — MORPHINE SULFATE 4 MG/ML
4 INJECTION, SOLUTION INTRAMUSCULAR; INTRAVENOUS
Status: COMPLETED | OUTPATIENT
Start: 2022-07-24 | End: 2022-07-24

## 2022-07-24 RX ORDER — AMOXICILLIN AND CLAVULANATE POTASSIUM 875; 125 MG/1; MG/1
1 TABLET, FILM COATED ORAL 2 TIMES DAILY
Qty: 14 TABLET | Refills: 0 | Status: SHIPPED | OUTPATIENT
Start: 2022-07-24 | End: 2022-08-02

## 2022-07-24 RX ORDER — ONDANSETRON 2 MG/ML
4 INJECTION INTRAMUSCULAR; INTRAVENOUS
Status: COMPLETED | OUTPATIENT
Start: 2022-07-24 | End: 2022-07-24

## 2022-07-24 RX ADMIN — TETANUS TOXOID, REDUCED DIPHTHERIA TOXOID AND ACELLULAR PERTUSSIS VACCINE, ADSORBED 0.5 ML: 5; 2.5; 8; 8; 2.5 SUSPENSION INTRAMUSCULAR at 11:07

## 2022-07-24 RX ADMIN — ONDANSETRON 4 MG: 2 INJECTION INTRAMUSCULAR; INTRAVENOUS at 11:07

## 2022-07-24 RX ADMIN — MORPHINE SULFATE 4 MG: 4 INJECTION, SOLUTION INTRAMUSCULAR; INTRAVENOUS at 11:07

## 2022-07-24 NOTE — ED PROVIDER NOTES
Encounter Date: 7/24/2022       History     Chief Complaint   Patient presents with    Chest Pain     Cp and sob today     Patient presents with c/o chest pain,shortness of breath, chronic back pain, and dog bite/scratches. Approx 4 am this morning her dogs got into a fight and she tried to separate them. She was scratched and possibly bit on the left hand and has some pain and bruising. She is uncertain of her last tetanus shot. She started to feel anxious after getting scratched and she developed the chest pain and shortness of breath. When it persisted, she decided to come in to be evaluated. Patient states her wife drove her here. Chest pain is anterior, described as a tightness. No cardiac hx or underlying lung disease. Prior to this she was in her normal state of health and denies fevers, cough, leg swelling, or rashes.      Her back pain is lower, severe, 10/10 with radiation down her legs. No new falls or accident. Had surgery  approx 5 months ago with Dr. Urena without any complication Back pain is chronic and she sees pain management Dr. Barrera who she started seeing this month. She prior to this had multiple ED visits for same complaints. CT on 7/3 showed no fractures and MRI done in April showed no cauda equina like findings. Currently being treated with lyrica, oxycodone, and duloxetine with plan for EMG. She saw him earlier than her August 1 appointment to request an increase in her narcotic pain medication which he did not do at that time. Today when asked if she has had any urinary symptoms she states she has urinated on herself a few times in the past 3-4 weeks. Otherwise her back complaints are consistent with previous.         Review of patient's allergies indicates:   Allergen Reactions    Nsaids (non-steroidal anti-inflammatory drug)      Hx of bariatric sx     Past Medical History:   Diagnosis Date    Chronic back pain     H/O gastric sleeve      Past Surgical History:   Procedure  Laterality Date    abominal plasty      BACK SURGERY      BARIATRIC SURGERY      gastric sleeve      SPINAL FUSION       History reviewed. No pertinent family history.  Social History     Tobacco Use    Smoking status: Never Smoker    Smokeless tobacco: Never Used   Substance Use Topics    Alcohol use: Not Currently     Review of Systems   All other systems reviewed and are negative.      Physical Exam     Initial Vitals [07/24/22 1032]   BP Pulse Resp Temp SpO2   98/69 91 18 98.6 °F (37 °C) 98 %      MAP       --         Physical Exam    Nursing note and vitals reviewed.  Constitutional: She appears well-developed and well-nourished. No distress.   HENT:   Head: Normocephalic and atraumatic.   Eyes: Conjunctivae are normal. Pupils are equal, round, and reactive to light.   Neck: Neck supple.   Cardiovascular: Normal rate, regular rhythm and normal heart sounds.   No murmur heard.  Pulmonary/Chest: Breath sounds normal. No respiratory distress. She has no wheezes. She has no rhonchi.   Abdominal: Abdomen is soft. Bowel sounds are normal. She exhibits no distension. There is no abdominal tenderness. There is no rebound and no guarding.   Musculoskeletal:      Cervical back: Neck supple.      Comments: Mild swelling to the left hand over the 1st mcp with bruising and abrasions. Appear superficial without clear puncture jennifer.      Neurological: She is alert and oriented to person, place, and time.   Skin: Skin is warm and dry.   Psychiatric: She has a normal mood and affect. Thought content normal.         ED Course   Procedures  Labs Reviewed   CBC WITH DIFFERENTIAL - Abnormal; Notable for the following components:       Result Value    RBC 3.45 (*)     Hgb 9.9 (*)     Hct 31.6 (*)     MCHC 31.3 (*)     All other components within normal limits   DRUG SCREEN, URINE (BEAKER) - Abnormal; Notable for the following components:    Benzodiazepine, Urine Positive (*)     Cannabinoids, Urine Positive (*)     Opiates,  Urine Positive (*)     All other components within normal limits    Narrative:     Cut off concentrations:    Amphetamines - 1000 ng/ml  Barbiturates - 200 ng/ml  Benzodiazepine - 200 ng/ml  Cannabinoids (THC) - 50 ng/ml  Cocaine - 300 ng/ml  Fentanyl - 1.0 ng/ml  MDMA - 500 ng/ml  Opiates - 300 ng/ml   Phencyclidine (PCP) - 25 ng/ml    Specimen submitted for drug analysis and tested for pH and specific gravity in order to evaluate sample integrity. Suspect tampering if specific gravity is <1.003 and/or pH is not within the range of 4.5 - 8.0  False negatives may result form substances such as bleach added to urine.  False positives may result for the presence of a substance with similar chemical structure to the drug or its metabolite.    This test provides only a PRELIMINARY analytical test result. A more specific alternate chemical method must be used in order to obtain a confirmed analytical result. Gas chromatography/mass spectrometry (GC/MS) is the preferred confirmatory method. Other chemical confirmation methods are available. Clinical consideration and professional judgement should be applied to any drug of abuse test result, particularly when preliminary positive results are used.    Positive results will be confirmed only at the physicians request. Unconfirmed screening results are to be used only for medical purposes (treatment).        LIPASE - Normal   TROPONIN I - Normal   PREGNANCY TEST, URINE RAPID - Normal   CBC W/ AUTO DIFFERENTIAL    Narrative:     The following orders were created for panel order CBC auto differential.  Procedure                               Abnormality         Status                     ---------                               -----------         ------                     CBC with Differential[[id number]]        Abnormal            Final result                 Please view results for these tests on the individual orders.   COMPREHENSIVE METABOLIC PANEL   URINALYSIS, REFLEX TO  URINE CULTURE     EKG Readings: (Independently Interpreted)   EKG Interpretation 11:12 AM    Rate: 76  Rhythm: NSR  QRS: WNL  Qtc: WNL  No signs of ischemia           Imaging Results          X-Ray Hand 3 view Left (Final result)  Result time 07/24/22 11:53:21    Final result by Michael Hand MD (07/24/22 11:53:21)                 Impression:      No acute osseous abnormality, fracture, or dislocation.    There is no significant degenerative change.      Electronically signed by: Michael Hand  Date:    07/24/2022  Time:    11:53             Narrative:    EXAMINATION:  XR HAND COMPLETE 3 VIEW LEFT    CLINICAL HISTORY:  dog bite;    TECHNIQUE:  Radiographs of the left hand with AP, lateral and oblique  views.    COMPARISON:  No prior imaging available for comparison    FINDINGS:  There is no acute fracture, subluxation or dislocation.    Joints and interspaces appear maintained.    Osseous structures show normal bone mineral density.    Soft tissues are unremarkable.    There are no radiopaque foreign bodies.                               X-Ray Chest AP Portable (Final result)  Result time 07/24/22 11:53:32    Final result by Michael Hand MD (07/24/22 11:53:32)                 Impression:      No acute cardiopulmonary process.      Electronically signed by: Michael Hand  Date:    07/24/2022  Time:    11:53             Narrative:    EXAMINATION:  XR CHEST AP PORTABLE    TECHNIQUE:  Single view of the chest    COMPARISON:  No prior imaging available for comparison.    FINDINGS:  No focal opacification, pleural effusion, or pneumothorax.    The cardiomediastinal silhouette is within normal limits.    No acute osseous abnormality.                                 Medications   Tdap (BOOSTRIX) vaccine injection 0.5 mL (0.5 mLs Intramuscular Given 7/24/22 1133)   morphine injection 4 mg (4 mg Intravenous Given 7/24/22 1126)   ondansetron injection 4 mg (4 mg Intravenous Given 7/24/22 1126)     Medical Decision  Making:   I have reviewed Dr. Barrera's notes and prior imaging and ED visits. Commutatively, patient seems to have similar presentations for back, always 10/10, minimal relief. Discussed with patient that I could give one dose of pain medication for the chest pain at this time since she has a ride while we are working her up but I would be unable to prescribe any further narcotics. Patient has no new trauma related to the back and Post void residual is 20cc. Recent MRI and CTs did not show cauda equina. I do not have concern for new surgical emergency related to back pain at this time.  I do nothing she needs new imaging at the moment and would be best served to continue follow up with pain management.   Chest pain work up had normal EKG, Normal labs with > 6 hours onset, and normal CXR. For dog scratches, will prescribe augmentin and local wound care.                ED Course as of 07/24/22 1337   Sun Jul 24, 2022   1129 PVR 20cc [GM]      ED Course User Index  [GM] Samantha Peñaloza MD             Clinical Impression:   Final diagnoses:  [R07.9] Chest pain  [M54.40, G89.29] Chronic midline low back pain with sciatica, sciatica laterality unspecified (Primary)  [W54.0XXA] Dog bite, initial encounter          ED Disposition Condition    Discharge Stable        ED Prescriptions     Medication Sig Dispense Start Date End Date Auth. Provider    amoxicillin-clavulanate 875-125mg (AUGMENTIN) 875-125 mg per tablet Take 1 tablet by mouth 2 (two) times daily. 14 tablet 7/24/2022  Samantha Peñaloza MD        Follow-up Information     Follow up With Specialties Details Why Contact Dasia Guzman MD Family Medicine Schedule an appointment as soon as possible for a visit  for follow up and re-evaluation. Keep scheduled appointments with your pain management doctor. [place] [id number]  [phone]             Samantha Peñaloza MD  07/24/22 9149

## 2022-07-27 ENCOUNTER — TELEPHONE (OUTPATIENT)
Dept: PAIN MEDICINE | Facility: CLINIC | Age: 37
End: 2022-07-27
Payer: COMMERCIAL

## 2022-07-27 DIAGNOSIS — M47.816 LUMBAR SPONDYLOSIS: Primary | ICD-10-CM

## 2022-07-27 NOTE — TELEPHONE ENCOUNTER
Patient may be having pain in posterior structures like facet joints.  I have recommended bilateral L4-5 and L5-S1 facet steroid injection.  I will continue to consider alternate causes.    Alternate causes of her pain along with potential treatments are:    1. CRPS / Sympathetic Nerve Injury -> bilateral lumbar sympathetic block @ L2 vs SCS trial  1. Discs are highly innervated with sympathetics  2. Vertebrogenic Pain -> Intracept procedure  3. Allergic reaction to artificial disc -> surgical consult      Robert Reynolds Jr, MD  Interventional Pain Medicine / Anesthesiology

## 2022-07-28 ENCOUNTER — HOSPITAL ENCOUNTER (EMERGENCY)
Dept: RADIOLOGY | Facility: HOSPITAL | Age: 37
Discharge: HOME | End: 2022-07-28
Attending: EMERGENCY MEDICINE
Payer: COMMERCIAL

## 2022-07-28 ENCOUNTER — HOSPITAL ENCOUNTER (OUTPATIENT)
Facility: HOSPITAL | Age: 37
Discharge: HOME | End: 2022-07-29
Attending: EMERGENCY MEDICINE | Admitting: SURGERY
Payer: COMMERCIAL

## 2022-07-28 DIAGNOSIS — M54.9 ACUTE BILATERAL BACK PAIN, UNSPECIFIED BACK LOCATION: ICD-10-CM

## 2022-07-28 DIAGNOSIS — M54.2 NECK PAIN: ICD-10-CM

## 2022-07-28 DIAGNOSIS — S22.49XA RIB FRACTURES: ICD-10-CM
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
## 2022-07-28 DIAGNOSIS — S22.49XA MULTIPLE RIB FRACTURES INVOLVING FOUR OR MORE RIBS: ICD-10-CM

## 2022-07-28 DIAGNOSIS — S00.83XA CONTUSION OF FACE, INITIAL ENCOUNTER: ICD-10-CM

## 2022-07-28 LAB
ALBUMIN SERPL-MCNC: 3.7 GM/DL (ref 3.5–5)
ALBUMIN/GLOB SERPL: 1 RATIO (ref 1.1–2)
ALP SERPL-CCNC: 97 UNIT/L (ref 40–150)
ALT SERPL-CCNC: 46 UNIT/L (ref 0–55)
AST SERPL-CCNC: 87 UNIT/L (ref 5–34)
B-HCG SERPL QL: NEGATIVE
BASOPHILS # BLD AUTO: 0.02 X10(3)/MCL (ref 0–0.2)
BASOPHILS NFR BLD AUTO: 0.3 %
BILIRUBIN DIRECT+TOT PNL SERPL-MCNC: 0.3 MG/DL
BUN SERPL-MCNC: 7.5 MG/DL (ref 7–18.7)
CALCIUM SERPL-MCNC: 9.1 MG/DL (ref 8.4–10.2)
CHLORIDE SERPL-SCNC: 105 MMOL/L (ref 98–107)
CO2 SERPL-SCNC: 22 MMOL/L (ref 22–29)
CREAT SERPL-MCNC: 0.67 MG/DL (ref 0.55–1.02)
EOSINOPHIL # BLD AUTO: 0.02 X10(3)/MCL (ref 0–0.9)
EOSINOPHIL NFR BLD AUTO: 0.3 %
ERYTHROCYTE [DISTWIDTH] IN BLOOD BY AUTOMATED COUNT: 14.4 % (ref 11.5–17)
GLOBULIN SER-MCNC: 3.7 GM/DL (ref 2.4–3.5)
GLUCOSE SERPL-MCNC: 87 MG/DL (ref 74–100)
HCT VFR BLD AUTO: 33.2 % (ref 37–47)
HGB BLD-MCNC: 10.5 GM/DL (ref 12–16)
IMM GRANULOCYTES # BLD AUTO: 0.02 X10(3)/MCL (ref 0–0.04)
IMM GRANULOCYTES NFR BLD AUTO: 0.3 %
LACTATE SERPL-SCNC: 0.9 MMOL/L (ref 0.5–2.2)
LYMPHOCYTES # BLD AUTO: 1.34 X10(3)/MCL (ref 0.6–4.6)
LYMPHOCYTES NFR BLD AUTO: 20.1 %
MCH RBC QN AUTO: 28.5 PG (ref 27–31)
MCHC RBC AUTO-ENTMCNC: 31.6 MG/DL (ref 33–36)
MCV RBC AUTO: 90.2 FL (ref 80–94)
MONOCYTES # BLD AUTO: 0.53 X10(3)/MCL (ref 0.1–1.3)
MONOCYTES NFR BLD AUTO: 7.9 %
NEUTROPHILS # BLD AUTO: 4.7 X10(3)/MCL (ref 2.1–9.2)
NEUTROPHILS NFR BLD AUTO: 71.1 %
NRBC BLD AUTO-RTO: 0 %
PLATELET # BLD AUTO: 230 X10(3)/MCL (ref 130–400)
PMV BLD AUTO: 9.9 FL (ref 7.4–10.4)
POTASSIUM SERPL-SCNC: 4.5 MMOL/L (ref 3.5–5.1)
PROT SERPL-MCNC: 7.4 GM/DL (ref 6.4–8.3)
RBC # BLD AUTO: 3.68 X10(6)/MCL (ref 4.2–5.4)
SODIUM SERPL-SCNC: 138 MMOL/L (ref 136–145)
WBC # SPEC AUTO: 6.7 X10(3)/MCL (ref 4.5–11.5)

## 2022-07-28 PROCEDURE — 36415 COLL VENOUS BLD VENIPUNCTURE: CPT | Performed by: PHYSICIAN ASSISTANT

## 2022-07-28 PROCEDURE — 85025 COMPLETE CBC W/AUTO DIFF WBC: CPT | Performed by: PHYSICIAN ASSISTANT

## 2022-07-28 PROCEDURE — G0378 HOSPITAL OBSERVATION PER HR: HCPCS

## 2022-07-28 PROCEDURE — 96374 THER/PROPH/DIAG INJ IV PUSH: CPT | Mod: 59

## 2022-07-28 PROCEDURE — 63600175 PHARM REV CODE 636 W HCPCS: Performed by: STUDENT IN AN ORGANIZED HEALTH CARE EDUCATION/TRAINING PROGRAM

## 2022-07-28 PROCEDURE — 25000003 PHARM REV CODE 250: Performed by: EMERGENCY MEDICINE

## 2022-07-28 PROCEDURE — 71045 X-RAY EXAM CHEST 1 VIEW: CPT | Mod: TC

## 2022-07-28 PROCEDURE — 25000003 PHARM REV CODE 250: Performed by: STUDENT IN AN ORGANIZED HEALTH CARE EDUCATION/TRAINING PROGRAM

## 2022-07-28 PROCEDURE — 25500020 PHARM REV CODE 255: Performed by: PHYSICIAN ASSISTANT

## 2022-07-28 PROCEDURE — 63600175 PHARM REV CODE 636 W HCPCS: Performed by: PHYSICIAN ASSISTANT

## 2022-07-28 PROCEDURE — 83605 ASSAY OF LACTIC ACID: CPT | Performed by: STUDENT IN AN ORGANIZED HEALTH CARE EDUCATION/TRAINING PROGRAM

## 2022-07-28 PROCEDURE — 84703 CHORIONIC GONADOTROPIN ASSAY: CPT | Performed by: PHYSICIAN ASSISTANT

## 2022-07-28 PROCEDURE — 96375 TX/PRO/DX INJ NEW DRUG ADDON: CPT

## 2022-07-28 PROCEDURE — 80053 COMPREHEN METABOLIC PANEL: CPT | Performed by: PHYSICIAN ASSISTANT

## 2022-07-28 PROCEDURE — 99285 EMERGENCY DEPT VISIT HI MDM: CPT | Mod: 25

## 2022-07-28 PROCEDURE — 96361 HYDRATE IV INFUSION ADD-ON: CPT

## 2022-07-28 PROCEDURE — 96372 THER/PROPH/DIAG INJ SC/IM: CPT | Performed by: STUDENT IN AN ORGANIZED HEALTH CARE EDUCATION/TRAINING PROGRAM

## 2022-07-28 RX ORDER — SODIUM CHLORIDE 0.9 % (FLUSH) 0.9 %
10 SYRINGE (ML) INJECTION
Status: DISCONTINUED | OUTPATIENT
Start: 2022-07-28 | End: 2022-07-29 | Stop reason: HOSPADM

## 2022-07-28 RX ORDER — TALC
6 POWDER (GRAM) TOPICAL NIGHTLY PRN
Status: DISCONTINUED | OUTPATIENT
Start: 2022-07-28 | End: 2022-07-29 | Stop reason: HOSPADM

## 2022-07-28 RX ORDER — PREGABALIN 50 MG/1
150 CAPSULE ORAL 3 TIMES DAILY
Status: DISCONTINUED | OUTPATIENT
Start: 2022-07-28 | End: 2022-07-29 | Stop reason: HOSPADM

## 2022-07-28 RX ORDER — METHOCARBAMOL 750 MG/1
750 TABLET, FILM COATED ORAL
Status: COMPLETED | OUTPATIENT
Start: 2022-07-28 | End: 2022-07-28

## 2022-07-28 RX ORDER — OXYCODONE AND ACETAMINOPHEN 10; 325 MG/1; MG/1
1 TABLET ORAL ONCE
Status: COMPLETED | OUTPATIENT
Start: 2022-07-28 | End: 2022-07-28

## 2022-07-28 RX ORDER — LIDOCAINE 50 MG/G
PATCH TOPICAL
COMMUNITY
Start: 2022-06-07 | End: 2023-07-13 | Stop reason: ALTCHOICE

## 2022-07-28 RX ORDER — ENOXAPARIN SODIUM 100 MG/ML
40 INJECTION SUBCUTANEOUS EVERY 12 HOURS
Status: DISCONTINUED | OUTPATIENT
Start: 2022-07-28 | End: 2022-07-29 | Stop reason: HOSPADM

## 2022-07-28 RX ORDER — ONDANSETRON 2 MG/ML
4 INJECTION INTRAMUSCULAR; INTRAVENOUS
Status: COMPLETED | OUTPATIENT
Start: 2022-07-28 | End: 2022-07-28

## 2022-07-28 RX ORDER — OXYCODONE AND ACETAMINOPHEN 5; 325 MG/1; MG/1
1 TABLET ORAL EVERY 4 HOURS PRN
Status: DISCONTINUED | OUTPATIENT
Start: 2022-07-28 | End: 2022-07-29 | Stop reason: HOSPADM

## 2022-07-28 RX ORDER — OXYCODONE AND ACETAMINOPHEN 10; 325 MG/1; MG/1
1 TABLET ORAL EVERY 4 HOURS PRN
Status: DISCONTINUED | OUTPATIENT
Start: 2022-07-28 | End: 2022-07-29 | Stop reason: HOSPADM

## 2022-07-28 RX ORDER — LIDOCAINE 50 MG/G
1 PATCH TOPICAL
Status: COMPLETED | OUTPATIENT
Start: 2022-07-28 | End: 2022-07-29

## 2022-07-28 RX ORDER — METHOCARBAMOL 750 MG/1
750 TABLET, FILM COATED ORAL 4 TIMES DAILY
Status: DISCONTINUED | OUTPATIENT
Start: 2022-07-28 | End: 2022-07-29 | Stop reason: HOSPADM

## 2022-07-28 RX ORDER — ONDANSETRON 4 MG/1
8 TABLET, ORALLY DISINTEGRATING ORAL EVERY 8 HOURS PRN
Status: DISCONTINUED | OUTPATIENT
Start: 2022-07-28 | End: 2022-07-29 | Stop reason: HOSPADM

## 2022-07-28 RX ORDER — SODIUM CHLORIDE, SODIUM LACTATE, POTASSIUM CHLORIDE, CALCIUM CHLORIDE 600; 310; 30; 20 MG/100ML; MG/100ML; MG/100ML; MG/100ML
INJECTION, SOLUTION INTRAVENOUS CONTINUOUS
Status: DISCONTINUED | OUTPATIENT
Start: 2022-07-28 | End: 2022-07-29

## 2022-07-28 RX ORDER — LIDOCAINE 50 MG/G
2 PATCH TOPICAL
Status: DISCONTINUED | OUTPATIENT
Start: 2022-07-29 | End: 2022-07-29 | Stop reason: HOSPADM

## 2022-07-28 RX ORDER — MORPHINE SULFATE 4 MG/ML
4 INJECTION, SOLUTION INTRAMUSCULAR; INTRAVENOUS
Status: COMPLETED | OUTPATIENT
Start: 2022-07-28 | End: 2022-07-28

## 2022-07-28 RX ADMIN — MORPHINE SULFATE 4 MG: 4 INJECTION, SOLUTION INTRAMUSCULAR; INTRAVENOUS at 04:07

## 2022-07-28 RX ADMIN — PREGABALIN 150 MG: 50 CAPSULE ORAL at 10:07

## 2022-07-28 RX ADMIN — ENOXAPARIN SODIUM 40 MG: 40 INJECTION SUBCUTANEOUS at 10:07

## 2022-07-28 RX ADMIN — LIDOCAINE 1 PATCH: 50 PATCH TOPICAL at 07:07

## 2022-07-28 RX ADMIN — SODIUM CHLORIDE, POTASSIUM CHLORIDE, SODIUM LACTATE AND CALCIUM CHLORIDE: 600; 310; 30; 20 INJECTION, SOLUTION INTRAVENOUS at 09:07

## 2022-07-28 RX ADMIN — OXYCODONE AND ACETAMINOPHEN 1 TABLET: 10; 325 TABLET ORAL at 08:07

## 2022-07-28 RX ADMIN — IOPAMIDOL 100 ML: 755 INJECTION, SOLUTION INTRAVENOUS at 03:07

## 2022-07-28 RX ADMIN — METHOCARBAMOL 750 MG: 750 TABLET ORAL at 07:07

## 2022-07-28 RX ADMIN — MELATONIN TAB 3 MG 6 MG: 3 TAB at 10:07

## 2022-07-28 RX ADMIN — ONDANSETRON 4 MG: 2 INJECTION INTRAMUSCULAR; INTRAVENOUS at 04:07
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
## 2022-07-29 ENCOUNTER — HOSPITAL ENCOUNTER (OUTPATIENT)
Dept: RADIOLOGY | Facility: HOSPITAL | Age: 37
Discharge: HOME | End: 2022-07-29
Attending: RADIOLOGY
Payer: COMMERCIAL

## 2022-07-29 VITALS
SYSTOLIC BLOOD PRESSURE: 119 MMHG | HEART RATE: 94 BPM | OXYGEN SATURATION: 100 % | TEMPERATURE: 99 F | DIASTOLIC BLOOD PRESSURE: 77 MMHG | HEIGHT: 64 IN | RESPIRATION RATE: 16 BRPM | WEIGHT: 240 LBS | BODY MASS INDEX: 40.97 KG/M2

## 2022-07-29 PROBLEM — S22.41XA FRACTURE OF MULTIPLE RIBS OF RIGHT SIDE: Status: ACTIVE | Noted: 2022-07-29

## 2022-07-29 LAB
ANION GAP SERPL CALC-SCNC: 8 MEQ/L
BASOPHILS # BLD AUTO: 0.04 X10(3)/MCL (ref 0–0.2)
BASOPHILS NFR BLD AUTO: 0.5 %
BUN SERPL-MCNC: 7.2 MG/DL (ref 7–18.7)
CALCIUM SERPL-MCNC: 8.5 MG/DL (ref 8.4–10.2)
CHLORIDE SERPL-SCNC: 110 MMOL/L (ref 98–107)
CO2 SERPL-SCNC: 24 MMOL/L (ref 22–29)
CREAT SERPL-MCNC: 0.71 MG/DL (ref 0.55–1.02)
CREAT/UREA NIT SERPL: 10
EOSINOPHIL # BLD AUTO: 0.03 X10(3)/MCL (ref 0–0.9)
EOSINOPHIL NFR BLD AUTO: 0.4 %
ERYTHROCYTE [DISTWIDTH] IN BLOOD BY AUTOMATED COUNT: 14.7 % (ref 11.5–17)
GLUCOSE SERPL-MCNC: 84 MG/DL (ref 74–100)
HCT VFR BLD AUTO: 34.5 % (ref 37–47)
HGB BLD-MCNC: 10.2 GM/DL (ref 12–16)
IMM GRANULOCYTES # BLD AUTO: 0.04 X10(3)/MCL (ref 0–0.04)
IMM GRANULOCYTES NFR BLD AUTO: 0.5 %
LYMPHOCYTES # BLD AUTO: 1.2 X10(3)/MCL (ref 0.6–4.6)
LYMPHOCYTES NFR BLD AUTO: 14.9 %
MAGNESIUM SERPL-MCNC: 2.3 MG/DL (ref 1.6–2.6)
MCH RBC QN AUTO: 28.2 PG (ref 27–31)
MCHC RBC AUTO-ENTMCNC: 29.6 MG/DL (ref 33–36)
MCV RBC AUTO: 95.3 FL (ref 80–94)
MONOCYTES # BLD AUTO: 0.63 X10(3)/MCL (ref 0.1–1.3)
MONOCYTES NFR BLD AUTO: 7.8 %
NEUTROPHILS # BLD AUTO: 6.1 X10(3)/MCL (ref 2.1–9.2)
NEUTROPHILS NFR BLD AUTO: 75.9 %
NRBC BLD AUTO-RTO: 0 %
PHOSPHATE SERPL-MCNC: 4 MG/DL (ref 2.3–4.7)
PLATELET # BLD AUTO: 240 X10(3)/MCL (ref 130–400)
PMV BLD AUTO: 9.9 FL (ref 7.4–10.4)
POTASSIUM SERPL-SCNC: 4.5 MMOL/L (ref 3.5–5.1)
RBC # BLD AUTO: 3.62 X10(6)/MCL (ref 4.2–5.4)
SODIUM SERPL-SCNC: 142 MMOL/L (ref 136–145)
WBC # SPEC AUTO: 8.1 X10(3)/MCL (ref 4.5–11.5)

## 2022-07-29 PROCEDURE — 25000003 PHARM REV CODE 250: Performed by: STUDENT IN AN ORGANIZED HEALTH CARE EDUCATION/TRAINING PROGRAM

## 2022-07-29 PROCEDURE — 36415 COLL VENOUS BLD VENIPUNCTURE: CPT | Performed by: STUDENT IN AN ORGANIZED HEALTH CARE EDUCATION/TRAINING PROGRAM

## 2022-07-29 PROCEDURE — G0378 HOSPITAL OBSERVATION PER HR: HCPCS

## 2022-07-29 PROCEDURE — 71045 X-RAY EXAM CHEST 1 VIEW: CPT | Mod: TC

## 2022-07-29 PROCEDURE — 83735 ASSAY OF MAGNESIUM: CPT | Performed by: STUDENT IN AN ORGANIZED HEALTH CARE EDUCATION/TRAINING PROGRAM

## 2022-07-29 PROCEDURE — 80048 BASIC METABOLIC PNL TOTAL CA: CPT | Performed by: STUDENT IN AN ORGANIZED HEALTH CARE EDUCATION/TRAINING PROGRAM

## 2022-07-29 PROCEDURE — 63600175 PHARM REV CODE 636 W HCPCS: Performed by: STUDENT IN AN ORGANIZED HEALTH CARE EDUCATION/TRAINING PROGRAM

## 2022-07-29 PROCEDURE — 96361 HYDRATE IV INFUSION ADD-ON: CPT

## 2022-07-29 PROCEDURE — 97161 PT EVAL LOW COMPLEX 20 MIN: CPT

## 2022-07-29 PROCEDURE — 96372 THER/PROPH/DIAG INJ SC/IM: CPT | Performed by: STUDENT IN AN ORGANIZED HEALTH CARE EDUCATION/TRAINING PROGRAM

## 2022-07-29 PROCEDURE — 85025 COMPLETE CBC W/AUTO DIFF WBC: CPT | Performed by: STUDENT IN AN ORGANIZED HEALTH CARE EDUCATION/TRAINING PROGRAM

## 2022-07-29 PROCEDURE — 84100 ASSAY OF PHOSPHORUS: CPT | Performed by: STUDENT IN AN ORGANIZED HEALTH CARE EDUCATION/TRAINING PROGRAM

## 2022-07-29 PROCEDURE — 97165 OT EVAL LOW COMPLEX 30 MIN: CPT

## 2022-07-29 RX ORDER — METHOCARBAMOL 750 MG/1
750 TABLET, FILM COATED ORAL 4 TIMES DAILY
Qty: 28 TABLET | Refills: 0 | Status: SHIPPED | OUTPATIENT
Start: 2022-07-29 | End: 2022-08-05

## 2022-07-29 RX ADMIN — OXYCODONE AND ACETAMINOPHEN 1 TABLET: 10; 325 TABLET ORAL at 09:07

## 2022-07-29 RX ADMIN — OXYCODONE HYDROCHLORIDE AND ACETAMINOPHEN 1 TABLET: 5; 325 TABLET ORAL at 05:07

## 2022-07-29 RX ADMIN — OXYCODONE AND ACETAMINOPHEN 1 TABLET: 10; 325 TABLET ORAL at 12:07

## 2022-07-29 RX ADMIN — SODIUM CHLORIDE, POTASSIUM CHLORIDE, SODIUM LACTATE AND CALCIUM CHLORIDE: 600; 310; 30; 20 INJECTION, SOLUTION INTRAVENOUS at 05:07

## 2022-07-29 RX ADMIN — METHOCARBAMOL 750 MG: 750 TABLET ORAL at 08:07

## 2022-07-29 RX ADMIN — ENOXAPARIN SODIUM 40 MG: 40 INJECTION SUBCUTANEOUS at 08:07

## 2022-07-29 RX ADMIN — PREGABALIN 150 MG: 50 CAPSULE ORAL at 08:07
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
## 2022-07-29 NOTE — PT/OT/SLP EVAL
Physical Therapy Evaluation and Discharge Note    Patient Name:  Carol Edmond   MRN:  [id number]    Recommendations:     Discharge Recommendations:  home   Discharge Equipment Recommendations: none   Barriers to discharge: None    Assessment:     Carol Edmond is a 37 y.o. female admitted with a medical diagnosis of Fracture of multiple ribs of right side. .  At this time, patient is functioning at their prior level of function and does not require further acute PT services.     Recent Surgery: * No surgery found *      Plan:     During this hospitalization, patient does not require further acute PT services.  Please re-consult if situation changes.      Subjective     Chief Complaint: pain throughout chest and upper back  Patient/Family Comments/goals: return home  Pain/Comfort:  · Pain Rating 1: 8/10  · Location - Side 1: Bilateral  · Location 1: rib(s)  · Pain Addressed 1: Cessation of Activity    Patients cultural, spiritual, Yazdanism conflicts given the current situation:      Living Environment:  Home independently with wife, threshold entry. Previously independent.  Equipment used at home: none.  DME owned (not currently used): rolling walker, single point cane and shower chair.  Upon discharge, patient will have assistance from wife.    Objective:     Communicated with nurse prior to session.  Patient found supine with   upon PT entry to room.    General Precautions: Standard,     Orthopedic Precautions:N/A   Braces: N/A   Respiratory Status: Room air    Exams:  · Cognitive Exam:  Patient is oriented to Person, Place, Time and Situation  · RLE ROM: WNL  · RLE Strength: WNL  · LLE ROM: WNL  · LLE Strength: WNL    Functional Mobility:  · Bed Mobility:     · Rolling Left:  independence  · Rolling Right: independence  · Supine to Sit: independence  · Sit to Supine: independence  · Transfers:     · Sit to Stand:  independence with no AD  · Bed to Chair: independence with  no AD  using  Step  Transfer  · Gait: Pt Independent with amb >300ft without AD. Able to safely complete turns, changes of direction, and distracting environment without LOB.  · Balance: Dynamic standing balance NORMAL.    AM-PAC 6 CLICK MOBILITY  Total Score:24       AM-PAC 6 CLICK MOBILITY  Total Score:24     Patient left up in chair with call button in reach.      History:     Past Medical History:   Diagnosis Date    Chronic back pain     H/O gastric sleeve        Past Surgical History:   Procedure Laterality Date    abominal plasty      BACK SURGERY      BARIATRIC SURGERY      gastric sleeve      SPINAL FUSION         Time Tracking:     PT Received On: 07/29/22  PT Start Time: 0850     PT Stop Time: 0905  PT Total Time (min): 15 min     Billable Minutes: Evaluation (low)      07/29/2022

## 2022-07-29 NOTE — NURSING
Patient discharged home. VSS. No distress noted. No follow-up needed. Prescription sent to patient's pharmacy. Patient verbalizes education in regards to rib fractures, incentive spirometer and when to seek medical care. Transported to [facility] by wheelchair.
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
## 2022-07-29 NOTE — PT/OT/SLP EVAL
Occupational Therapy   Evaluation and Discharge Note    Name: Carol Edmond  MRN: [id number]  Admitting Diagnosis:  Fracture of multiple ribs of right side   Recent Surgery: * No surgery found *      Recommendations:     Discharge Recommendations: home  Discharge Equipment Recommendations:  none  Barriers to discharge:  None    Assessment:     Carol Edmond is a 37 y.o. female with a medical diagnosis of Fracture of multiple ribs of right side. At this time, patient is functioning at their prior level of function and does not require further acute OT services.     Plan:     During this hospitalization, patient does not require further acute OT services.  Please re-consult if situation changes.    · Plan of Care Reviewed with: patient    Subjective     Chief Complaint: c/o rib pain  Patient/Family Comments/goals: go home    Occupational Profile:  Living Environment: lives in  home with wife  Previous level of function: independent  Roles and Routines: loves pets  Equipment Used at home:  none  Assistance upon Discharge: yes    Pain/Comfort:  · Pain Rating 1: 8/10  · Location 1: rib(s)  · Pain Addressed 1: Cessation of Activity    Patients cultural, spiritual, Judaism conflicts given the current situation:      Objective:     Communicated with: nsg and PT prior to session.  Patient found supine with peripheral IV upon OT entry to room.    General Precautions: Standard,     Orthopedic Precautions:    Braces:    Respiratory Status: Room air     Occupational Performance:    Bed Mobility:    · Patient completed Supine to Sit with independence    Functional Mobility/Transfers:  · Patient completed Sit <> Stand Transfer with independence  with  no assistive device   · Functional Mobility: ambulated community distance without AD independent    Activities of Daily Living:  · Toileting: independence toilet    Cognitive/Visual Perceptual:  intact    Physical Exam:  intact    AMPAC 6 Click ADL:  AMPAC Total Score:       Treatment & Education:  Educated on OOB activity, POC.   Education:    Patient left up in chair with call button in reach    GOALS:   Multidisciplinary Problems     Occupational Therapy Goals     Not on file                History:     Past Medical History:   Diagnosis Date    Chronic back pain     H/O gastric sleeve        Past Surgical History:   Procedure Laterality Date    abominal plasty      BACK SURGERY      BARIATRIC SURGERY      gastric sleeve      SPINAL FUSION         Time Tracking:     OT Date of Treatment: 07/29/22  OT Start Time: 0850  OT Stop Time: 0904  OT Total Time (min): 14 min    Billable Minutes:Evaluation low complexity 14 min    7/29/2022
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
## 2022-07-29 NOTE — PLAN OF CARE
"Completed brief intervention with pt who did accept "[employer]" inf.   Pt will return home with wife who will assist as needed. Pt uses a quad cane and shower chair at home. She has been up to the bathroom and feels steady. No additional equipment needed. No steps except the threshold in the home.  PCP is Nic Guzman.   Pt drives but will rely on [place] her spouse to drive as needed.   Pt denies needs.   Anticipate home today, has transportation.  No needs identified  "

## 2022-07-29 NOTE — TERTIARY TRAUMA SURVEY NOTE
TERTIARY TRAUMA SURVEY (TTS)    List Injuries Identified to Date:  1. right 2nd through 7th ribs are fractured anterolaterally    List Operative and Procedures:  1. None during this admission    Past Surgical History:   Procedure Laterality Date    abominal plasty      BACK SURGERY      BARIATRIC SURGERY      gastric sleeve      SPINAL FUSION       Past Medical History:  Depression  Anxiety  Back pain    Active Ambulatory Problems     Diagnosis Date Noted    Dorsalgia of lumbosacral region 07/20/2022    Lumbar radiculopathy 07/20/2022    Status post lumbar spinal fusion 07/20/2022     Resolved Ambulatory Problems     Diagnosis Date Noted    No Resolved Ambulatory Problems     Past Medical History:   Diagnosis Date    Chronic back pain     H/O gastric sleeve      Past Medical History:   Diagnosis Date    Chronic back pain     H/O gastric sleeve      GEN: NAD  NEURO: AAOx3  RESP: No increased WOB, equal rise and fall of the chest. TTP right chest.   CV: Regular rate  ABD: Soft, non tender, non distended. No guarding or rebound  : Browning none  EXT: Moving all extremities spontaneously      Imaging Review:  X-Ray Chest 1 View    Result Date: 7/29/2022  EXAMINATION:  XR CHEST 1 VIEW    CPT 02972    CLINICAL HISTORY:  rib fractures;    COMPARISON:  July 28, 2022    FINDINGS:  Cardiomediastinal silhouette improved parenchymal changes are essentially unchanged as compared with the previous exam.    Right-sided rib fractures are not clearly identified by this examination rib series might prove helpful if clinically indicated        X-Ray Chest 1 View    Result Date: 7/29/2022  EXAMINATION:  XR CHEST 1 VIEW    CLINICAL HISTORY:  , Multiple fractures of ribs, unspecified side, initial encounter for closed fracture.    COMPARISON:  July 24, 2022    FINDINGS:  No alveolar consolidation, effusion, or pneumothorax is seen.   The thoracic aorta is normal  cardiac silhouette, central pulmonary vessels and mediastinum  are normal in size and are grossly unremarkable.   visualized osseous structures are grossly unremarkable.        X-Ray Hand 3 view Left    Result Date: 7/24/2022  EXAMINATION:  XR HAND COMPLETE 3 VIEW LEFT    CLINICAL HISTORY:  dog bite;    TECHNIQUE:  Radiographs of the left hand with AP, lateral and oblique  views.    COMPARISON:  No prior imaging available for comparison    FINDINGS:  There is no acute fracture, subluxation or dislocation.    Joints and interspaces appear maintained.    Osseous structures show normal bone mineral density.    Soft tissues are unremarkable.    There are no radiopaque foreign bodies.      CT Head Without Contrast    Result Date: 7/28/2022  EXAMINATION:  CT HEAD WITHOUT CONTRAST    CLINICAL HISTORY:  Head trauma, moderate-severe;    TECHNIQUE:  Multiple axial images were obtained from the base of the brain to the vertex without contrast administration.  Sagittal and coronal reconstructions were performed..Automatic exposure control is utilized to reduce patient radiation exposure.    COMPARISON:  None    FINDINGS:  There is no intracranial mass or lesion seen.  No hemorrhage is seen.  No infarct is seen.  The ventricles and basilar cisterns appear normal.  Brain parenchyma appears grossly unremarkable.    Posterior fossa appears normal.  The calvarium is intact.  The paranasal sinuses appear grossly unremarkable.        CT Cervical Spine Without Contrast    Result Date: 7/28/2022  EXAMINATION:  CT CERVICAL SPINE WITHOUT CONTRAST    CLINICAL HISTORY:  Neck trauma, dangerous injury mechanism (Age 16-64y);    TECHNIQUE:  Low dose axial images, sagittal and coronal reformations were performed though the cervical spine.  Contrast was not administered. Automatic exposure control is utilized to reduce patient radiation exposure.    COMPARISON:  None    FINDINGS:  The vertebral body heights are well maintained. There is some loss of the normal lordotic curve cervical spine most likely  related to spasm. No fracture is seen. No dislocation is seen. The odontoid and lateral masses appear grossly unremarkable.      CT Lumbar Spine Without Contrast    Result Date: 7/3/2022  EXAMINATION:  CT LUMBAR SPINE WITHOUT CONTRAST    CLINICAL HISTORY:  Lumbar radiculopathy, trauma;    TECHNIQUE:  Axial scans were obtained through the lumbar spine.    Coronal and sagittal reconstructions obtained from the axial data.    Automatic exposure control was utilized to limit radiation dose.    Contrast: None    Radiation Dose:    Total DLP: 1120 mGy*cm    COMPARISON:  Lumbar spine CT 06/13/2022    FINDINGS:  Number of lumbar vertebral bodies: 5.    Alignment: Normal lordosis. No scoliosis.    Soft tissues: No abnormalities.    Sacroiliac joints: Normal.    Vertebrae:    No fractures, infection or neoplasm.    Status post interbody fusion at L5-S1.    Degenerative changes:    No significant canal or foraminal narrowing.    CT Maxillofacial Without Contrast    Result Date: 7/28/2022  EXAMINATION:  CT MAXILLOFACIAL WITHOUT CONTRAST    CLINICAL HISTORY:  Facial trauma, blunt;    TECHNIQUE:  Low dose axial images, sagittal and coronal reformations were obtained through the face.  Contrast was not administered. Automatic exposure control is utilized to reduce patient radiation exposure.    COMPARISON:  None    FINDINGS:  The mandible is intact.  The maxilla is intact.    The zygoma is intact bilaterally.    The medial and lateral pterygoids are intact.    The orbits are intact.  No orbital fracture is seen.    The nasal bone is intact.    The paranasal sinuses appear normal..    No periorbital soft tissue swelling is seen.  No facial soft tissue swelling is seen.    The frontal bone is intact.    X-Ray Chest AP Portable    Result Date: 7/24/2022  EXAMINATION:  XR CHEST AP PORTABLE    TECHNIQUE:  Single view of the chest    COMPARISON:  No prior imaging available for comparison.    FINDINGS:  No focal opacification, pleural  effusion, or pneumothorax.    The cardiomediastinal silhouette is within normal limits.    No acute osseous abnormality.    CT Chest Abdomen Pelvis With Contrast (xpd)    Result Date: 7/28/2022  EXAMINATION:  CT CHEST ABDOMEN PELVIS WITH CONTRAST (XPD)    CLINICAL HISTORY:  Polytrauma, blunt;    TECHNIQUE:  Helical acquisition with axial, sagittal and coronal reformations obtained from the thoracic inlet to the pubic symphysis following the IV administration of contrast.    Automated tube current modulation, weight-based exposure dosing, and/or iterative reconstruction technique utilized to reach lowest reasonably achievable exposure rate.    DLP:  mGy*cm    COMPARISON:  No relevant priors available for comparison at time of this dictation    FINDINGS:  BASE OF NECK: No significant abnormality.    AORTA: No aneurysm and no significant atherosclerosis    HEART: Normal size. No effusion.    PULMONARY VASCULATURE: Pulmonary arteries enhance normally.    ELLIOT/MEDIASTINUM: No enlarged lymph nodes by size criteria.    AIRWAYS: Patent.    LUNGS/PLEURA: Clear lungs. No pleural effusion or thickening.  No pneumothorax.    THORACIC SOFT TISSUES: Unremarkable.    LIVER: Normal attenuation. No appreciable focal hepatic lesion.    BILIARY: No calcified gallstones.    PANCREAS: No inflammatory change.    SPLEEN: Normal in size    ADRENALS: No mass.    KIDNEYS/URETERS: The kidneys enhance symmetrically.  No hydronephrosis.    GI TRACT/MESENTERY: Postop sleeve gastrectomy.  No evidence of bowel obstruction or inflammation. The appendix is normal.    PERITONEUM: Small volume free intraperitoneal fluid.No free air.    LYMPH NODES: No enlarged lymph nodes by size criteria.    VASCULATURE: No significant atherosclerosis or aneurysm.    BLADDER: The bladder is distended above the pelvic brim.    REPRODUCTIVE ORGANS: Normal as visualized.  There is a physiologic right ovary corpus luteum cyst.    ABDOMINAL WALL:  Unremarkable.    BONES: The right 2nd through 7th ribs are fractured anterolaterally.  The 3rd and 4th ribs are displaced by 1/2 shaft's width.  Other rib fractures are nondisplaced.  There are postsurgical changes of L5-S1 discectomy with disc prosthesis.    Lab Review:  Troponin:  Recent Labs   Lab Result Units 07/24/22  1150   Troponin-I ng/mL 0.011     CBC:  Recent Labs   Lab Result Units 06/28/22  0847 07/24/22  1150 07/28/22  1414 07/29/22  0504   WBC x10(3)/mcL 4.8 6.1 6.7 8.1   RBC x10(6)/mcL 3.49* 3.45* 3.68* 3.62*   Hgb gm/dL 10.0* 9.9* 10.5* 10.2*   Hct % 33.9* 31.6* 33.2* 34.5*   Platelet x10(3)/mcL 213 303 230 240   MCV fL 97.1* 91.6 90.2 95.3*   MCH pg 28.7 28.7 28.5 28.2   MCHC mg/dL 29.5* 31.3* 31.6* 29.6*     CMP:  Recent Labs   Lab Result Units 06/28/22  0847 07/24/22  1150 07/28/22  1414 07/29/22  0504   Calcium Level Total mg/dL 8.6 9.0 9.1 8.5   Albumin Level gm/dL 3.5 3.6 3.7  --    Sodium Level mmol/L 140 141 138 142   Potassium Level mmol/L 3.8 4.3 4.5 4.5   Carbon Dioxide mmol/L 26 29 22 24   Blood Urea Nitrogen mg/dL 6.6* 10.8 7.5 7.2   Creatinine mg/dL 0.63 0.62 0.67 0.71   Alkaline Phosphatase unit/L 133 117 97  --    Alanine Aminotransferase unit/L 15 14 46  --    Aspartate Aminotransferase unit/L 13 11 87*  --    Bilirubin Total mg/dL 0.2 0.3 0.3  --        Makeda Pierre  Trauma/Critical Care Surgery   7/29/2022  11:18 AM    The above findings, diagnostics, and treatment plan were discussed with the physician who will follow with further assessments and recommendations.

## 2022-07-29 NOTE — PLAN OF CARE
Problem: Adult Inpatient Plan of Care  Goal: Plan of Care Review  Outcome: Met  Flowsheets (Taken 7/29/2022 1125)  Plan of Care Reviewed With: patient  Goal: Patient-Specific Goal (Individualized)  Outcome: Met  Goal: Absence of Hospital-Acquired Illness or Injury  Outcome: Met  Intervention: Prevent and Manage VTE (Venous Thromboembolism) Risk  Flowsheets (Taken 7/29/2022 1125)  Activity Management:   Ambulated in [place] - L4   Up in chair - L3  Range of Motion:   active ROM (range of motion) encouraged   ROM (range of motion) performed  Goal: Readiness for Transition of Care  Outcome: Met

## 2022-07-29 NOTE — CLINICAL REVIEW
Tiffany completed with pt over the phone. She informs me that she has traditional medicare and [facility] with her spouse. Both cards noted to be loaded in images. Sent email to access to notify of this. email sent to christy bravo for delivery.

## 2022-07-30 ENCOUNTER — HOSPITAL ENCOUNTER (EMERGENCY)
Facility: HOSPITAL | Age: 37
Discharge: HOME | End: 2022-07-30
Attending: EMERGENCY MEDICINE
Payer: COMMERCIAL

## 2022-07-30 ENCOUNTER — HOSPITAL ENCOUNTER (EMERGENCY)
Facility: HOSPITAL | Age: 37
Discharge: LEFT BEFORE DISCHARGE | End: 2022-07-30
Attending: EMERGENCY MEDICINE
Payer: COMMERCIAL

## 2022-07-30 VITALS
WEIGHT: 240 LBS | DIASTOLIC BLOOD PRESSURE: 74 MMHG | HEIGHT: 64 IN | TEMPERATURE: 98 F | BODY MASS INDEX: 40.97 KG/M2 | HEART RATE: 79 BPM | RESPIRATION RATE: 18 BRPM | SYSTOLIC BLOOD PRESSURE: 112 MMHG | OXYGEN SATURATION: 100 %

## 2022-07-30 VITALS
SYSTOLIC BLOOD PRESSURE: 131 MMHG | OXYGEN SATURATION: 100 % | HEIGHT: 64 IN | HEART RATE: 95 BPM | WEIGHT: 252.44 LBS | TEMPERATURE: 98 F | RESPIRATION RATE: 22 BRPM | BODY MASS INDEX: 43.1 KG/M2 | DIASTOLIC BLOOD PRESSURE: 83 MMHG

## 2022-07-30 DIAGNOSIS — R06.00 DYSPNEA: ICD-10-CM

## 2022-07-30 DIAGNOSIS — R07.9 CHEST PAIN: ICD-10-CM

## 2022-07-30 DIAGNOSIS — S22.41XD CLOSED FRACTURE OF MULTIPLE RIBS OF RIGHT SIDE WITH ROUTINE HEALING, SUBSEQUENT ENCOUNTER: Primary | ICD-10-CM

## 2022-07-30 PROCEDURE — 93010 ELECTROCARDIOGRAM REPORT: CPT | Mod: ,,, | Performed by: INTERNAL MEDICINE

## 2022-07-30 PROCEDURE — 25000003 PHARM REV CODE 250: Performed by: EMERGENCY MEDICINE

## 2022-07-30 PROCEDURE — 96372 THER/PROPH/DIAG INJ SC/IM: CPT | Performed by: EMERGENCY MEDICINE

## 2022-07-30 PROCEDURE — 93010 EKG 12-LEAD: ICD-10-PCS | Mod: ,,, | Performed by: INTERNAL MEDICINE

## 2022-07-30 PROCEDURE — 99900041 HC LEFT WITHOUT BEING SEEN- EMERGENCY

## 2022-07-30 PROCEDURE — 99284 EMERGENCY DEPT VISIT MOD MDM: CPT | Mod: 25

## 2022-07-30 PROCEDURE — 63600175 PHARM REV CODE 636 W HCPCS: Performed by: EMERGENCY MEDICINE

## 2022-07-30 PROCEDURE — 93005 ELECTROCARDIOGRAM TRACING: CPT

## 2022-07-30 RX ORDER — HYDROMORPHONE HYDROCHLORIDE 2 MG/ML
1 INJECTION, SOLUTION INTRAMUSCULAR; INTRAVENOUS; SUBCUTANEOUS
Status: COMPLETED | OUTPATIENT
Start: 2022-07-30 | End: 2022-07-30

## 2022-07-30 RX ORDER — OXYCODONE AND ACETAMINOPHEN 10; 325 MG/1; MG/1
1 TABLET ORAL EVERY 6 HOURS PRN
Qty: 12 TABLET | Refills: 0 | Status: SHIPPED | OUTPATIENT
Start: 2022-07-30 | End: 2022-08-04 | Stop reason: SDUPTHER

## 2022-07-30 RX ORDER — ONDANSETRON 4 MG/1
4 TABLET, ORALLY DISINTEGRATING ORAL
Status: COMPLETED | OUTPATIENT
Start: 2022-07-30 | End: 2022-07-30

## 2022-07-30 RX ADMIN — HYDROMORPHONE HYDROCHLORIDE 1 MG: 2 INJECTION, SOLUTION INTRAMUSCULAR; INTRAVENOUS; SUBCUTANEOUS at 07:07

## 2022-07-30 RX ADMIN — ONDANSETRON 4 MG: 4 TABLET, ORALLY DISINTEGRATING ORAL at 07:07
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
## 2022-08-01 ENCOUNTER — TELEPHONE (OUTPATIENT)
Dept: PAIN MEDICINE | Facility: CLINIC | Age: 37
End: 2022-08-01
Payer: COMMERCIAL

## 2022-08-02 ENCOUNTER — OFFICE VISIT (OUTPATIENT)
Dept: PAIN MEDICINE | Facility: CLINIC | Age: 37
End: 2022-08-02
Payer: COMMERCIAL

## 2022-08-02 ENCOUNTER — PATIENT MESSAGE (OUTPATIENT)
Dept: PAIN MEDICINE | Facility: HOSPITAL | Age: 37
End: 2022-08-02

## 2022-08-02 ENCOUNTER — HOSPITAL ENCOUNTER (EMERGENCY)
Facility: HOSPITAL | Age: 37
Discharge: HOME | End: 2022-08-02
Attending: EMERGENCY MEDICINE
Payer: COMMERCIAL

## 2022-08-02 ENCOUNTER — HOSPITAL ENCOUNTER (OUTPATIENT)
Facility: HOSPITAL | Age: 37
Discharge: HOME | End: 2022-08-02
Attending: PAIN MEDICINE | Admitting: PAIN MEDICINE
Payer: COMMERCIAL

## 2022-08-02 VITALS
OXYGEN SATURATION: 99 % | WEIGHT: 255 LBS | HEART RATE: 86 BPM | BODY MASS INDEX: 43.54 KG/M2 | TEMPERATURE: 98 F | SYSTOLIC BLOOD PRESSURE: 144 MMHG | RESPIRATION RATE: 17 BRPM | HEIGHT: 64 IN | DIASTOLIC BLOOD PRESSURE: 80 MMHG

## 2022-08-02 VITALS
OXYGEN SATURATION: 98 % | SYSTOLIC BLOOD PRESSURE: 136 MMHG | DIASTOLIC BLOOD PRESSURE: 92 MMHG | HEIGHT: 64 IN | WEIGHT: 250 LBS | BODY MASS INDEX: 42.68 KG/M2 | RESPIRATION RATE: 18 BRPM | HEART RATE: 84 BPM | TEMPERATURE: 98 F

## 2022-08-02 VITALS
BODY MASS INDEX: 41.2 KG/M2 | DIASTOLIC BLOOD PRESSURE: 85 MMHG | HEIGHT: 64 IN | SYSTOLIC BLOOD PRESSURE: 126 MMHG | HEART RATE: 70 BPM

## 2022-08-02 DIAGNOSIS — F11.90 CHRONIC, CONTINUOUS USE OF OPIOIDS: Primary | ICD-10-CM

## 2022-08-02 DIAGNOSIS — M54.50 DORSALGIA OF LUMBOSACRAL REGION: Primary | ICD-10-CM

## 2022-08-02 DIAGNOSIS — G89.4 CHRONIC PAIN SYNDROME: ICD-10-CM

## 2022-08-02 DIAGNOSIS — M54.16 LUMBAR RADICULOPATHY: ICD-10-CM

## 2022-08-02 DIAGNOSIS — Z98.1 STATUS POST LUMBAR SPINAL FUSION: ICD-10-CM

## 2022-08-02 DIAGNOSIS — M47.816 LUMBAR SPONDYLOSIS: ICD-10-CM

## 2022-08-02 DIAGNOSIS — M54.50 DORSALGIA OF LUMBOSACRAL REGION: ICD-10-CM

## 2022-08-02 DIAGNOSIS — S46.011A ROTATOR CUFF STRAIN, RIGHT, INITIAL ENCOUNTER: ICD-10-CM
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
## 2022-08-02 DIAGNOSIS — G89.29 CHRONIC PAIN: ICD-10-CM
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
## 2022-08-02 LAB
B-HCG UR QL: NEGATIVE
CTP QC/QA: YES

## 2022-08-02 PROCEDURE — 64494 INJ PARAVERT F JNT L/S 2 LEV: CPT | Mod: 50,,, | Performed by: PAIN MEDICINE

## 2022-08-02 PROCEDURE — 1159F MED LIST DOCD IN RCRD: CPT | Mod: CPTII,S$GLB,, | Performed by: NURSE PRACTITIONER

## 2022-08-02 PROCEDURE — 99283 EMERGENCY DEPT VISIT LOW MDM: CPT | Mod: 25

## 2022-08-02 PROCEDURE — 3074F PR MOST RECENT SYSTOLIC BLOOD PRESSURE < 130 MM HG: ICD-10-PCS | Mod: CPTII,S$GLB,, | Performed by: NURSE PRACTITIONER

## 2022-08-02 PROCEDURE — 3008F PR BODY MASS INDEX (BMI) DOCUMENTED: ICD-10-PCS | Mod: CPTII,S$GLB,, | Performed by: NURSE PRACTITIONER

## 2022-08-02 PROCEDURE — 99214 OFFICE O/P EST MOD 30 MIN: CPT | Mod: S$GLB,,, | Performed by: NURSE PRACTITIONER

## 2022-08-02 PROCEDURE — 1159F PR MEDICATION LIST DOCUMENTED IN MEDICAL RECORD: ICD-10-PCS | Mod: CPTII,S$GLB,, | Performed by: NURSE PRACTITIONER

## 2022-08-02 PROCEDURE — 64493 INJ PARAVERT F JNT L/S 1 LEV: CPT | Mod: 50 | Performed by: PAIN MEDICINE

## 2022-08-02 PROCEDURE — 3079F DIAST BP 80-89 MM HG: CPT | Mod: CPTII,S$GLB,, | Performed by: NURSE PRACTITIONER

## 2022-08-02 PROCEDURE — 99214 PR OFFICE/OUTPT VISIT, EST, LEVL IV, 30-39 MIN: ICD-10-PCS | Mod: S$GLB,,, | Performed by: NURSE PRACTITIONER

## 2022-08-02 PROCEDURE — 3008F BODY MASS INDEX DOCD: CPT | Mod: CPTII,S$GLB,, | Performed by: NURSE PRACTITIONER

## 2022-08-02 PROCEDURE — 64493 INJ PARAVERT F JNT L/S 1 LEV: CPT | Mod: 50,,, | Performed by: PAIN MEDICINE

## 2022-08-02 PROCEDURE — 99999 PR PBB SHADOW E&M-EST. PATIENT-LVL III: ICD-10-PCS | Mod: PBBFAC,,, | Performed by: NURSE PRACTITIONER

## 2022-08-02 PROCEDURE — G0483 DRUG TEST DEF 22+ CLASSES: HCPCS

## 2022-08-02 PROCEDURE — 63600175 PHARM REV CODE 636 W HCPCS: Performed by: PAIN MEDICINE

## 2022-08-02 PROCEDURE — 3074F SYST BP LT 130 MM HG: CPT | Mod: CPTII,S$GLB,, | Performed by: NURSE PRACTITIONER

## 2022-08-02 PROCEDURE — 1160F PR REVIEW ALL MEDS BY PRESCRIBER/CLIN PHARMACIST DOCUMENTED: ICD-10-PCS | Mod: CPTII,S$GLB,, | Performed by: NURSE PRACTITIONER

## 2022-08-02 PROCEDURE — 99999 PR PBB SHADOW E&M-EST. PATIENT-LVL III: CPT | Mod: PBBFAC,,, | Performed by: NURSE PRACTITIONER

## 2022-08-02 PROCEDURE — 1160F RVW MEDS BY RX/DR IN RCRD: CPT | Mod: CPTII,S$GLB,, | Performed by: NURSE PRACTITIONER

## 2022-08-02 PROCEDURE — 64493 PR INJ DX/THER AGNT PARAVERT FACET JOINT,IMG GUIDE,LUMBAR/SAC,1ST LVL: ICD-10-PCS | Mod: 50,,, | Performed by: PAIN MEDICINE

## 2022-08-02 PROCEDURE — 81025 URINE PREGNANCY TEST: CPT | Performed by: PAIN MEDICINE

## 2022-08-02 PROCEDURE — 25500020 PHARM REV CODE 255: Performed by: PAIN MEDICINE

## 2022-08-02 PROCEDURE — 64494 INJ PARAVERT F JNT L/S 2 LEV: CPT | Mod: 50 | Performed by: PAIN MEDICINE

## 2022-08-02 PROCEDURE — 3079F PR MOST RECENT DIASTOLIC BLOOD PRESSURE 80-89 MM HG: ICD-10-PCS | Mod: CPTII,S$GLB,, | Performed by: NURSE PRACTITIONER

## 2022-08-02 PROCEDURE — 64494 PR INJ DX/THER AGNT PARAVERT FACET JOINT,IMG GUIDE,LUMBAR/SAC, 2ND LEVEL: ICD-10-PCS | Mod: 50,,, | Performed by: PAIN MEDICINE

## 2022-08-02 PROCEDURE — 25000003 PHARM REV CODE 250: Performed by: PAIN MEDICINE

## 2022-08-02 RX ORDER — LIDOCAINE HYDROCHLORIDE 10 MG/ML
INJECTION INFILTRATION; PERINEURAL
Status: DISCONTINUED | OUTPATIENT
Start: 2022-08-02 | End: 2022-08-02 | Stop reason: HOSPADM

## 2022-08-02 RX ORDER — INDOMETHACIN 25 MG/1
CAPSULE ORAL
Status: DISCONTINUED | OUTPATIENT
Start: 2022-08-02 | End: 2022-08-02 | Stop reason: HOSPADM

## 2022-08-02 RX ORDER — LIDOCAINE HYDROCHLORIDE 10 MG/ML
1 INJECTION, SOLUTION EPIDURAL; INFILTRATION; INTRACAUDAL; PERINEURAL ONCE
Status: DISCONTINUED | OUTPATIENT
Start: 2022-08-02 | End: 2022-08-02 | Stop reason: HOSPADM

## 2022-08-02 RX ORDER — METHYLPREDNISOLONE ACETATE 40 MG/ML
INJECTION, SUSPENSION INTRA-ARTICULAR; INTRALESIONAL; INTRAMUSCULAR; SOFT TISSUE
Status: DISCONTINUED | OUTPATIENT
Start: 2022-08-02 | End: 2022-08-02 | Stop reason: HOSPADM

## 2022-08-02 RX ORDER — SODIUM CHLORIDE 9 MG/ML
500 INJECTION, SOLUTION INTRAVENOUS CONTINUOUS
Status: DISCONTINUED | OUTPATIENT
Start: 2022-08-02 | End: 2022-08-02 | Stop reason: HOSPADM

## 2022-08-02 RX ORDER — BUPIVACAINE HYDROCHLORIDE 2.5 MG/ML
INJECTION, SOLUTION EPIDURAL; INFILTRATION; INTRACAUDAL
Status: DISCONTINUED | OUTPATIENT
Start: 2022-08-02 | End: 2022-08-02 | Stop reason: HOSPADM

## 2022-08-02 RX ORDER — ALPRAZOLAM 0.5 MG/1
0.5 TABLET, ORALLY DISINTEGRATING ORAL ONCE
Status: COMPLETED | OUTPATIENT
Start: 2022-08-02 | End: 2022-08-02

## 2022-08-02 RX ADMIN — ALPRAZOLAM 0.5 MG: 0.5 TABLET, ORALLY DISINTEGRATING ORAL at 12:08

## 2022-08-02 NOTE — LETTER
August 2, 2022    Carol Edmond  [address] [place] [address]             [facility]  [address]  [place] [address]  Phone: [phone] Dear Ms. Edmond:    I am notifying you of discharge from opioid therapy from this office.  You have violated your pain contract requiring you to abstain from taking medications that were not prescribed to you.  Your recent urine drug screen showed the presence of benzodiazepines, which are not prescribed to you based on your [place] pharmacy report.    I look forward to continuing to work with you on non-opioid medications, functional restoration, and interventional procedures.    If you have any questions or concerns, please don't hesitate to call.    Sincerely,        Robert Reynolds MD  [facility] [place]  Interventional Pain Management    2/2/2022

## 2022-08-02 NOTE — INTERVAL H&P NOTE
The patient was examined and no significant changes were noted from the updated H&P or last clinic note.    The risks and benefits of this procedure, including alternative therapies, were discussed with the patient.  The discussion of risks included infection, bleeding, need for additional procedures or surgery, nerve damage, paralysis, adverse medication reaction(s), stroke, and if appropriate for the procedure, death.  Questions regarding the procedure, risks, expected outcome, and possible side effects were solicited and answered to Carol's satisfaction.  Carol Edmond wishes to proceed with the injection or procedure as confirmed by written consent.    Robert Reynolds Jr, MD  Interventional Pain Medicine / Anesthesiology
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
## 2022-08-02 NOTE — OP NOTE
"Procedure Note    Pre-operative diagnosis: Lumbar Spondylosis  Post-operative diagnosis: Lumbar Spondylosis  Procedure Date: 08/02/2022  Procedure:  (1) Bilateral L4-5 and L5-S1 Lumbar Facet Injection    (2) Fluoroscopy for needle guidance    Procedure in detail:  Informed consent obtained after explaining the procedure and potential complications including local discomfort, infection, headache, temporary or permanent weakness and/or numbness of one or both legs, temporary or permanent paraplegia, heart attack and stroke. All questions were answered.      Patient was taken to the procedure room and placed in prone position.  Time out was performed.  Patient's Lumbar area was prepped and draped in a sterile manner.  Ipsilateral oblique fluoroscopy were used to identify and jennifer the right L4-5 and L5-S1 facets.  Skin and tissues overlying the targeted points were anesthetized with Lidocaine 1% using a 25G 1.25" needle.  Then, under fluoroscopic guidance, a 22 G 3.5 inch spinal needle, was advanced through the anesthetized tissues toward the targeted points corresponding to the facets joints.    After heme-negative aspiration, 0.2 mL of contrast was administered demonstrating appropriate spread for medial branch coverage.  Next, 1 mL from a 4 mL mixture of 0.5% bupivacaine (3 mL) and Depomedrol 40 mg (1 mL) was injected at each location.  Needles were removed with lidocaine flush.    The procedure was repeated on the left side with similar findings.  Needle placement and contrast spread were consistent with successful facet injection.    Needle was removed with flush and bandaged placed over site of needle insertion.      Estimated Blood Loss: None    Disposition: The patient tolerated the procedure without complaint and was transported to the recovery room in stable condition.    Follow-up: Return for RFA if appropriate      Robert Reynolds Jr, MD  Interventional Pain Medicine / Anesthesiology    "
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
## 2022-08-04 ENCOUNTER — PATIENT MESSAGE (OUTPATIENT)
Dept: PAIN MEDICINE | Facility: CLINIC | Age: 37
End: 2022-08-04
Payer: COMMERCIAL

## 2022-08-04 RX ORDER — OXYCODONE AND ACETAMINOPHEN 10; 325 MG/1; MG/1
TABLET ORAL
Qty: 42 TABLET | Refills: 0 | Status: SHIPPED | OUTPATIENT
Start: 2022-08-05 | End: 2022-08-26

## 2022-08-04 NOTE — TELEPHONE ENCOUNTER
Discussed patient with Dr. Reynolds. Due to previous non-compliance with opioid contract and concern for opioid use disorder, he recommends starting opioid taper. Will send in Percocet 10/325 mg TID x 7 days, then BID x 7 days, then daily x 7 days and discontinue. #42 tablets. She has already been supplied with addiction treatment facility information per documentation.  reviewed.

## 2022-08-04 NOTE — PROGRESS NOTES
I spoke with a patient today and explained the findings of her recent urine drug screens. She was informed that we would provide a tapering prescription for opioids.  Controlled substances will no longer be prescribed from this office because of her violation of the opioid contract signed with me.    When she was confronted with the information she stated that her use of a benzodiazepine was a singular event, which is false as I can see that her urine was positive for benzos before.  She suggests a pattern of use and aberrent behavior.    ATW

## 2022-08-10 DIAGNOSIS — M54.16 LUMBAR RADICULOPATHY: ICD-10-CM

## 2022-08-10 DIAGNOSIS — M54.50 DORSALGIA OF LUMBOSACRAL REGION: Primary | ICD-10-CM

## 2022-08-10 DIAGNOSIS — Z98.1 STATUS POST LUMBAR SPINAL FUSION: ICD-10-CM

## 2022-08-10 RX ORDER — PREGABALIN 150 MG/1
150 CAPSULE ORAL 3 TIMES DAILY
Qty: 90 CAPSULE | Refills: 1 | Status: CANCELLED | OUTPATIENT
Start: 2022-08-10

## 2022-08-10 RX ORDER — TIZANIDINE 4 MG/1
4 TABLET ORAL 2 TIMES DAILY PRN
Qty: 60 TABLET | Refills: 0 | Status: SHIPPED | OUTPATIENT
Start: 2022-08-10 | End: 2022-09-09

## 2022-08-10 NOTE — TELEPHONE ENCOUNTER
Patient is requesting refill of pregabalin 150 mg and tizanidine.  Review of her [facility] report shows that she refilled pregabalin 7 days ago, and should have more than enough supply.  In fact, that refill submitted 7 days ago has 1 refill on it, which would be a quantity sufficient for 60 days.    I will refill the tizanidine now.    The request for pregabalin refill is concerning as there is abuse potential for this medication, and as previously documented, this patient violated her opioid contract by having benzodiazepines in her system that were not prescribed to her on multiple occasions.    Robert Reynolds Jr, MD  Interventional Pain Medicine / Anesthesiology

## 2022-08-11 NOTE — DISCHARGE SUMMARY
[facility]  Discharge Note  Short Stay     Admit Date: 8/11/2022    Discharge Date: 8/11/2022     Attending Physician: Robert Reynolds Jr, MD    Diagnoses:  There are no hospital problems to display for this patient.    Discharged Condition: Good     Hospital Course: Patient was admitted for an outpatient interventional pain management procedure and tolerated the procedure well with no complications.     Final Diagnoses: Same as principal problem.     Disposition: Home or Self Care     Follow up/Patient Instructions:        Reconciled Medications:     Medication List      CONTINUE taking these medications    ARIPiprazole 10 MG Tab  Commonly known as: ABILIFY  Take 5 mg by mouth 2 (two) times daily.     BUSPAR ORAL     * DULoxetine 30 MG capsule  Commonly known as: CYMBALTA  Take 30 mg by mouth once daily.     * DULoxetine 60 MG capsule  Commonly known as: CYMBALTA  Take 60 mg by mouth once daily.     LIDOcaine 5 %  Commonly known as: LIDODERM     pregabalin 150 MG capsule  Commonly known as: LYRICA  Take 1 capsule (150 mg total) by mouth 3 (three) times daily.     traZODone 50 MG tablet  Commonly known as: DESYREL  Take 50 mg by mouth nightly.         * This list has 2 medication(s) that are the same as other medications prescribed for you. Read the directions carefully, and ask your doctor or other care provider to review them with you.            STOP taking these medications    amoxicillin-clavulanate 875-125mg 875-125 mg per tablet  Commonly known as: AUGMENTIN        ASK your doctor about these medications    methocarbamoL 750 MG Tab  Commonly known as: ROBAXIN  Take 1 tablet (750 mg total) by mouth 4 (four) times daily. for 7 days  Ask about: Should I take this medication?           Discharge Procedure Orders (must include Diet, Follow-up, Activity)   Diet Adult Regular     No driving until:   Order Comments: If you received sedation, no driving for 12 hrs     Remove dressing in 24 hours      Notify your health care provider if you experience any of the following:  temperature >100.4     Notify your health care provider if you experience any of the following:  severe uncontrolled pain     Notify your health care provider if you experience any of the following:  redness, tenderness, or signs of infection (pain, swelling, redness, odor or green/yellow discharge around incision site)     Notify your health care provider if you experience any of the following:  difficulty breathing or increased cough     Notify your health care provider if you experience any of the following:  severe persistent headache     Notify your health care provider if you experience any of the following:  increased confusion or weakness     Activity as tolerated       Robert Reynolds Jr, MD  Interventional Pain Medicine / Anesthesiology

## 2022-09-07 ENCOUNTER — HOSPITAL ENCOUNTER (EMERGENCY)
Facility: HOSPITAL | Age: 37
Discharge: HOME | End: 2022-09-07
Attending: INTERNAL MEDICINE
Payer: COMMERCIAL

## 2022-09-07 VITALS
BODY MASS INDEX: 42.68 KG/M2 | SYSTOLIC BLOOD PRESSURE: 108 MMHG | WEIGHT: 250 LBS | HEART RATE: 96 BPM | OXYGEN SATURATION: 98 % | DIASTOLIC BLOOD PRESSURE: 80 MMHG | TEMPERATURE: 98 F | RESPIRATION RATE: 18 BRPM | HEIGHT: 64 IN

## 2022-09-07 DIAGNOSIS — G89.4 CHRONIC PAIN SYNDROME: Primary | ICD-10-CM

## 2022-09-07 PROCEDURE — 99283 EMERGENCY DEPT VISIT LOW MDM: CPT

## 2022-09-07 RX ORDER — OXYCODONE AND ACETAMINOPHEN 5; 325 MG/1; MG/1
1 TABLET ORAL EVERY 6 HOURS PRN
Qty: 12 TABLET | Refills: 0 | Status: SHIPPED | OUTPATIENT
Start: 2022-09-07 | End: 2022-09-11 | Stop reason: ALTCHOICE

## 2022-09-07 NOTE — ED PROVIDER NOTES
Source of History:  Patient, no limitations    Chief complaint:  Back Pain (Pt to er c/o low backpain radiating to legs onset 2017.)      HPI:  Carol Edmond is a 37 y.o. female presenting with Back Pain (Pt to er c/o low backpain radiating to legs onset 2017.)       37-year-old female with history of chronic low back pain, states history of surgery in March of this year, reports history of Pain Management Clinic, previously on Percocet, last dose was several months ago, noted on file shows that she was taking a benzo unprescribed breaking a previous pain contract. Back pain is chronic, radiating to both legs, no red flags        Review of Systems   Constitutional symptoms:  Negative except as documented in HPI.   Skin symptoms:  Negative except as documented in HPI.   HEENT symptoms:  Negative except as documented in HPI.   Respiratory symptoms:  Negative except as documented in HPI.   Cardiovascular symptoms:  Negative except as documented in HPI.   Gastrointestinal symptoms:  Negative except as documented in HPI.    Genitourinary symptoms:  Negative except as documented in HPI.   Musculoskeletal symptoms:  Negative except as documented in HPI.   Neurologic symptoms:  Negative except as documented in HPI.   Psychiatric symptoms:  Negative except as documented in HPI.   Allergy/immunologic symptoms:  Negative except as documented in HPI.             Additional review of systems information: All other systems reviewed and otherwise negative.      Review of patient's allergies indicates:   Allergen Reactions    Nsaids (non-steroidal anti-inflammatory drug)      Hx of bariatric sx       PMH:  As per HPI and below:    Past Medical History:   Diagnosis Date    Chronic back pain     H/O gastric sleeve         No family history on file.    Past Surgical History:   Procedure Laterality Date    abominal plasty      BACK SURGERY      BARIATRIC SURGERY      gastric sleeve      INJECTION OF FACET JOINT Bilateral  "8/2/2022    Procedure: bilateral lumbar facet injection L4-5 and L5-S1 (Iv Sedation);  Surgeon: Robert Reynolds Jr., MD;  Location: [facility];  Service: Pain Management;  Laterality: Bilateral;    SPINAL FUSION         Social History     Tobacco Use    Smoking status: Never    Smokeless tobacco: Never   Substance Use Topics    Alcohol use: Never       Patient Active Problem List   Diagnosis    Dorsalgia of lumbosacral region    Lumbar radiculopathy    Status post lumbar spinal fusion    Fracture of multiple ribs of right side        Physical Exam:    /80 (BP Location: Left arm, Patient Position: Sitting)   Pulse 96   Temp 97.5 °F (36.4 °C) (Temporal)   Resp 18   Ht 5' 4" (1.626 m)   Wt 113.4 kg (250 lb)   LMP 09/04/2022   SpO2 98%   Breastfeeding No   BMI 42.91 kg/m²     Nursing note and vital signs reviewed.    General:  Alert, no acute distress, obese  Skin: Normal for Ethnic Origin, No cyanosis  HEENT: Normocephalic and atraumatic, Vision unchanged, Pupils symmetric, No icterus , Nasal mucosa is pink and moist  Cardiovascular:  Regular rate and rhythm, No edema  Chest Wall: No deformity, equal chest rise  Respiratory:  Lungs are clear to auscultation, respirations are non-labored.    Musculoskeletal:  No deformity, Normal perfusion to all extremities  Back: No bony tenderness, decreased ROM low back area  : No suprapubic pain, No CVA tenderness  Gastrointestinal:  Soft, Nontender, Non distended, Normal bowel sounds.    Neurological:  Alert and oriented to person, place, time, and situation, normal motor observed, normal speech observed.    Psychiatric:  Cooperative, appropriate mood & affect.        Labs that have been ordered have been independently reviewed and interpreted by myself.     Old Chart Reviewed.      Initial Impression/ Differential Dx:  Muscular pain, herniated disc, spine fracture, intra-abdominal causes and urinary tract infection, dehydration.       MDM:      Reviewed " Nurses Note.    Reviewed Pertinent old records.    Orders Placed This Encounter    oxyCODONE-acetaminophen (PERCOCET) 5-325 mg per tablet                    Labs Reviewed - No data to display       No orders to display        No visits with results within 1 Day(s) from this visit.   Latest known visit with results is:   Admission on 08/02/2022, Discharged on 08/02/2022   Component Date Value Ref Range Status    POC Preg Test, Ur 08/02/2022 Negative  Negative Final     Acceptable 08/02/2022 Yes   Final       Imaging Results    None                                              Diagnostic Impression:    1. Chronic pain syndrome         ED Disposition Condition    Discharge Stable             Follow-up Information       [facility] - Emergency Dept.    Specialty: Emergency Medicine  Why: If symptoms worsen  Contact information:  [address]  [facility] [id number] [phone]                            ED Prescriptions       Medication Sig Dispense Start Date End Date Auth. Provider    oxyCODONE-acetaminophen (PERCOCET) 5-325 mg per tablet Take 1 tablet by mouth every 6 (six) hours as needed for Pain. 12 tablet 9/7/2022 -- Gordy Bunch DO          Follow-up Information       Follow up With Specialties Details Why Contact Info    [facility] - Emergency Dept Emergency Medicine  If symptoms worsen [address]  [facility] [id number] [phone]             Gordy Bunch DO  09/07/22 0902

## 2022-09-09 ENCOUNTER — LAB VISIT (OUTPATIENT)
Dept: LAB | Facility: HOSPITAL | Age: 37
End: 2022-09-09
Attending: SURGERY
Payer: COMMERCIAL

## 2022-09-09 DIAGNOSIS — E61.1 IRON DEFICIENCY: ICD-10-CM

## 2022-09-09 DIAGNOSIS — R53.83 FATIGUE, UNSPECIFIED TYPE: Primary | ICD-10-CM

## 2022-09-09 LAB
BASOPHILS # BLD AUTO: 0.02 X10(3)/MCL (ref 0–0.2)
BASOPHILS NFR BLD AUTO: 0.5 %
EOSINOPHIL # BLD AUTO: 0.03 X10(3)/MCL (ref 0–0.9)
EOSINOPHIL NFR BLD AUTO: 0.7 %
ERYTHROCYTE [DISTWIDTH] IN BLOOD BY AUTOMATED COUNT: 15.1 % (ref 11.5–17)
FERRITIN SERPL-MCNC: 4.81 NG/ML (ref 4.63–204)
HCT VFR BLD AUTO: 30.4 % (ref 37–47)
HGB BLD-MCNC: 9 GM/DL (ref 12–16)
IMM GRANULOCYTES # BLD AUTO: 0.01 X10(3)/MCL (ref 0–0.04)
IMM GRANULOCYTES NFR BLD AUTO: 0.2 %
IRON SATN MFR SERPL: 7 % (ref 20–50)
IRON SERPL-MCNC: 27 UG/DL (ref 50–170)
LYMPHOCYTES # BLD AUTO: 1.07 X10(3)/MCL (ref 0.6–4.6)
LYMPHOCYTES NFR BLD AUTO: 26.5 %
MCH RBC QN AUTO: 27 PG (ref 27–31)
MCHC RBC AUTO-ENTMCNC: 29.6 MG/DL (ref 33–36)
MCV RBC AUTO: 91.3 FL (ref 80–94)
MONOCYTES # BLD AUTO: 0.29 X10(3)/MCL (ref 0.1–1.3)
MONOCYTES NFR BLD AUTO: 7.2 %
NEUTROPHILS # BLD AUTO: 2.6 X10(3)/MCL (ref 2.1–9.2)
NEUTROPHILS NFR BLD AUTO: 64.9 %
NRBC BLD AUTO-RTO: 0 %
PLATELET # BLD AUTO: 280 X10(3)/MCL (ref 130–400)
PMV BLD AUTO: 9.8 FL (ref 7.4–10.4)
RBC # BLD AUTO: 3.33 X10(6)/MCL (ref 4.2–5.4)
TIBC SERPL-MCNC: 385 UG/DL (ref 70–310)
TIBC SERPL-MCNC: 412 UG/DL (ref 250–450)
TRANSFERRIN SERPL-MCNC: 403 MG/DL (ref 180–382)
WBC # SPEC AUTO: 4 X10(3)/MCL (ref 4.5–11.5)

## 2022-09-09 PROCEDURE — 82728 ASSAY OF FERRITIN: CPT

## 2022-09-09 PROCEDURE — 83540 ASSAY OF IRON: CPT

## 2022-09-09 PROCEDURE — 85025 COMPLETE CBC W/AUTO DIFF WBC: CPT

## 2022-09-09 PROCEDURE — 36415 COLL VENOUS BLD VENIPUNCTURE: CPT

## 2022-09-10 ENCOUNTER — HOSPITAL ENCOUNTER (EMERGENCY)
Facility: HOSPITAL | Age: 37
Discharge: HOME | End: 2022-09-10
Attending: FAMILY MEDICINE
Payer: COMMERCIAL

## 2022-09-10 VITALS
HEART RATE: 78 BPM | TEMPERATURE: 99 F | WEIGHT: 250 LBS | HEIGHT: 64 IN | SYSTOLIC BLOOD PRESSURE: 128 MMHG | RESPIRATION RATE: 18 BRPM | OXYGEN SATURATION: 100 % | DIASTOLIC BLOOD PRESSURE: 75 MMHG | BODY MASS INDEX: 42.68 KG/M2

## 2022-09-10 DIAGNOSIS — G89.29 CHRONIC BILATERAL LOW BACK PAIN WITH SCIATICA, SCIATICA LATERALITY UNSPECIFIED: Primary | ICD-10-CM

## 2022-09-10 DIAGNOSIS — M54.40 CHRONIC BILATERAL LOW BACK PAIN WITH SCIATICA, SCIATICA LATERALITY UNSPECIFIED: Primary | ICD-10-CM

## 2022-09-10 PROCEDURE — 96372 THER/PROPH/DIAG INJ SC/IM: CPT | Performed by: FAMILY MEDICINE

## 2022-09-10 PROCEDURE — 63600175 PHARM REV CODE 636 W HCPCS: Performed by: FAMILY MEDICINE

## 2022-09-10 PROCEDURE — 25000003 PHARM REV CODE 250: Performed by: FAMILY MEDICINE

## 2022-09-10 PROCEDURE — 99284 EMERGENCY DEPT VISIT MOD MDM: CPT | Mod: 25

## 2022-09-10 RX ORDER — HYDROMORPHONE HYDROCHLORIDE 2 MG/ML
1 INJECTION, SOLUTION INTRAMUSCULAR; INTRAVENOUS; SUBCUTANEOUS
Status: COMPLETED | OUTPATIENT
Start: 2022-09-10 | End: 2022-09-10

## 2022-09-10 RX ORDER — ONDANSETRON 4 MG/1
4 TABLET, ORALLY DISINTEGRATING ORAL
Status: COMPLETED | OUTPATIENT
Start: 2022-09-10 | End: 2022-09-10

## 2022-09-10 RX ORDER — DIAZEPAM 10 MG/2ML
10 INJECTION INTRAMUSCULAR
Status: COMPLETED | OUTPATIENT
Start: 2022-09-10 | End: 2022-09-10

## 2022-09-10 RX ORDER — METHOCARBAMOL 750 MG/1
1500 TABLET, FILM COATED ORAL 3 TIMES DAILY
Qty: 60 TABLET | Refills: 0 | Status: SHIPPED | OUTPATIENT
Start: 2022-09-10 | End: 2022-09-20

## 2022-09-10 RX ORDER — PREDNISONE 50 MG/1
50 TABLET ORAL DAILY
Qty: 5 TABLET | Refills: 0 | Status: SHIPPED | OUTPATIENT
Start: 2022-09-10 | End: 2022-09-15

## 2022-09-10 RX ADMIN — DIAZEPAM 10 MG: 5 INJECTION, SOLUTION INTRAMUSCULAR; INTRAVENOUS at 10:09

## 2022-09-10 RX ADMIN — HYDROMORPHONE HYDROCHLORIDE 1 MG: 2 INJECTION, SOLUTION INTRAMUSCULAR; INTRAVENOUS; SUBCUTANEOUS at 11:09

## 2022-09-10 RX ADMIN — ONDANSETRON 4 MG: 4 TABLET, ORALLY DISINTEGRATING ORAL at 11:09
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
## 2022-09-10 NOTE — ED TRIAGE NOTES
Recheck Pt relates that she has continued back pain with hx of back fusion with care under Dr Douglas with appt scheduled 9/15/22. Pt relates that she is out of Percocet 5 mg tabs from [facility]  which did not help as much as the 10mg tab usually prescribed by Dr Estrada

## 2022-09-11 ENCOUNTER — HOSPITAL ENCOUNTER (EMERGENCY)
Facility: HOSPITAL | Age: 37
Discharge: HOME | End: 2022-09-11
Attending: EMERGENCY MEDICINE
Payer: COMMERCIAL

## 2022-09-11 ENCOUNTER — HOSPITAL ENCOUNTER (EMERGENCY)
Facility: HOSPITAL | Age: 37
Discharge: HOME | End: 2022-09-11
Attending: STUDENT IN AN ORGANIZED HEALTH CARE EDUCATION/TRAINING PROGRAM
Payer: COMMERCIAL

## 2022-09-11 VITALS
OXYGEN SATURATION: 100 % | BODY MASS INDEX: 42.68 KG/M2 | SYSTOLIC BLOOD PRESSURE: 123 MMHG | TEMPERATURE: 98 F | DIASTOLIC BLOOD PRESSURE: 83 MMHG | RESPIRATION RATE: 20 BRPM | HEIGHT: 64 IN | HEART RATE: 77 BPM | WEIGHT: 250 LBS

## 2022-09-11 VITALS
HEART RATE: 71 BPM | SYSTOLIC BLOOD PRESSURE: 136 MMHG | HEIGHT: 64 IN | RESPIRATION RATE: 18 BRPM | OXYGEN SATURATION: 100 % | WEIGHT: 250 LBS | TEMPERATURE: 98 F | BODY MASS INDEX: 42.68 KG/M2 | DIASTOLIC BLOOD PRESSURE: 76 MMHG

## 2022-09-11 DIAGNOSIS — M54.9 BACK PAIN, UNSPECIFIED BACK LOCATION, UNSPECIFIED BACK PAIN LATERALITY, UNSPECIFIED CHRONICITY: Primary | ICD-10-CM

## 2022-09-11 DIAGNOSIS — S20.211D CONTUSION OF RIGHT CHEST WALL, SUBSEQUENT ENCOUNTER: ICD-10-CM
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
## 2022-09-11 LAB — B-HCG SERPL QL: NEGATIVE

## 2022-09-11 PROCEDURE — 99284 EMERGENCY DEPT VISIT MOD MDM: CPT | Mod: 25

## 2022-09-11 PROCEDURE — 25000003 PHARM REV CODE 250: Performed by: EMERGENCY MEDICINE

## 2022-09-11 PROCEDURE — 99284 EMERGENCY DEPT VISIT MOD MDM: CPT | Mod: 25,27

## 2022-09-11 PROCEDURE — 81025 URINE PREGNANCY TEST: CPT | Performed by: EMERGENCY MEDICINE

## 2022-09-11 PROCEDURE — 25000003 PHARM REV CODE 250: Performed by: STUDENT IN AN ORGANIZED HEALTH CARE EDUCATION/TRAINING PROGRAM

## 2022-09-11 RX ORDER — OXYCODONE AND ACETAMINOPHEN 10; 325 MG/1; MG/1
1 TABLET ORAL
Status: COMPLETED | OUTPATIENT
Start: 2022-09-11 | End: 2022-09-11

## 2022-09-11 RX ORDER — HYDROCODONE BITARTRATE AND ACETAMINOPHEN 7.5; 325 MG/1; MG/1
1 TABLET ORAL ONCE
Status: COMPLETED | OUTPATIENT
Start: 2022-09-11 | End: 2022-09-11

## 2022-09-11 RX ORDER — LURASIDONE HYDROCHLORIDE 60 MG/1
TABLET, FILM COATED ORAL
COMMUNITY
Start: 2022-08-18 | End: 2023-07-13 | Stop reason: ALTCHOICE

## 2022-09-11 RX ORDER — HYDROCODONE BITARTRATE AND ACETAMINOPHEN 5; 325 MG/1; MG/1
1 TABLET ORAL EVERY 6 HOURS PRN
Qty: 18 TABLET | Refills: 0 | Status: SHIPPED | OUTPATIENT
Start: 2022-09-11 | End: 2022-09-14

## 2022-09-11 RX ADMIN — HYDROCODONE BITARTRATE AND ACETAMINOPHEN 1 TABLET: 7.5; 325 TABLET ORAL at 10:09

## 2022-09-11 RX ADMIN — OXYCODONE AND ACETAMINOPHEN 1 TABLET: 10; 325 TABLET ORAL at 11:09
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
## 2022-10-10 ENCOUNTER — HOSPITAL ENCOUNTER (EMERGENCY)
Facility: HOSPITAL | Age: 37
Discharge: HOME | End: 2022-10-10
Attending: INTERNAL MEDICINE
Payer: COMMERCIAL

## 2022-10-10 VITALS
WEIGHT: 250 LBS | RESPIRATION RATE: 18 BRPM | DIASTOLIC BLOOD PRESSURE: 80 MMHG | HEIGHT: 64 IN | BODY MASS INDEX: 42.68 KG/M2 | TEMPERATURE: 98 F | SYSTOLIC BLOOD PRESSURE: 136 MMHG | OXYGEN SATURATION: 100 % | HEART RATE: 86 BPM

## 2022-10-10 VITALS
BODY MASS INDEX: 48.49 KG/M2 | HEART RATE: 85 BPM | RESPIRATION RATE: 16 BRPM | DIASTOLIC BLOOD PRESSURE: 83 MMHG | TEMPERATURE: 98 F | OXYGEN SATURATION: 100 % | WEIGHT: 284 LBS | SYSTOLIC BLOOD PRESSURE: 123 MMHG | HEIGHT: 64 IN

## 2022-10-10 DIAGNOSIS — M62.838 MUSCLE SPASM: ICD-10-CM

## 2022-10-10 DIAGNOSIS — G89.29 CHRONIC LOW BACK PAIN WITH SCIATICA, SCIATICA LATERALITY UNSPECIFIED, UNSPECIFIED BACK PAIN LATERALITY: ICD-10-CM

## 2022-10-10 DIAGNOSIS — M54.41 CHRONIC MIDLINE LOW BACK PAIN WITH BILATERAL SCIATICA: Primary | ICD-10-CM

## 2022-10-10 DIAGNOSIS — S39.012S STRAIN OF LUMBAR REGION, SEQUELA: Primary | ICD-10-CM

## 2022-10-10 DIAGNOSIS — M54.40 CHRONIC LOW BACK PAIN WITH SCIATICA, SCIATICA LATERALITY UNSPECIFIED, UNSPECIFIED BACK PAIN LATERALITY: ICD-10-CM

## 2022-10-10 DIAGNOSIS — G89.29 CHRONIC MIDLINE LOW BACK PAIN WITH BILATERAL SCIATICA: Primary | ICD-10-CM

## 2022-10-10 DIAGNOSIS — M54.42 CHRONIC MIDLINE LOW BACK PAIN WITH BILATERAL SCIATICA: Primary | ICD-10-CM

## 2022-10-10 LAB
AMORPH URATE CRY URNS QL MICRO: ABNORMAL /HPF
AMPHET UR QL SCN: NEGATIVE
APPEARANCE UR: ABNORMAL
B-HCG SERPL QL: NEGATIVE
BACTERIA #/AREA URNS AUTO: ABNORMAL /HPF
BARBITURATE SCN PRESENT UR: NEGATIVE
BENZODIAZ UR QL SCN: NEGATIVE
BILIRUB UR QL STRIP.AUTO: NEGATIVE MG/DL
CANNABINOIDS UR QL SCN: POSITIVE
COCAINE UR QL SCN: NEGATIVE
COLOR UR AUTO: YELLOW
FENTANYL UR QL SCN: NEGATIVE
GLUCOSE UR QL STRIP.AUTO: NEGATIVE MG/DL
KETONES UR QL STRIP.AUTO: NEGATIVE MG/DL
LEUKOCYTE ESTERASE UR QL STRIP.AUTO: ABNORMAL UNIT/L
MDMA UR QL SCN: NEGATIVE
NITRITE UR QL STRIP.AUTO: NEGATIVE
OPIATES UR QL SCN: NEGATIVE
PCP UR QL: NEGATIVE
PH UR STRIP.AUTO: 6.5 [PH]
PH UR: 6.5 [PH] (ref 3–11)
PROT UR QL STRIP.AUTO: NEGATIVE MG/DL
RBC #/AREA URNS AUTO: ABNORMAL /HPF
RBC UR QL AUTO: NEGATIVE UNIT/L
SP GR UR STRIP.AUTO: >=1.03
SPECIFIC GRAVITY, URINE AUTO (.000) (OHS): >=1.03 (ref 1–1.03)
SQUAMOUS #/AREA URNS AUTO: ABNORMAL /HPF
UROBILINOGEN UR STRIP-ACNC: 0.2 MG/DL
WBC #/AREA URNS AUTO: ABNORMAL /HPF

## 2022-10-10 PROCEDURE — 96372 THER/PROPH/DIAG INJ SC/IM: CPT | Performed by: INTERNAL MEDICINE

## 2022-10-10 PROCEDURE — 81001 URINALYSIS AUTO W/SCOPE: CPT | Mod: 59 | Performed by: INTERNAL MEDICINE

## 2022-10-10 PROCEDURE — 25000003 PHARM REV CODE 250: Performed by: PHYSICIAN ASSISTANT

## 2022-10-10 PROCEDURE — 80307 DRUG TEST PRSMV CHEM ANLYZR: CPT | Performed by: INTERNAL MEDICINE

## 2022-10-10 PROCEDURE — 99284 EMERGENCY DEPT VISIT MOD MDM: CPT | Mod: 25,27

## 2022-10-10 PROCEDURE — 99283 EMERGENCY DEPT VISIT LOW MDM: CPT

## 2022-10-10 PROCEDURE — 81025 URINE PREGNANCY TEST: CPT | Performed by: INTERNAL MEDICINE

## 2022-10-10 PROCEDURE — 63600175 PHARM REV CODE 636 W HCPCS: Performed by: INTERNAL MEDICINE

## 2022-10-10 RX ORDER — KETOROLAC TROMETHAMINE 30 MG/ML
60 INJECTION, SOLUTION INTRAMUSCULAR; INTRAVENOUS
Status: COMPLETED | OUTPATIENT
Start: 2022-10-10 | End: 2022-10-10

## 2022-10-10 RX ORDER — ORPHENADRINE CITRATE 30 MG/ML
60 INJECTION INTRAMUSCULAR; INTRAVENOUS
Status: COMPLETED | OUTPATIENT
Start: 2022-10-10 | End: 2022-10-10

## 2022-10-10 RX ORDER — HYDROCODONE BITARTRATE AND ACETAMINOPHEN 10; 325 MG/1; MG/1
1 TABLET ORAL
Status: COMPLETED | OUTPATIENT
Start: 2022-10-10 | End: 2022-10-10

## 2022-10-10 RX ADMIN — KETOROLAC TROMETHAMINE 60 MG: 60 INJECTION, SOLUTION INTRAMUSCULAR at 11:10

## 2022-10-10 RX ADMIN — ORPHENADRINE CITRATE 60 MG: 60 INJECTION INTRAMUSCULAR; INTRAVENOUS at 11:10

## 2022-10-10 RX ADMIN — HYDROCODONE BITARTRATE AND ACETAMINOPHEN 1 TABLET: 10; 325 TABLET ORAL at 06:10

## 2022-10-10 NOTE — ED PROVIDER NOTES
Encounter Date: 10/10/2022       History     Chief Complaint   Patient presents with    Back Pain     Pt to er c/o chronic backpain that is now worse. Hx of back surgery.     37 y.o. female presents to the ED with acute flare up of chronic low back pain.  Patient states she saw her neurosurgeon, Dr. Urena, last Thursday and notes she has been referred to a new pain management physician however has not heard back.  States she was told to come to the ER for any worsening symptoms.  Patient was seen at the [facility] ER twice in 1 day x4 days ago where she received IV pain medication.  Patient denies any new injury or trauma, bowel or bladder incontinence, saddle paresthesia, numbness, tingling, weakness.    Upon review of patient's chart, she has been seen in the ER multiple times for this and has been educated on the chronic pain policy numerous times.  Discussed with her that we will not send her home with any narcotic pain medication she will receive 1 p.o. dose here.    The history is provided by the patient. No  was used.   Back Pain   This is a chronic problem. The problem occurs throughout the day. The problem has been unchanged. The pain is associated with no known injury. The pain is present in the lumbar spine. The quality of the pain is described as shooting. The pain is The same all the time. Pertinent negatives include no chest pain, no fever, no bowel incontinence, no bladder incontinence, no dysuria, no paresthesias, no paresis and no weakness. Risk factors include obesity.   Review of patient's allergies indicates:   Allergen Reactions    Nsaids (non-steroidal anti-inflammatory drug)      Hx of bariatric sx     Past Medical History:   Diagnosis Date    Chronic back pain     H/O gastric sleeve      Past Surgical History:   Procedure Laterality Date    abominal plasty      BACK SURGERY      BARIATRIC SURGERY      gastric sleeve      INJECTION OF FACET JOINT Bilateral 8/2/2022    Procedure:  bilateral lumbar facet injection L4-5 and L5-S1 (Iv Sedation);  Surgeon: Robert Reynolds Jr., MD;  [facility];  Service: Pain Management;  Laterality: Bilateral;    SPINAL FUSION       No family history on file.  Social History     Tobacco Use    Smoking status: Never    Smokeless tobacco: Never   Substance Use Topics    Alcohol use: Never     Review of Systems   Constitutional:  Negative for fever.   HENT:  Negative for sore throat.    Respiratory:  Negative for shortness of breath.    Cardiovascular:  Negative for chest pain.   Gastrointestinal:  Negative for bowel incontinence and nausea.   Genitourinary:  Negative for bladder incontinence and dysuria.   Musculoskeletal:  Positive for back pain.   Skin:  Negative for rash.   Neurological:  Negative for weakness and paresthesias.   Hematological:  Does not bruise/bleed easily.   All other systems reviewed and are negative.    Physical Exam     Initial Vitals [10/10/22 1753]   BP Pulse Resp Temp SpO2   136/80 86 18 97.9 °F (36.6 °C) 100 %      MAP       --         Physical Exam    Nursing note and vitals reviewed.  Constitutional: She appears well-developed and well-nourished.   HENT:   Head: Normocephalic and atraumatic.   Eyes: EOM are normal. Pupils are equal, round, and reactive to light.   Neck: Neck supple.   Cardiovascular:  Normal rate, regular rhythm and normal heart sounds.           Pulmonary/Chest: Breath sounds normal.   Musculoskeletal:      Cervical back: Neck supple.      Lumbar back: Tenderness and bony tenderness present. Decreased range of motion.     Neurological: She is alert and oriented to person, place, and time. She has normal strength. GCS score is 15. GCS eye subscore is 4. GCS verbal subscore is 5. GCS motor subscore is 6.   Skin: Skin is warm and dry.   Psychiatric: She has a normal mood and affect.       ED Course   Procedures  Labs Reviewed - No data to display       Imaging Results    None          Medications    HYDROcodone-acetaminophen  mg per tablet 1 tablet (1 tablet Oral Given 10/10/22 6472)     Medical Decision Making:   Differential Diagnosis:   Chronic low back pain, sciatica, malingering   ED Management:  Discussed with patient that she would receive one PO dose of pain medication here and no prescription to go home with. F/u with neurosurgery regarding pain management referral. Patients friend at bedside, states she would be driving patient home. Left prior to receiving DC paperwork                         Clinical Impression:   Final diagnoses:  [M54.41, M54.42, G89.29] Chronic midline low back pain with bilateral sciatica (Primary)        ED Disposition Condition    Discharge Stable          ED Prescriptions    None       Follow-up Information       Follow up With Specialties Details Why Contact Info    Nic Guzman MD Family Medicine   [address] [place] [address]  [phone]      [facility] - Emergency Dept Emergency Medicine In 1 week If symptoms worsen [id number]  [facility] [id number] [phone]             Jorge Luis Guy PA-C  10/10/22 2018

## 2022-10-11 ENCOUNTER — HOSPITAL ENCOUNTER (EMERGENCY)
Facility: HOSPITAL | Age: 37
Discharge: HOME | End: 2022-10-11
Attending: STUDENT IN AN ORGANIZED HEALTH CARE EDUCATION/TRAINING PROGRAM
Payer: COMMERCIAL

## 2022-10-11 VITALS
RESPIRATION RATE: 18 BRPM | WEIGHT: 285.06 LBS | OXYGEN SATURATION: 100 % | DIASTOLIC BLOOD PRESSURE: 85 MMHG | SYSTOLIC BLOOD PRESSURE: 126 MMHG | BODY MASS INDEX: 48.67 KG/M2 | HEART RATE: 70 BPM | HEIGHT: 64 IN | TEMPERATURE: 97 F

## 2022-10-11 DIAGNOSIS — M54.41 CHRONIC BILATERAL LOW BACK PAIN WITH BILATERAL SCIATICA: Primary | ICD-10-CM

## 2022-10-11 DIAGNOSIS — G89.29 CHRONIC BILATERAL LOW BACK PAIN WITH BILATERAL SCIATICA: Primary | ICD-10-CM

## 2022-10-11 DIAGNOSIS — M54.42 CHRONIC BILATERAL LOW BACK PAIN WITH BILATERAL SCIATICA: Primary | ICD-10-CM

## 2022-10-11 PROCEDURE — 99284 EMERGENCY DEPT VISIT MOD MDM: CPT | Mod: 25

## 2022-10-11 PROCEDURE — 63600175 PHARM REV CODE 636 W HCPCS: Performed by: STUDENT IN AN ORGANIZED HEALTH CARE EDUCATION/TRAINING PROGRAM

## 2022-10-11 PROCEDURE — 96372 THER/PROPH/DIAG INJ SC/IM: CPT | Performed by: STUDENT IN AN ORGANIZED HEALTH CARE EDUCATION/TRAINING PROGRAM

## 2022-10-11 RX ORDER — MORPHINE SULFATE 2 MG/ML
4 INJECTION, SOLUTION INTRAMUSCULAR; INTRAVENOUS
Status: DISCONTINUED | OUTPATIENT
Start: 2022-10-11 | End: 2022-10-11

## 2022-10-11 RX ORDER — PREDNISONE 20 MG/1
40 TABLET ORAL DAILY
Qty: 10 TABLET | Refills: 0 | Status: SHIPPED | OUTPATIENT
Start: 2022-10-11 | End: 2022-10-16

## 2022-10-11 RX ORDER — HYDROCODONE BITARTRATE AND ACETAMINOPHEN 5; 325 MG/1; MG/1
1 TABLET ORAL EVERY 4 HOURS PRN
Qty: 18 TABLET | Refills: 0 | Status: SHIPPED | OUTPATIENT
Start: 2022-10-11 | End: 2022-10-23

## 2022-10-11 RX ORDER — KETOROLAC TROMETHAMINE 10 MG/1
10 TABLET, FILM COATED ORAL EVERY 6 HOURS
Qty: 20 TABLET | Refills: 0 | Status: SHIPPED | OUTPATIENT
Start: 2022-10-11 | End: 2022-10-16

## 2022-10-11 RX ORDER — MORPHINE SULFATE 2 MG/ML
4 INJECTION, SOLUTION INTRAMUSCULAR; INTRAVENOUS
Status: COMPLETED | OUTPATIENT
Start: 2022-10-11 | End: 2022-10-11

## 2022-10-11 RX ORDER — KETOROLAC TROMETHAMINE 30 MG/ML
15 INJECTION, SOLUTION INTRAMUSCULAR; INTRAVENOUS
Status: COMPLETED | OUTPATIENT
Start: 2022-10-11 | End: 2022-10-11

## 2022-10-11 RX ORDER — METHYLPREDNISOLONE SOD SUCC 125 MG
125 VIAL (EA) INJECTION
Status: COMPLETED | OUTPATIENT
Start: 2022-10-11 | End: 2022-10-11

## 2022-10-11 RX ADMIN — KETOROLAC TROMETHAMINE 15 MG: 30 INJECTION, SOLUTION INTRAMUSCULAR at 10:10

## 2022-10-11 RX ADMIN — METHYLPREDNISOLONE SODIUM SUCCINATE 125 MG: 125 INJECTION, POWDER, FOR SOLUTION INTRAMUSCULAR; INTRAVENOUS at 10:10

## 2022-10-11 RX ADMIN — MORPHINE SULFATE 4 MG: 2 INJECTION, SOLUTION INTRAMUSCULAR; INTRAVENOUS at 10:10

## 2022-10-11 NOTE — ED PROVIDER NOTES
Encounter Date: 10/10/2022       History     Chief Complaint   Patient presents with    Back Pain     C/o lower back pain, states hx of fusion in March, states has had flare ups like this before, was seen at [facility] today and dc home.     37-year-old white female presents to the emergency department with chronic low back pain.  She has states that she fell on her tailbone earlier today and went to [facility] of was told was going to be 15 hours so she left and went to [facility] where she was given a [place] tablet and sent home but patient did not go home she came straight to this emergency department    Review of patient's allergies indicates:   Allergen Reactions    Nsaids (non-steroidal anti-inflammatory drug) Other (See Comments)     Hx of bariatric sx was told not to take oral nsaids     Past Medical History:   Diagnosis Date    Chronic back pain     H/O gastric sleeve      Past Surgical History:   Procedure Laterality Date    abominal plasty      BACK SURGERY      BARIATRIC SURGERY      gastric sleeve      INJECTION OF FACET JOINT Bilateral 8/2/2022    Procedure: bilateral lumbar facet injection L4-5 and L5-S1 (Iv Sedation);  Surgeon: Robert Reynolds Jr., MD;  Location: [facility];  Service: Pain Management;  Laterality: Bilateral;    SPINAL FUSION       History reviewed. No pertinent family history.  Social History     Tobacco Use    Smoking status: Never    Smokeless tobacco: Never   Substance Use Topics    Alcohol use: Never     Review of Systems   Constitutional: Negative.  Negative for activity change, appetite change, chills, diaphoresis, fatigue, fever and unexpected weight change.   HENT: Negative.  Negative for congestion, dental problem, drooling, ear discharge, ear pain, facial swelling, hearing loss, mouth sores, nosebleeds, postnasal drip, rhinorrhea, sinus pressure, sinus pain, sneezing, sore throat, tinnitus, trouble swallowing and voice change.    Eyes: Negative.   Negative for photophobia, pain, discharge, redness, itching and visual disturbance.   Respiratory: Negative.  Negative for apnea, cough, choking, chest tightness, shortness of breath, wheezing and stridor.    Cardiovascular: Negative.  Negative for chest pain, palpitations and leg swelling.   Gastrointestinal: Negative.  Negative for abdominal distention, abdominal pain, anal bleeding, blood in stool, constipation, diarrhea, nausea, rectal pain and vomiting.   Endocrine: Negative.  Negative for cold intolerance, heat intolerance, polydipsia, polyphagia and polyuria.   Genitourinary: Negative.  Negative for decreased urine volume, difficulty urinating, dyspareunia, dysuria, enuresis, flank pain, frequency, genital sores, hematuria, menstrual problem, pelvic pain, urgency, vaginal bleeding, vaginal discharge and vaginal pain.   Musculoskeletal:  Positive for back pain and myalgias. Negative for arthralgias, gait problem, joint swelling, neck pain and neck stiffness.   Skin: Negative.  Negative for color change, pallor, rash and wound.   Allergic/Immunologic: Negative.  Negative for environmental allergies, food allergies and immunocompromised state.   Neurological: Negative.  Negative for dizziness, tremors, seizures, syncope, facial asymmetry, speech difficulty, weakness, light-headedness, numbness and headaches.   Hematological: Negative.  Negative for adenopathy. Does not bruise/bleed easily.   Psychiatric/Behavioral: Negative.  Negative for agitation, behavioral problems, confusion, decreased concentration, dysphoric mood, hallucinations, self-injury, sleep disturbance and suicidal ideas. The patient is not nervous/anxious and is not hyperactive.    All other systems reviewed and are negative.    Physical Exam     Initial Vitals [10/10/22 2106]   BP Pulse Resp Temp SpO2   123/83 85 16 97.5 °F (36.4 °C) 100 %      MAP       --         Physical Exam    Nursing note and vitals reviewed.  Constitutional: She appears  well-developed and well-nourished. She is not diaphoretic. No distress.   Obese   HENT:   Head: Normocephalic and atraumatic.   Mouth/Throat: Oropharynx is clear and moist. No oropharyngeal exudate.   Eyes: Conjunctivae and EOM are normal. Pupils are equal, round, and reactive to light. No scleral icterus.   Neck: Neck supple. No JVD present.   Normal range of motion.  Cardiovascular:  Normal rate, regular rhythm, normal heart sounds and intact distal pulses.     Exam reveals no gallop and no friction rub.       No murmur heard.  Pulmonary/Chest: Breath sounds normal. No respiratory distress. She has no wheezes. She exhibits no tenderness.   Abdominal: Abdomen is soft. Bowel sounds are normal. She exhibits no distension. There is no abdominal tenderness. There is no rebound.   Musculoskeletal:         General: Normal range of motion.      Cervical back: Normal range of motion and neck supple.      Comments: Tender to paraspinous muscle group of the lumbar spine     Neurological: She is alert and oriented to person, place, and time. She has normal strength and normal reflexes.   Skin: Skin is warm and dry. Capillary refill takes less than 2 seconds.   Psychiatric: She has a normal mood and affect. Her behavior is normal. Judgment and thought content normal.       ED Course   Procedures  Labs Reviewed   URINALYSIS, REFLEX TO URINE CULTURE - Abnormal; Notable for the following components:       Result Value    Appearance, UA Slightly Cloudy (*)     Leukocyte Esterase, UA Trace (*)     All other components within normal limits   DRUG SCREEN, URINE (BEAKER) - Abnormal; Notable for the following components:    Cannabinoids, Urine Positive (*)     All other components within normal limits    Narrative:     Cut off concentrations:    Amphetamines - 1000 ng/ml  Barbiturates - 200 ng/ml  Benzodiazepine - 200 ng/ml  Cannabinoids (THC) - 50 ng/ml  Cocaine - 300 ng/ml  Fentanyl - 1.0 ng/ml  MDMA - 500 ng/ml  Opiates - 300 ng/ml    Phencyclidine (PCP) - 25 ng/ml    Specimen submitted for drug analysis and tested for pH and specific gravity in order to evaluate sample integrity. Suspect tampering if specific gravity is <1.003 and/or pH is not within the range of 4.5 - 8.0  False negatives may result form substances such as bleach added to urine.  False positives may result for the presence of a substance with similar chemical structure to the drug or its metabolite.    This test provides only a PRELIMINARY analytical test result. A more specific alternate chemical method must be used in order to obtain a confirmed analytical result. Gas chromatography/mass spectrometry (GC/MS) is the preferred confirmatory method. Other chemical confirmation methods are available. Clinical consideration and professional judgement should be applied to any drug of abuse test result, particularly when preliminary positive results are used.    Positive results will be confirmed only at the physicians request. Unconfirmed screening results are to be used only for medical purposes (treatment).        URINALYSIS, MICROSCOPIC - Abnormal; Notable for the following components:    Amporphous Urate Crystals, UA Rare (*)     Squamous Epithelial Cells, UA Few (*)     All other components within normal limits   PREGNANCY TEST, URINE RAPID - Normal          Imaging Results    None          Medications   orphenadrine injection 60 mg (60 mg Intramuscular Given 10/10/22 2312)   ketorolac injection 60 mg (60 mg Intramuscular Given 10/10/22 2311)                              Clinical Impression:   Final diagnoses:  [S39.012S] Strain of lumbar region, sequela (Primary)  [M54.40, G89.29] Chronic low back pain with sciatica, sciatica laterality unspecified, unspecified back pain laterality  [M62.838] Muscle spasm      ED Disposition Condition    Discharge Stable          ED Prescriptions    None       Follow-up Information       Follow up With Specialties Details Why Contact Info     LAST Guzman MD Family Medicine In 2 days  [address] [facility] [address]  [phone]            Lashanda Bella is a certified MA and was present during the entire interaction with this patient      Mike Galvan MD  10/10/22 7586

## 2022-10-12 NOTE — ED PROVIDER NOTES
Encounter Date: 10/11/2022       History     Chief Complaint   Patient presents with    Back Pain     Recurrent lower back pain, s/p lumbar fusion 03/2022     37-year-old female presents to ED for acute on chronic back pain. states she has struggled with weight gain in the past. Has had gastric surgery then back surgery.  States since this time she has had chronic discomfort in the lumbar region.  States she is working with her surgeon to get her an appointment with pain management.  Has not seen her PCP recently. states the pain is in the bilateral lumbar region. endorses intermittent pain / radiation down to her hamstrings but not past the knees.  No distal numbness or weakness.  Reports intact distal sensation.  No perianal symptoms.  No incontinence of urine or stool.  Denies any new or worsening midline back pain. has no other complaints or concerns at this time.    Review of patient's allergies indicates:   Allergen Reactions    Nsaids (non-steroidal anti-inflammatory drug) Other (See Comments)     Hx of bariatric sx was told not to take oral nsaids     Past Medical History:   Diagnosis Date    Chronic back pain     H/O gastric sleeve      Past Surgical History:   Procedure Laterality Date    abominal plasty      BACK SURGERY      BARIATRIC SURGERY      gastric sleeve      INJECTION OF FACET JOINT Bilateral 08/02/2022    Procedure: bilateral lumbar facet injection L4-5 and L5-S1 (Iv Sedation);  Surgeon: Robert Reynolds Jr., MD;  Location: [facility];  Service: Pain Management;  Laterality: Bilateral;    SPINAL FUSION       No family history on file.  Social History     Tobacco Use    Smoking status: Never    Smokeless tobacco: Never   Substance Use Topics    Alcohol use: Never    Drug use: Never     Review of Systems   Constitutional:  Negative for chills, diaphoresis and fever.   HENT:  Negative for congestion, rhinorrhea, sinus pain and sore throat.    Eyes:  Negative for pain, discharge  and itching.   Respiratory:  Negative for cough, chest tightness and shortness of breath.    Cardiovascular:  Negative for chest pain and palpitations.   Gastrointestinal:  Negative for abdominal pain, nausea and vomiting.   Genitourinary:  Negative for difficulty urinating, dysuria, flank pain and hematuria.   Musculoskeletal:  Positive for back pain. Negative for myalgias.   Skin:  Negative for color change and rash.   Neurological:  Negative for dizziness, weakness and headaches.   Psychiatric/Behavioral:  Negative for confusion. The patient is not hyperactive.      Physical Exam     Initial Vitals [10/11/22 2037]   BP Pulse Resp Temp SpO2   126/85 70 20 97.2 °F (36.2 °C) 100 %      MAP       --         Physical Exam    Vitals reviewed.  Constitutional: She appears well-developed and well-nourished. She is not diaphoretic. No distress.   HENT:   Head: Normocephalic and atraumatic.   Eyes: Conjunctivae and EOM are normal. Pupils are equal, round, and reactive to light.   Neck: Neck supple. No tracheal deviation present.   Normal range of motion.  Cardiovascular:  Normal rate, regular rhythm, normal heart sounds and intact distal pulses.           Pulmonary/Chest: Breath sounds normal. No respiratory distress.   Abdominal: Abdomen is soft. There is no abdominal tenderness. There is no rebound and no guarding.   Musculoskeletal:         General: Normal range of motion.      Cervical back: Normal, normal range of motion and neck supple.      Thoracic back: Normal.      Lumbar back: Spasms and tenderness present. No signs of trauma or bony tenderness. Normal range of motion.      Comments: Has generalized discomfort with palpation of the lower back musculature.  Obvious spasms present. No midline back pain.  No evidence of trauma. has normal range of motion of bilateral lower extremities.  Intact patellar reflexes.  Excellent pedal strength and sensation in all dermatomes of the feet.  Has normal strength of the hip  flexors. no other acute findings.     Neurological: She is alert and oriented to person, place, and time. She has normal strength. GCS score is 15. GCS eye subscore is 4. GCS verbal subscore is 5. GCS motor subscore is 6.   Skin: Skin is warm and dry. Capillary refill takes less than 2 seconds. No rash noted.   Psychiatric: She has a normal mood and affect. Her behavior is normal. Judgment and thought content normal.       ED Course   Procedures  Labs Reviewed - No data to display       Imaging Results    None          Medications   ketorolac injection 15 mg (15 mg Intramuscular Given 10/11/22 2244)   methylPREDNISolone sodium succinate injection 125 mg (125 mg Intramuscular Given 10/11/22 2246)   morphine injection 4 mg (4 mg Intramuscular Given 10/11/22 2246)     Medical Decision Making:   ED Management:  37-year-old female presents for persistent lower back pain.  Has a back surgeon and is awaiting pain management specialist appointment.  Denies any acute precipitous worsening, no new trauma.  No symptoms reported of cauda equina.  On exam NAD.  Patient ambulatory.  Significant other bedside. VSS. Has obvious muscle spasms and discomfort of her lower back. Lower extremity assessment unremarkable without evidence of acute cord compression, no weakness, no paralysis, no sensory deficits.  Patient describing sciatic component down to posterior knees.  For this reason provided steroids and pain medication in department which patient reported relief from.  I did prescribe additional steroids and pain medicine for home. requires very close follow-up with her back specialist and or PCP.  Provided very strict return precautions.  Had extensive conversation with patient about chronic pain management in the ER.  Stated if she did return we would be unable to refill her narcotic prescription. Patient and significant other voiced clear understanding.  Discharged stable. ([place])                        Clinical Impression:    Final diagnoses:  [M54.42, M54.41, G89.29] Chronic bilateral low back pain with bilateral sciatica (Primary)        ED Disposition Condition    Discharge Stable          ED Prescriptions       Medication Sig Dispense Start Date End Date Auth. Provider    ketorolac (TORADOL) 10 mg tablet () Take 1 tablet (10 mg total) by mouth every 6 (six) hours. for 5 days 20 tablet 10/11/2022 10/16/2022 Helio Rocha MD    predniSONE (DELTASONE) 20 MG tablet () Take 2 tablets (40 mg total) by mouth once daily. for 5 days 10 tablet 10/11/2022 10/16/2022 Helio Rocha MD    HYDROcodone-acetaminophen (NORCO) 5-325 mg per tablet () Take 1 tablet by mouth every 4 (four) hours as needed for Pain. 18 tablet 10/11/2022 10/23/2022 Helio Rocha MD          Follow-up Information       Follow up With Specialties Details Why Contact Info    [facility] - Emergency Dept Emergency Medicine  As needed, If symptoms worsen [address] [facility] [id number] [phone]    PCP  Schedule an appointment as soon as possible for a visit in 1 week               Helio Rocha MD  22 0108

## 2022-10-18 DIAGNOSIS — D50.9 IDA (IRON DEFICIENCY ANEMIA): Primary | ICD-10-CM

## 2022-10-23 ENCOUNTER — HOSPITAL ENCOUNTER (EMERGENCY)
Facility: HOSPITAL | Age: 37
Discharge: HOME | End: 2022-10-23
Attending: INTERNAL MEDICINE
Payer: COMMERCIAL

## 2022-10-23 VITALS
DIASTOLIC BLOOD PRESSURE: 74 MMHG | SYSTOLIC BLOOD PRESSURE: 121 MMHG | RESPIRATION RATE: 16 BRPM | WEIGHT: 276.69 LBS | OXYGEN SATURATION: 100 % | HEART RATE: 92 BPM | TEMPERATURE: 99 F | HEIGHT: 64 IN | BODY MASS INDEX: 47.24 KG/M2

## 2022-10-23 DIAGNOSIS — G89.29 CHRONIC BILATERAL LOW BACK PAIN WITH BILATERAL SCIATICA: Primary | ICD-10-CM

## 2022-10-23 DIAGNOSIS — M54.41 CHRONIC BILATERAL LOW BACK PAIN WITH BILATERAL SCIATICA: Primary | ICD-10-CM

## 2022-10-23 DIAGNOSIS — M54.42 CHRONIC BILATERAL LOW BACK PAIN WITH BILATERAL SCIATICA: Primary | ICD-10-CM

## 2022-10-23 LAB
APPEARANCE UR: CLEAR
B-HCG UR QL: NEGATIVE
BACTERIA #/AREA URNS AUTO: ABNORMAL /HPF
BILIRUB UR QL STRIP.AUTO: NEGATIVE MG/DL
COLOR UR AUTO: YELLOW
CTP QC/QA: YES
GLUCOSE UR QL STRIP.AUTO: NORMAL MG/DL
HYALINE CASTS #/AREA URNS LPF: ABNORMAL /LPF
KETONES UR QL STRIP.AUTO: ABNORMAL MG/DL
LEUKOCYTE ESTERASE UR QL STRIP.AUTO: NEGATIVE UNIT/L
MUCOUS THREADS URNS QL MICRO: ABNORMAL /LPF
NITRITE UR QL STRIP.AUTO: NEGATIVE
PH UR STRIP.AUTO: 5.5 [PH]
PROT UR QL STRIP.AUTO: ABNORMAL MG/DL
RBC #/AREA URNS AUTO: ABNORMAL /HPF
RBC UR QL AUTO: NEGATIVE UNIT/L
SP GR UR STRIP.AUTO: 1.04
SQUAMOUS #/AREA URNS LPF: ABNORMAL /HPF
UROBILINOGEN UR STRIP-ACNC: NORMAL MG/DL
WBC #/AREA URNS AUTO: ABNORMAL /HPF

## 2022-10-23 PROCEDURE — 81025 URINE PREGNANCY TEST: CPT | Performed by: NURSE PRACTITIONER

## 2022-10-23 PROCEDURE — 96372 THER/PROPH/DIAG INJ SC/IM: CPT | Performed by: NURSE PRACTITIONER

## 2022-10-23 PROCEDURE — 63600175 PHARM REV CODE 636 W HCPCS: Performed by: NURSE PRACTITIONER

## 2022-10-23 PROCEDURE — 99284 EMERGENCY DEPT VISIT MOD MDM: CPT | Mod: 25

## 2022-10-23 PROCEDURE — 81001 URINALYSIS AUTO W/SCOPE: CPT | Performed by: NURSE PRACTITIONER

## 2022-10-23 RX ORDER — METHOCARBAMOL 100 MG/ML
300 INJECTION, SOLUTION INTRAMUSCULAR; INTRAVENOUS
Status: COMPLETED | OUTPATIENT
Start: 2022-10-23 | End: 2022-10-23

## 2022-10-23 RX ORDER — BACLOFEN 10 MG/1
10 TABLET ORAL 3 TIMES DAILY PRN
Qty: 30 TABLET | Refills: 0 | Status: SHIPPED | OUTPATIENT
Start: 2022-10-23 | End: 2023-07-13 | Stop reason: ALTCHOICE

## 2022-10-23 RX ORDER — KETOROLAC TROMETHAMINE 30 MG/ML
30 INJECTION, SOLUTION INTRAMUSCULAR; INTRAVENOUS
Status: COMPLETED | OUTPATIENT
Start: 2022-10-23 | End: 2022-10-23

## 2022-10-23 RX ORDER — OXYCODONE AND ACETAMINOPHEN 5; 325 MG/1; MG/1
1 TABLET ORAL EVERY 8 HOURS PRN
Qty: 12 TABLET | Refills: 0 | Status: SHIPPED | OUTPATIENT
Start: 2022-10-23 | End: 2022-10-27

## 2022-10-23 RX ADMIN — METHOCARBAMOL 300 MG: 100 INJECTION, SOLUTION INTRAMUSCULAR; INTRAVENOUS at 05:10

## 2022-10-23 RX ADMIN — KETOROLAC TROMETHAMINE 30 MG: 30 INJECTION, SOLUTION INTRAMUSCULAR at 05:10

## 2022-10-23 NOTE — ED PROVIDER NOTES
Encounter Date: 10/23/2022       History     Chief Complaint   Patient presents with    Back Pain     Back pain ongoing x 4 weeks; reports previous Rx did not help. Pain radiating to both legs with numbness. Hx L5S1 fusion.     Pt is a 37 y.o. female who presents to the [facility] ED complaining of lower back pain that radiates into her bilateral legs. Reports symptoms have been intermittent x 2 weeks. Denies chest pain< SOB, weakness, dizziness, fever, or loss of bowel or bladder control. Pt with HX of lumbar surgery as well as a bariatric surgery. Admits the pain to her back has been intermittent since the surgery occurred. Currently awaiting follow up with pain management specialist. Denies recent trauma to back.    Review of patient's allergies indicates:   Allergen Reactions    Nsaids (non-steroidal anti-inflammatory drug) Other (See Comments)     Hx of bariatric sx was told not to take oral nsaids     Past Medical History:   Diagnosis Date    Chronic back pain     H/O gastric sleeve      Past Surgical History:   Procedure Laterality Date    abominal plasty      BACK SURGERY      BARIATRIC SURGERY      gastric sleeve      INJECTION OF FACET JOINT Bilateral 8/2/2022    Procedure: bilateral lumbar facet injection L4-5 and L5-S1 (Iv Sedation);  Surgeon: Robert Reynolds Jr., MD;  Location: [facility];  Service: Pain Management;  Laterality: Bilateral;    SPINAL FUSION       History reviewed. No pertinent family history.  Social History     Tobacco Use    Smoking status: Never    Smokeless tobacco: Never   Substance Use Topics    Alcohol use: Never     Review of Systems   Constitutional:  Negative for chills, diaphoresis, fatigue and fever.   HENT:  Negative for facial swelling, rhinorrhea, sinus pressure, sinus pain, sore throat and trouble swallowing.    Respiratory:  Negative for cough, chest tightness, shortness of breath and wheezing.    Cardiovascular:  Negative for chest pain, palpitations and leg swelling.    Gastrointestinal:  Negative for abdominal pain, diarrhea, nausea and vomiting.   Genitourinary:  Negative for dysuria, flank pain, frequency, hematuria and urgency.   Musculoskeletal:  Positive for back pain. Negative for arthralgias, joint swelling and myalgias.   Skin:  Negative for color change and rash.   Neurological:  Negative for dizziness, syncope, weakness and light-headedness.   Hematological:  Does not bruise/bleed easily.   All other systems reviewed and are negative.    Physical Exam     Initial Vitals [10/23/22 1700]   BP Pulse Resp Temp SpO2   121/74 92 16 98.6 °F (37 °C) 100 %      MAP       --         Physical Exam    Nursing note and vitals reviewed.  Constitutional: She appears well-developed and well-nourished.   HENT:   Head: Normocephalic and atraumatic.   Nose: Nose normal.   Mouth/Throat: Oropharynx is clear and moist.   Eyes: Conjunctivae and EOM are normal. Pupils are equal, round, and reactive to light.   Neck: Neck supple.   Normal range of motion.  Cardiovascular:  Normal rate, regular rhythm, normal heart sounds and intact distal pulses.           Pulmonary/Chest: Effort normal and breath sounds normal. No respiratory distress. She has no wheezes. She has no rhonchi. She has no rales. She exhibits no tenderness.   Abdominal: Abdomen is soft and flat. Bowel sounds are normal. She exhibits no distension. There is no abdominal tenderness. There is no rebound, no guarding, no tenderness at McBurney's point and negative Ledbettre's sign. negative psoas sign  Musculoskeletal:         General: Normal range of motion.      Cervical back: Normal range of motion and neck supple.      Lumbar back: Spasms and tenderness present. No swelling, edema or deformity. Normal range of motion.        Back:       Comments: Muscle spasms present along bilateral portion of lumbar spine and sacrum.Tenderness present along lateral aspect of bilateral leg. Muscle spasms present to bilateral gluteals.        Neurological: She is alert and oriented to person, place, and time. She has normal strength and normal reflexes.   Skin: Skin is warm and dry. Capillary refill takes less than 2 seconds.   Psychiatric: She has a normal mood and affect. Her speech is normal and behavior is normal. Judgment and thought content normal.       ED Course   Procedures  Labs Reviewed   URINALYSIS, REFLEX TO URINE CULTURE - Abnormal; Notable for the following components:       Result Value    Protein, UA Trace (*)     Ketones, UA Trace (*)     Squamous Epithelial Cells, UA Trace (*)     Mucous, UA Occ (*)     All other components within normal limits   POCT URINE PREGNANCY          Imaging Results              X-Ray Lumbar Spine Ap And Lateral (Preliminary result)  Result time 10/23/22 18:10:39      ED Interpretation by PHILIPPE Glover Jr. (10/23/22 18:10:39, [facility], Emergency Medicine)    Postoperative disc spacer noted L5-S1. No acute fractures visualized. Degenerative changes noted.                                     Medications   ketorolac injection 30 mg (30 mg Intramuscular Given 10/23/22 1739)   methocarbamoL injection 300 mg (300 mg Intramuscular Given 10/23/22 1739)     Medical Decision Making:   Differential Diagnosis:   Sciatica  Fracture  DDD  UTI  Clinical Tests:   Lab Tests: Ordered and Reviewed  Radiological Study: Ordered and Reviewed                        Clinical Impression:   Final diagnoses:  [M54.42, M54.41, G89.29] Chronic bilateral low back pain with bilateral sciatica (Primary)      ED Disposition Condition    Discharge Stable          ED Prescriptions       Medication Sig Dispense Start Date End Date Auth. Provider    baclofen (LIORESAL) 10 MG tablet Take 1 tablet (10 mg total) by mouth 3 (three) times daily as needed (muscle spasms). 30 tablet 10/23/2022 11/2/2022 PHILIPPE Glover Jr.    oxyCODONE-acetaminophen (PERCOCET) 5-325 mg per tablet Take 1 tablet by mouth every 8  (eight) hours as needed for Pain. 12 tablet 10/23/2022 10/27/2022 Marlon Mabry Jr., SINDYP          Follow-up Information       Follow up With Specialties Details Why Contact Info    Riana Urena MD Neurosurgery In 1 week  [address] [facility] [id number] [phone]      [facility] Emergency Medicine In 3 days As needed, If symptoms worsen [address] [facility] [id number] [phone]             Marlon Mabry Jr., SINDYP  10/23/22 1821

## 2022-10-27 ENCOUNTER — HOSPITAL ENCOUNTER (EMERGENCY)
Facility: HOSPITAL | Age: 37
Discharge: HOME | End: 2022-10-27
Attending: INTERNAL MEDICINE
Payer: COMMERCIAL

## 2022-10-27 VITALS
WEIGHT: 281.5 LBS | BODY MASS INDEX: 46.9 KG/M2 | OXYGEN SATURATION: 100 % | RESPIRATION RATE: 16 BRPM | DIASTOLIC BLOOD PRESSURE: 84 MMHG | HEART RATE: 71 BPM | HEIGHT: 65 IN | TEMPERATURE: 97 F | SYSTOLIC BLOOD PRESSURE: 126 MMHG

## 2022-10-27 DIAGNOSIS — G89.29 CHRONIC RADICULAR PAIN OF LOWER BACK: Primary | ICD-10-CM

## 2022-10-27 DIAGNOSIS — M54.16 CHRONIC RADICULAR PAIN OF LOWER BACK: Primary | ICD-10-CM

## 2022-10-27 DIAGNOSIS — M62.830 BACK SPASM: ICD-10-CM

## 2022-10-27 PROCEDURE — 99284 EMERGENCY DEPT VISIT MOD MDM: CPT

## 2022-10-27 PROCEDURE — 63600175 PHARM REV CODE 636 W HCPCS: Performed by: PHYSICIAN ASSISTANT

## 2022-10-27 PROCEDURE — 25000003 PHARM REV CODE 250: Performed by: PHYSICIAN ASSISTANT

## 2022-10-27 PROCEDURE — 96372 THER/PROPH/DIAG INJ SC/IM: CPT | Performed by: PHYSICIAN ASSISTANT

## 2022-10-27 RX ORDER — METHOCARBAMOL 500 MG/1
500 TABLET, FILM COATED ORAL 3 TIMES DAILY
Qty: 30 TABLET | Refills: 0 | Status: SHIPPED | OUTPATIENT
Start: 2022-10-27 | End: 2022-11-01

## 2022-10-27 RX ORDER — OXYCODONE HYDROCHLORIDE 5 MG/1
5 TABLET ORAL ONCE
Status: DISCONTINUED | OUTPATIENT
Start: 2022-10-27 | End: 2022-10-27

## 2022-10-27 RX ORDER — PREDNISONE 20 MG/1
20 TABLET ORAL DAILY
Qty: 5 TABLET | Refills: 0 | Status: SHIPPED | OUTPATIENT
Start: 2022-10-27 | End: 2022-11-01

## 2022-10-27 RX ORDER — METHOCARBAMOL 100 MG/ML
500 INJECTION, SOLUTION INTRAMUSCULAR; INTRAVENOUS
Status: COMPLETED | OUTPATIENT
Start: 2022-10-27 | End: 2022-10-27

## 2022-10-27 RX ORDER — OXYCODONE HYDROCHLORIDE 5 MG/1
5 TABLET ORAL ONCE
Status: COMPLETED | OUTPATIENT
Start: 2022-10-27 | End: 2022-10-27

## 2022-10-27 RX ADMIN — METHOCARBAMOL 500 MG: 100 INJECTION, SOLUTION INTRAMUSCULAR; INTRAVENOUS at 07:10

## 2022-10-27 RX ADMIN — OXYCODONE HYDROCHLORIDE 5 MG: 5 TABLET ORAL at 07:10

## 2022-10-27 NOTE — Clinical Note
"Carol Crespo" Mayito was seen and treated in our emergency department on 10/27/2022.  She may return to work on 10/28/2022.       If you have any questions or concerns, please don't hesitate to call.      FARNAZ Romero RN    "

## 2022-10-28 PROCEDURE — 99283 EMERGENCY DEPT VISIT LOW MDM: CPT

## 2022-10-28 NOTE — DISCHARGE INSTRUCTIONS
Take medications as prescribed as needed for pain with food and plenty of water.   Apply heating pad to sore muscles ( being careful not to burn skin).  Soak in warm epsom salt baths for 20-30 minutes daily to help with sore and aching muscles.  Avoid lifting heavy.   Follow up with pain management within 1 week for narcotic pain medication.  Follow up with Neurosurgery as planned.

## 2022-10-28 NOTE — ED PROVIDER NOTES
Encounter Date: 10/27/2022       History     Chief Complaint   Patient presents with    Back Pain     Chronic lower back pain     37-year-old female presents to the emergency department with continued lower back pain that is radiating to her lower extremities bilaterally.  She states she had spinal fusion March 2022 without any relief of pain and rates her pain 10/10.   She states that her neurosurgeon sent a referral to pain management physician however they have not scheduled an appointment.  She claims she did call the pain management clinic and the have received her referral and will call her to schedule an appointment.  Patient has been seen in the emergency department a few times this month and prescribed narcotic pain medication.  She's had a recent lumbar x-ray and CT.  She denies recent injury, just needs help with the pain.  She denies numbness, tingling, bladder or bowel incontinence.      The history is provided by the patient. No  was used.   Back Pain   This is a chronic problem. Illness onset: Months of acute on chronic lower back pain. The problem occurs throughout the day. The problem has been unchanged. Associated with: No recent injury. The pain is present in the lumbar spine. The quality of the pain is described as shooting. The pain radiates to the left leg and right leg. The pain is at a severity of 10/10. Pertinent negatives include no chest pain, no fever, no headaches, no abdominal pain, no dysuria and no weakness. She has tried muscle relaxants and analgesics for the symptoms. The treatment provided mild relief.   Review of patient's allergies indicates:   Allergen Reactions    Nsaids (non-steroidal anti-inflammatory drug) Other (See Comments)     Hx of bariatric sx was told not to take oral nsaids     Past Medical History:   Diagnosis Date    Chronic back pain     H/O gastric sleeve      Past Surgical History:   Procedure Laterality Date    abominal plasty      BACK  SURGERY      BARIATRIC SURGERY      gastric sleeve      INJECTION OF FACET JOINT Bilateral 8/2/2022    Procedure: bilateral lumbar facet injection L4-5 and L5-S1 (Iv Sedation);  Surgeon: Robert Reynolds Jr., MD;  Location: [facility];  Service: Pain Management;  Laterality: Bilateral;    SPINAL FUSION       No family history on file.  Social History     Tobacco Use    Smoking status: Never    Smokeless tobacco: Never   Substance Use Topics    Alcohol use: Never     Review of Systems   Constitutional:  Negative for chills and fever.   HENT:  Negative for sore throat.    Respiratory:  Negative for cough and shortness of breath.    Cardiovascular:  Negative for chest pain, palpitations and leg swelling.   Gastrointestinal:  Negative for abdominal pain, diarrhea, nausea and vomiting.   Genitourinary:  Negative for dysuria.   Musculoskeletal:  Positive for back pain (Bilateral lower, radiating to bilateral lower extremity). Negative for neck pain.   Skin:  Negative for rash and wound.   Neurological:  Negative for dizziness, weakness, light-headedness and headaches.   Hematological:  Does not bruise/bleed easily.     Physical Exam     Initial Vitals [10/27/22 1901]   BP Pulse Resp Temp SpO2   126/82 78 20 97 °F (36.1 °C) 100 %      MAP       --         Physical Exam    Nursing note and vitals reviewed.  Constitutional: She appears well-developed and well-nourished.   HENT:   Head: Normocephalic and atraumatic.   Nose: Nose normal.   Mouth/Throat: Oropharynx is clear and moist.   Eyes: Conjunctivae are normal.   Neck: Neck supple.   Normal range of motion.  Cardiovascular:  Normal rate, normal heart sounds and intact distal pulses.           Pulmonary/Chest: Breath sounds normal.   Abdominal: Abdomen is soft. Bowel sounds are normal. There is no abdominal tenderness. There is no rebound and no guarding.   Musculoskeletal:         General: Normal range of motion.      Cervical back: Normal range of motion and neck  supple. No bony tenderness.      Thoracic back: No bony tenderness.      Lumbar back: Spasms and tenderness (Muscular) present. No bony tenderness.     Lymphadenopathy:     She has no cervical adenopathy.   Neurological: She is alert and oriented to person, place, and time. GCS score is 15. GCS eye subscore is 4. GCS verbal subscore is 5. GCS motor subscore is 6.   Skin: Skin is warm. Capillary refill takes less than 2 seconds.       ED Course   Procedures  Labs Reviewed - No data to display       Imaging Results    None          Medications   methocarbamoL injection 500 mg (500 mg Intramuscular Given 10/27/22 1950)   oxyCODONE immediate release tablet 5 mg (5 mg Oral Given 10/27/22 1950)     Medical Decision Making:   History:   Old Records Summarized: other records.       <> Summary of Records: XR: lumbar spine (10/23/22):  Status post anterior fusion at L5-S1.  No evidence for fracture deformity  CT lumbar spine:(9/11/22) :Lumbar degenerative disc disease and spondylosis level by level discussed above.    ED Management:  The patient is resting comfortably and in no acute distress.  She states that her symptoms have improved after treatment in Emergency Department. I personally discussed her test results and treatment plan.  Gave strict ED precautions, discussed specific conditions for return to the emergency department and importance of follow up with her primary care provider, her neurosurgeon and pain management.   Pain management has received her referral and will call her to schedule an apt ( per patient).  I will not refill narcotic pain medication for chronic pain due to recent narcotic pain medication prescribed from the ED.   Patient voices understanding and agrees to the plan discussed. All patients' questions have been answered at this time.   She has remained hemodynamically stable throughout entire stay in ED and is stable for discharge home.                          Clinical Impression:   Final  diagnoses:  [M54.16, G89.29] Chronic radicular pain of lower back (Primary)  [M62.830] Back spasm        ED Disposition Condition    Discharge Stable          ED Prescriptions       Medication Sig Dispense Start Date End Date Auth. Provider    methocarbamoL (ROBAXIN) 500 MG Tab Take 1 tablet (500 mg total) by mouth 3 (three) times daily. for 5 days 30 tablet 10/27/2022 11/1/2022 ISAI Greene    predniSONE (DELTASONE) 20 MG tablet Take 1 tablet (20 mg total) by mouth once daily. for 5 days 5 tablet 10/27/2022 11/1/2022 ISAI Greene          Follow-up Information       Follow up With Specialties Details Why Contact Info    [facility] Emergency Medicine  As needed, If symptoms worsen [address] [facility] [id number] [phone]             ISAI Greene  10/28/22 0022

## 2022-10-29 ENCOUNTER — HOSPITAL ENCOUNTER (EMERGENCY)
Facility: HOSPITAL | Age: 37
Discharge: HOME | End: 2022-10-29
Attending: EMERGENCY MEDICINE
Payer: COMMERCIAL

## 2022-10-29 ENCOUNTER — HOSPITAL ENCOUNTER (EMERGENCY)
Facility: HOSPITAL | Age: 37
Discharge: HOME | End: 2022-10-29
Attending: STUDENT IN AN ORGANIZED HEALTH CARE EDUCATION/TRAINING PROGRAM
Payer: COMMERCIAL

## 2022-10-29 VITALS
SYSTOLIC BLOOD PRESSURE: 145 MMHG | HEIGHT: 64 IN | DIASTOLIC BLOOD PRESSURE: 77 MMHG | WEIGHT: 282.44 LBS | OXYGEN SATURATION: 100 % | BODY MASS INDEX: 48.22 KG/M2 | TEMPERATURE: 98 F | RESPIRATION RATE: 16 BRPM | HEART RATE: 94 BPM

## 2022-10-29 VITALS
SYSTOLIC BLOOD PRESSURE: 117 MMHG | HEART RATE: 68 BPM | BODY MASS INDEX: 48.06 KG/M2 | TEMPERATURE: 98 F | OXYGEN SATURATION: 100 % | DIASTOLIC BLOOD PRESSURE: 61 MMHG | WEIGHT: 280 LBS | RESPIRATION RATE: 20 BRPM

## 2022-10-29 VITALS
DIASTOLIC BLOOD PRESSURE: 89 MMHG | OXYGEN SATURATION: 96 % | BODY MASS INDEX: 47.8 KG/M2 | HEIGHT: 64 IN | SYSTOLIC BLOOD PRESSURE: 134 MMHG | RESPIRATION RATE: 18 BRPM | WEIGHT: 280 LBS | TEMPERATURE: 99 F | HEART RATE: 84 BPM

## 2022-10-29 DIAGNOSIS — M54.41 CHRONIC BILATERAL LOW BACK PAIN WITH BILATERAL SCIATICA: Primary | ICD-10-CM

## 2022-10-29 DIAGNOSIS — M54.42 CHRONIC BILATERAL LOW BACK PAIN WITH BILATERAL SCIATICA: Primary | ICD-10-CM

## 2022-10-29 DIAGNOSIS — M54.50 CHRONIC LOW BACK PAIN WITHOUT SCIATICA, UNSPECIFIED BACK PAIN LATERALITY: Primary | ICD-10-CM

## 2022-10-29 DIAGNOSIS — G89.29 CHRONIC BILATERAL LOW BACK PAIN WITH BILATERAL SCIATICA: Primary | ICD-10-CM

## 2022-10-29 DIAGNOSIS — M54.50 CHRONIC BILATERAL LOW BACK PAIN WITHOUT SCIATICA: Primary | ICD-10-CM

## 2022-10-29 DIAGNOSIS — G89.29 CHRONIC BILATERAL LOW BACK PAIN WITHOUT SCIATICA: Primary | ICD-10-CM

## 2022-10-29 DIAGNOSIS — G89.29 CHRONIC LOW BACK PAIN WITHOUT SCIATICA, UNSPECIFIED BACK PAIN LATERALITY: Primary | ICD-10-CM

## 2022-10-29 LAB
B-HCG UR QL: NEGATIVE
CTP QC/QA: YES

## 2022-10-29 PROCEDURE — 25000003 PHARM REV CODE 250: Performed by: NURSE PRACTITIONER

## 2022-10-29 PROCEDURE — 63600175 PHARM REV CODE 636 W HCPCS

## 2022-10-29 PROCEDURE — 63600175 PHARM REV CODE 636 W HCPCS: Performed by: NURSE PRACTITIONER

## 2022-10-29 PROCEDURE — 25000003 PHARM REV CODE 250: Performed by: STUDENT IN AN ORGANIZED HEALTH CARE EDUCATION/TRAINING PROGRAM

## 2022-10-29 PROCEDURE — 96372 THER/PROPH/DIAG INJ SC/IM: CPT

## 2022-10-29 PROCEDURE — 96372 THER/PROPH/DIAG INJ SC/IM: CPT | Performed by: NURSE PRACTITIONER

## 2022-10-29 PROCEDURE — 81025 URINE PREGNANCY TEST: CPT | Performed by: NURSE PRACTITIONER

## 2022-10-29 PROCEDURE — 25000003 PHARM REV CODE 250

## 2022-10-29 PROCEDURE — 99285 EMERGENCY DEPT VISIT HI MDM: CPT | Mod: 25,27

## 2022-10-29 PROCEDURE — 99284 EMERGENCY DEPT VISIT MOD MDM: CPT | Mod: 25

## 2022-10-29 RX ORDER — MORPHINE SULFATE 4 MG/ML
4 INJECTION, SOLUTION INTRAMUSCULAR; INTRAVENOUS
Status: COMPLETED | OUTPATIENT
Start: 2022-10-29 | End: 2022-10-29

## 2022-10-29 RX ORDER — ONDANSETRON 4 MG/1
4 TABLET, ORALLY DISINTEGRATING ORAL
Status: COMPLETED | OUTPATIENT
Start: 2022-10-29 | End: 2022-10-29

## 2022-10-29 RX ORDER — KETOROLAC TROMETHAMINE 30 MG/ML
60 INJECTION, SOLUTION INTRAMUSCULAR; INTRAVENOUS ONCE
Status: COMPLETED | OUTPATIENT
Start: 2022-10-29 | End: 2022-10-29

## 2022-10-29 RX ORDER — OXYCODONE AND ACETAMINOPHEN 5; 325 MG/1; MG/1
2 TABLET ORAL
Status: COMPLETED | OUTPATIENT
Start: 2022-10-29 | End: 2022-10-29

## 2022-10-29 RX ORDER — OXYCODONE AND ACETAMINOPHEN 7.5; 325 MG/1; MG/1
1 TABLET ORAL
Status: DISCONTINUED | OUTPATIENT
Start: 2022-10-29 | End: 2022-10-29

## 2022-10-29 RX ORDER — HYDROMORPHONE HYDROCHLORIDE 2 MG/ML
1 INJECTION, SOLUTION INTRAMUSCULAR; INTRAVENOUS; SUBCUTANEOUS
Status: COMPLETED | OUTPATIENT
Start: 2022-10-29 | End: 2022-10-29

## 2022-10-29 RX ORDER — OXYCODONE AND ACETAMINOPHEN 5; 325 MG/1; MG/1
1 TABLET ORAL
Status: COMPLETED | OUTPATIENT
Start: 2022-10-29 | End: 2022-10-29

## 2022-10-29 RX ORDER — OXYCODONE AND ACETAMINOPHEN 10; 325 MG/1; MG/1
1 TABLET ORAL EVERY 6 HOURS PRN
Qty: 16 TABLET | Refills: 0 | OUTPATIENT
Start: 2022-10-29 | End: 2022-11-27

## 2022-10-29 RX ORDER — ORPHENADRINE CITRATE 30 MG/ML
60 INJECTION INTRAMUSCULAR; INTRAVENOUS
Status: COMPLETED | OUTPATIENT
Start: 2022-10-29 | End: 2022-10-29

## 2022-10-29 RX ORDER — GABAPENTIN 300 MG/1
600 CAPSULE ORAL
Status: COMPLETED | OUTPATIENT
Start: 2022-10-29 | End: 2022-10-29

## 2022-10-29 RX ADMIN — OXYCODONE HYDROCHLORIDE AND ACETAMINOPHEN 1 TABLET: 5; 325 TABLET ORAL at 12:10

## 2022-10-29 RX ADMIN — OXYCODONE HYDROCHLORIDE AND ACETAMINOPHEN 2 TABLET: 5; 325 TABLET ORAL at 03:10

## 2022-10-29 RX ADMIN — ONDANSETRON 4 MG: 4 TABLET, ORALLY DISINTEGRATING ORAL at 01:10

## 2022-10-29 RX ADMIN — HYDROMORPHONE HYDROCHLORIDE 1 MG: 2 INJECTION, SOLUTION INTRAMUSCULAR; INTRAVENOUS; SUBCUTANEOUS at 09:10

## 2022-10-29 RX ADMIN — KETOROLAC TROMETHAMINE 60 MG: 30 INJECTION, SOLUTION INTRAMUSCULAR at 09:10

## 2022-10-29 RX ADMIN — GABAPENTIN 600 MG: 300 CAPSULE ORAL at 08:10

## 2022-10-29 RX ADMIN — ORPHENADRINE CITRATE 60 MG: 30 INJECTION INTRAMUSCULAR; INTRAVENOUS at 08:10

## 2022-10-29 RX ADMIN — MORPHINE SULFATE 4 MG: 4 INJECTION INTRAVENOUS at 01:10

## 2022-10-29 NOTE — FIRST PROVIDER EVALUATION
Medical screening examination initiated.  I have conducted a focused provider triage encounter, findings are as follows:    Brief history of present illness:  Patient states that she fell down some steps today. States lower back pain.     There were no vitals filed for this visit.    Pertinent physical exam:  Awake, alert, ambulatory      Brief workup plan:  x-ray    Preliminary workup initiated; this workup will be continued and followed by the physician or advanced practice provider that is assigned to the patient when roomed.

## 2022-10-29 NOTE — DISCHARGE INSTRUCTIONS
Thanks for letting us take care of you today!  It is our goal to give you courteous care and to keep you comfortable and informed, if you have any questions before you leave I will be happy to try and answer them.    Here is some advice after your visit:      Your visit in the emergency department is NOT definitive care - please follow-up with your primary care doctor and/or specialist within 1 week.  Please return if you have any worsening in your condition or if you have any other concerns.    Please follow up with pain management.     Continue taking previously prescribed Lidocaine patches, Prednisone, and Robaxin.

## 2022-10-29 NOTE — ED PROVIDER NOTES
Encounter Date: 10/28/2022       History     Chief Complaint   Patient presents with    Back Pain     Pt c/o low back pain with numbness and tingling down right leg. She was seen here yesterday for same c/o. Dr. Urena did back fusion in march, 2022 he stated she needed pain management and she is currently waiting on appt. She denies any injury. Ambulates with slow steady gait.      37-year-old female presents to ED for chronic back pain. states she has a spinal surgeon as well as trying to get placed into pain management. denies any recent changes, no falls, no worsening of radiation down her legs, no urinary or stool incontinence, no weakness of the lower extremities. describes the pain to her back as generalized with intermittent radiation down both legs posteriorly to the knees. denies any abdominal pain. presenting today for pain management.  No other complaints or concerns at this time.    Review of patient's allergies indicates:   Allergen Reactions    Nsaids (non-steroidal anti-inflammatory drug) Other (See Comments)     Hx of bariatric sx was told not to take oral nsaids     Past Medical History:   Diagnosis Date    Chronic back pain     H/O gastric sleeve      Past Surgical History:   Procedure Laterality Date    abominal plasty      BACK SURGERY      BARIATRIC SURGERY      gastric sleeve      INJECTION OF FACET JOINT Bilateral 08/02/2022    Procedure: bilateral lumbar facet injection L4-5 and L5-S1 (Iv Sedation);  Surgeon: Robert Reynolds Jr., MD;  Location: [facility];  Service: Pain Management;  Laterality: Bilateral;    SPINAL FUSION       History reviewed. No pertinent family history.  Social History     Tobacco Use    Smoking status: Never    Smokeless tobacco: Never   Substance Use Topics    Alcohol use: Never    Drug use: Never     Review of Systems   Constitutional:  Negative for chills, diaphoresis and fever.   HENT:  Negative for congestion, rhinorrhea, sinus pain and sore  throat.    Eyes:  Negative for pain, discharge and itching.   Respiratory:  Negative for cough, chest tightness and shortness of breath.    Cardiovascular:  Negative for chest pain and palpitations.   Gastrointestinal:  Negative for abdominal pain, nausea and vomiting.   Genitourinary:  Negative for dysuria, flank pain and hematuria.   Musculoskeletal:  Positive for back pain. Negative for myalgias, neck pain and neck stiffness.   Skin:  Negative for color change and rash.   Neurological:  Negative for dizziness, weakness and headaches.   Psychiatric/Behavioral:  Negative for confusion. The patient is not hyperactive.      Physical Exam     Initial Vitals [10/28/22 2344]   BP Pulse Resp Temp SpO2   (!) 145/77 94 17 98.2 °F (36.8 °C) 100 %      MAP       --         Physical Exam    Vitals reviewed.  Constitutional: She is not diaphoretic. No distress.   Ambulates without assistance.  Significant other present. NAD.   HENT:   Head: Normocephalic and atraumatic.   Eyes: Conjunctivae and EOM are normal. Pupils are equal, round, and reactive to light.   Neck: Neck supple. No tracheal deviation present.   Normal range of motion.  Cardiovascular:  Normal rate, regular rhythm, normal heart sounds and intact distal pulses.           Pulmonary/Chest: Breath sounds normal. No respiratory distress.   Abdominal: Abdomen is soft. There is no abdominal tenderness. There is no rebound and no guarding.   Musculoskeletal:         General: Normal range of motion.      Cervical back: Normal, normal range of motion and neck supple.      Thoracic back: Normal.      Lumbar back: Spasms and tenderness present. No bony tenderness. Normal range of motion.     Neurological: She is alert and oriented to person, place, and time. She has normal strength. GCS score is 15. GCS eye subscore is 4. GCS verbal subscore is 5. GCS motor subscore is 6.   Skin: Skin is warm and dry. Capillary refill takes less than 2 seconds. No rash noted.   Psychiatric:  She has a normal mood and affect. Her behavior is normal. Judgment and thought content normal.       ED Course   Procedures  Labs Reviewed - No data to display       Imaging Results    None          Medications   oxyCODONE-acetaminophen 5-325 mg per tablet 1 tablet (1 tablet Oral Given 10/29/22 0048)     Medical Decision Making:   ED Management:  37-year-old female presents for persistent chronic lower back pain.  Has spinal specialist but has not seen pain management yet.  States pain is grossly unchanged.  Generalized in the lumbar region both paraspinal and midline.  Intermittent radiation down the legs.  No new trauma. was seen here recently and prescribed Robaxin and steroids.  States neither significantly helped. denying any signs of cauda equina.  Patient ambulatory.  Has generalized discomfort with palpation of the lower back.  No specific bony tenderness. has excellent distal strength and sensation of both legs/feet.  Vascularly intact as well +1 DP. Had extensive bedside conversation with patient concerning recent workups, trialed medications, management of chronic pain. stated very clearly that I was unable to prescribe narcotic medication. voiced understanding.  Did provide medication in department and patient was stable for release.  Very strict return precautions provided which she voiced understanding to. significant other present. (Zmora)                         Clinical Impression:   Final diagnoses:  [M54.42, M54.41, G89.29] Chronic bilateral low back pain with bilateral sciatica (Primary)        ED Disposition Condition    Discharge Stable          ED Prescriptions    None       Follow-up Information       Follow up With Specialties Details Why Contact Info    Riana Urena MD Neurosurgery Schedule an appointment as soon as possible for a visit in 2 days  [address]  [facility] [address] [phone]      [facility] Emergency Medicine  As needed, If symptoms  worsen [address] [facility] [id number] [phone]    Back surgeon  Schedule an appointment as soon as possible for a visit in 3 days               Helio Rocha MD  10/29/22 6753

## 2022-10-29 NOTE — ED PROVIDER NOTES
"Encounter Date: 10/29/2022       History     Chief Complaint   Patient presents with    Back Pain     C/o Lower back pain radiating into legs. States chronic pain pain as well, but "this is worse"     37 y.o. White female with a history of chronic back pain presents to Emergency Department with a chief complaint of back pain. Patient has multiple ER visits within the last few days. Reports she has not heard from pain management but referral has been sent. Her back pain is currently rated as a 10/10 in severity and described as sharp with radiation down bilateral thighs. Associated symptoms include none. Symptoms are aggravated with movement and there are no alleviating factors. The patient denies CP, SOB, urinary/fecal incontinence, recent injury/trauma, fever, or abdominal pain. She reports taking muscle relaxer and steroid prior to arrival with no relief of symptoms. No other reported symptoms at this time.       The history is provided by the patient. No  was used.   Back Pain   This is a chronic problem. The current episode started several weeks ago. The problem occurs throughout the day. The problem has been unchanged. The pain is associated with no known injury. The pain is present in the lumbar spine. The pain radiates to the left thigh and right thigh. The pain is at a severity of 10/10. The symptoms are aggravated by certain positions, bending and twisting. The pain is The same all the time. Stiffness is present All day. Pertinent negatives include no chest pain, no fever, no numbness, no headaches, no abdominal pain, no abdominal swelling, no bowel incontinence, no perianal numbness, no bladder incontinence and no weakness. She has tried analgesics and muscle relaxants for the symptoms. The treatment provided no relief.   Review of patient's allergies indicates:   Allergen Reactions    Nsaids (non-steroidal anti-inflammatory drug) Other (See Comments)     Hx of bariatric sx was told not " to take oral nsaids     Past Medical History:   Diagnosis Date    Chronic back pain     H/O gastric sleeve      Past Surgical History:   Procedure Laterality Date    abominal plasty      BACK SURGERY      BARIATRIC SURGERY      gastric sleeve      INJECTION OF FACET JOINT Bilateral 08/02/2022    Procedure: bilateral lumbar facet injection L4-5 and L5-S1 (Iv Sedation);  Surgeon: Robert Reynolds Jr., MD;  Location: [facility];  Service: Pain Management;  Laterality: Bilateral;    SPINAL FUSION       No family history on file.  Social History     Tobacco Use    Smoking status: Never    Smokeless tobacco: Never   Substance Use Topics    Alcohol use: Never    Drug use: Never     Review of Systems   Constitutional:  Negative for diaphoresis, fatigue and fever.   Eyes:  Negative for photophobia, redness and visual disturbance.   Cardiovascular:  Negative for chest pain.   Gastrointestinal:  Negative for abdominal pain, bowel incontinence, nausea and vomiting.   Endocrine: Negative for polyphagia and polyuria.   Genitourinary:  Negative for bladder incontinence.   Musculoskeletal:  Positive for back pain. Negative for gait problem, joint swelling and neck stiffness.   Neurological:  Negative for dizziness, weakness, numbness and headaches.   All other systems reviewed and are negative.    Physical Exam     Initial Vitals [10/29/22 1301]   BP Pulse Resp Temp SpO2   124/84 84 18 98.6 °F (37 °C) 100 %      MAP       --         Physical Exam    Nursing note and vitals reviewed.  Constitutional: She appears well-developed and well-nourished. She is not diaphoretic. No distress.   HENT:   Head: Normocephalic and atraumatic.   Nose: Nose normal.   Eyes: Conjunctivae and EOM are normal. Pupils are equal, round, and reactive to light. Right eye exhibits no discharge. Left eye exhibits no discharge.   Neck: Neck supple. No JVD present.   Normal range of motion.  Cardiovascular:  Normal rate, regular rhythm, normal heart sounds  and intact distal pulses.           Pulmonary/Chest: Breath sounds normal. No stridor. No respiratory distress. She has no wheezes. She exhibits no tenderness.   Abdominal: Abdomen is soft. Bowel sounds are normal. She exhibits no distension. There is no abdominal tenderness. There is no guarding.   Musculoskeletal:         General: Tenderness (to lumbar region of back) present. No edema. Normal range of motion.      Cervical back: Normal, normal range of motion and neck supple. No swelling, edema, deformity or erythema.      Thoracic back: Normal. No swelling, edema, deformity or signs of trauma.      Lumbar back: Tenderness present. No swelling, edema, deformity or signs of trauma.        Back:      Neurological: She is alert and oriented to person, place, and time. She has normal strength. No sensory deficit. GCS score is 15. GCS eye subscore is 4. GCS verbal subscore is 5. GCS motor subscore is 6.   Skin: Skin is warm and dry. Capillary refill takes less than 2 seconds. No rash noted. No erythema.   Psychiatric: She has a normal mood and affect. Thought content normal.       ED Course   Procedures  Labs Reviewed - No data to display       Imaging Results    None          Medications   morphine injection 4 mg (4 mg Intramuscular Given 10/29/22 1339)   ondansetron disintegrating tablet 4 mg (4 mg Oral Given 10/29/22 1339)   oxyCODONE-acetaminophen 5-325 mg per tablet 2 tablet (2 tablets Oral Given 10/29/22 1501)     Medical Decision Making:   Differential Diagnosis:   Chronic Back Pain, Muscle Strain, Sciatica   ED Management:  Due to patient's presentation, history, and complaints, no tests ordered at this time. Informed patient that she needs to follow up with pain management regarding symptoms and that chronic pain is not managed in the ER. Patient verbalized understanding. Medicated patient in ER. No prescriptions sent due to patient having muscle relaxer, steroids, and recent opioid prescriptions. Patient  denies new or additional complaints; no further tests indicated at this time. Verbalized understanding of instructions. No emergent or apparent distress noted prior to discharge. To follow up with PCP and pain management in 1 week as needed.                           Clinical Impression:   Final diagnoses:  [M54.50, G89.29] Chronic bilateral low back pain without sciatica (Primary)        ED Disposition Condition    Discharge Stable          ED Prescriptions    None       Follow-up Information       Follow up With Specialties Details Why Contact Info    Riana Urena MD Neurosurgery Call in 1 week If symptoms worsen, As needed [address]  [facility] [id number] [phone]      [facility] - Emergency Dept Emergency Medicine Go to  If symptoms worsen, As needed [id number]  [facility] [id number] [phone]             Ladonna Fair NP  10/29/22 1516

## 2022-10-30 NOTE — ED PROVIDER NOTES
Encounter Date: 10/29/2022       History     Chief Complaint   Patient presents with    Back Pain     Patient had fall today c/o lower back pain, seen at [facility] for back pain yesterday, seen at Ortho today and [place]     38 y/o female who presents to ER today for the 3rd time and this is her 11th visit this month for her low back pain. She had surgery with Dr. Urena in March 2022 for the low back pain which she states she has not gotten relief from. She states that she spoke with him yesterday and he recommends she go to pain management. She last saw him on the 13th or 14th of this month she states. Today, around 1400 she states she was walking down her brother's stairs outside his trailer when she slipped and landed on her buttock. This has significantly increased her low back and sacral pain. She c/o weakness to both legs since March as well. She is on lyrica, muscle relaxers and finished opiod RX recently but states nothing helps and she lives in pain. Hx gastric sleeve, fusion in lumbar and abdominoplasty.     She received morphine injection along with percocet at the last ER visit at [facility]. She states it just doesn't help a lot.     The history is provided by the patient. No  was used.   Back Pain   This is a chronic problem. The current episode started several weeks ago (since her surgery pain has not improved and she had pain before her surgery.). The problem occurs daily. The problem has been gradually worsening. Associated with: slipped and fell today. The pain is present in the lumbar spine. The quality of the pain is described as shooting. The pain radiates to the right leg and left leg. The pain is at a severity of 8/10. Associated symptoms include leg pain. She has tried analgesics and muscle relaxants for the symptoms. The treatment provided mild relief. Risk factors include obesity and lack of exercise.   Review of patient's allergies indicates:   Allergen Reactions    Nsaids  (non-steroidal anti-inflammatory drug) Other (See Comments)     Hx of bariatric sx was told not to take oral nsaids     Past Medical History:   Diagnosis Date    Chronic back pain     H/O gastric sleeve      Past Surgical History:   Procedure Laterality Date    abominal plasty      BACK SURGERY      BARIATRIC SURGERY      gastric sleeve      INJECTION OF FACET JOINT Bilateral 08/02/2022    Procedure: bilateral lumbar facet injection L4-5 and L5-S1 (Iv Sedation);  Surgeon: Robert Reynolds Jr., MD;  Location: [facility];  Service: Pain Management;  Laterality: Bilateral;    SPINAL FUSION       No family history on file.  Social History     Tobacco Use    Smoking status: Never    Smokeless tobacco: Never   Substance Use Topics    Alcohol use: Never    Drug use: Never     Review of Systems   Musculoskeletal:  Positive for back pain.   All other systems reviewed and are negative.    Physical Exam     Initial Vitals [10/29/22 1547]   BP Pulse Resp Temp SpO2   (!) 148/91 70 18 98.4 °F (36.9 °C) 99 %      MAP       --         Physical Exam    Nursing note and vitals reviewed.  Constitutional: She appears well-developed and well-nourished.   tearful   Eyes: Conjunctivae are normal.   Cardiovascular:  Regular rhythm, normal heart sounds and intact distal pulses.           Pulmonary/Chest: Breath sounds normal. No respiratory distress.   Musculoskeletal:      Cervical back: No tenderness.      Thoracic back: No tenderness.      Lumbar back: Tenderness (diffuse tenderness across her back low where surgical scars are) and bony tenderness present. No swelling, edema, deformity or lacerations. Decreased range of motion. Positive right straight leg raise test and positive left straight leg raise test.        Back:       Comments: Bilateral UE normal, full ROM.     Bilateral lower extremity strength equal, 4/5. She is able to extend both legs. She then can dorsiflex and plantar flex each foot with good strength.       Neurological: She is alert and oriented to person, place, and time.   Skin: Skin is warm and dry.   Psychiatric: She has a normal mood and affect.       ED Course   Procedures  Labs Reviewed   POCT URINE PREGNANCY          Imaging Results              CT Lumbar Spine Without Contrast (Preliminary result)  Result time 10/29/22 20:39:21      Preliminary result by Tony Hook MD (10/29/22 20:39:21)                   Narrative:    START OF REPORT:  Technique: CT of the lumbar spine was performed without intravenous contrast with direct axial as well as sagittal and coronal reconstruction images.    Comparison: Comparison is with prior CT study [id number] 01:51:43.    Clinical history: Back pain after fall, recent L5-S1 fusion.    Findings:  Anatomy: Unremarkable.  Mineralization: The bony mineralization is within normal limits.  Bone alignment: Unremarkable with no significant listhesis.  Curvature: The lumbar lordosis is maintained.  Bone and bone marrow: The vertebral body heights are maintained.  Intervertebral disc spaces: The intervertebral discs are preserved throughout.  Osteophytes: Multilevel small anterior marginal osteophytes are noted.  Endplate Sclerosis: No endplate sclerosis is seen.  Facet degenerative changes: Mild multilevel facet degenerative changes are seen.  Spinal canal: Unremarkable with no bony spinal canal stenosis identified.  Fractures: No acute fracture dislocation or subluxation is seen.  Vertebral Fusion: Again noted is anterior fusion of L5-S1 with interbody fusion device in place. No hardware related complication is seen.  Visualized sacrum: Normal.      Impression:  1. No acute fracture dislocation or subluxation is seen.  2. Details and other findings as above.                          Preliminary result by Interface, Rad Results In (10/29/22 20:39:21)                   Narrative:    START OF REPORT:  Technique: CT of the lumbar spine was performed without intravenous  contrast with direct axial as well as sagittal and coronal reconstruction images.    Comparison: Comparison is with prior CT study [id number] 01:51:43.    Clinical history: Back pain after fall, recent L5-S1 fusion.    Findings:  Anatomy: Unremarkable.  Mineralization: The bony mineralization is within normal limits.  Bone alignment: Unremarkable with no significant listhesis.  Curvature: The lumbar lordosis is maintained.  Bone and bone marrow: The vertebral body heights are maintained.  Intervertebral disc spaces: The intervertebral discs are preserved throughout.  Osteophytes: Multilevel small anterior marginal osteophytes are noted.  Endplate Sclerosis: No endplate sclerosis is seen.  Facet degenerative changes: Mild multilevel facet degenerative changes are seen.  Spinal canal: Unremarkable with no bony spinal canal stenosis identified.  Fractures: No acute fracture dislocation or subluxation is seen.  Vertebral Fusion: Again noted is anterior fusion of L5-S1 with interbody fusion device in place. No hardware related complication is seen.  Visualized sacrum: Normal.      Impression:  1. No acute fracture dislocation or subluxation is seen.  2. Details and other findings as above.                                         Medications   gabapentin capsule 600 mg (600 mg Oral Given 10/29/22 2000)   orphenadrine injection 60 mg (60 mg Intramuscular Given 10/29/22 2000)   ketorolac injection 60 mg (60 mg Intramuscular Given 10/29/22 2100)   HYDROmorphone (PF) injection 1 mg (1 mg Intramuscular Given 10/29/22 2123)     Medical Decision Making:   Clinical Tests:   Lab Tests: Ordered and Reviewed  Radiological Study: Ordered  Patient care transferred to MARSHA Kaur NP at 2140. Waiting on CT lumbar for disposition.   Additional MDM:   Differential Diagnosis:   Other: The following diagnoses were also considered and will be evaluated: lumbar radiculopathy, sciatica.          ED Course as of 10/29/22 2250   Sat Oct  29, 2022 2248 Did independent assessment of patient.  Agree with above patient has good strength in bilateral lower extremities.  She does have muscle spasms to the right lower back. MARSHA PAEZ-C [CL]      ED Course User Index  [CL] PHILIPPE Roa                 Clinical Impression:   Final diagnoses:  [M54.50, G89.29] Chronic low back pain without sciatica, unspecified back pain laterality (Primary)      ED Disposition Condition    Discharge Stable          ED Prescriptions       Medication Sig Dispense Start Date End Date Auth. Provider    oxyCODONE-acetaminophen (PERCOCET)  mg per tablet Take 1 tablet by mouth every 6 (six) hours as needed for Pain. 16 tablet 10/29/2022 -- PHILIPPE Roa          Follow-up Information       Follow up With Specialties Details Why Contact Info    Your Primary Care Provider  Call in 3 days ed follow up              PHILIPPE Roa  10/29/22 5989

## 2022-11-04 ENCOUNTER — HOSPITAL ENCOUNTER (EMERGENCY)
Facility: HOSPITAL | Age: 37
Discharge: HOME | End: 2022-11-05
Attending: EMERGENCY MEDICINE
Payer: COMMERCIAL
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
## 2022-11-04 DIAGNOSIS — M25.519 SHOULDER PAIN: ICD-10-CM

## 2022-11-04 DIAGNOSIS — M54.50 ACUTE LOW BACK PAIN WITHOUT SCIATICA, UNSPECIFIED BACK PAIN LATERALITY: ICD-10-CM

## 2022-11-04 PROCEDURE — 99284 EMERGENCY DEPT VISIT MOD MDM: CPT | Mod: 25

## 2022-11-05 VITALS
OXYGEN SATURATION: 100 % | HEIGHT: 64 IN | WEIGHT: 276.88 LBS | HEART RATE: 73 BPM | DIASTOLIC BLOOD PRESSURE: 78 MMHG | RESPIRATION RATE: 16 BRPM | SYSTOLIC BLOOD PRESSURE: 115 MMHG | TEMPERATURE: 98 F | BODY MASS INDEX: 47.27 KG/M2

## 2022-11-05 PROCEDURE — 25000003 PHARM REV CODE 250: Performed by: EMERGENCY MEDICINE

## 2022-11-05 RX ORDER — HYDROCODONE BITARTRATE AND ACETAMINOPHEN 5; 325 MG/1; MG/1
1 TABLET ORAL EVERY 6 HOURS PRN
Qty: 12 TABLET | Refills: 0 | Status: SHIPPED | OUTPATIENT
Start: 2022-11-05 | End: 2022-11-27 | Stop reason: ALTCHOICE

## 2022-11-05 RX ORDER — HYDROCODONE BITARTRATE AND ACETAMINOPHEN 5; 325 MG/1; MG/1
1 TABLET ORAL
Status: COMPLETED | OUTPATIENT
Start: 2022-11-05 | End: 2022-11-05

## 2022-11-05 RX ADMIN — HYDROCODONE BITARTRATE AND ACETAMINOPHEN 1 TABLET: 5; 325 TABLET ORAL at 02:11

## 2022-11-05 NOTE — ED PROVIDER NOTES
Encounter Date: 11/4/2022    SCRIBE #1 NOTE: I, Everette Benz, am scribing for, and in the presence of,  Dr. Lopez. I have scribed the following portions of the note - Other sections scribed: HPI, ROS, Physical Exam, MDM, Attending.     History     Chief Complaint   Patient presents with    Fall     Fell getting out of bathtub.  Complaint of right arm pain, head pain, lower back pain.  No LOC at time of incident.      38 y/o CF with history of chronic back pain presents to ED for R shoulder pain and lower back pain following a fall out of her bathtub around 1800 yesterday.  Pt denies LOC, but she does also complain of a HA.  She has history of back surgery, but she does not take any pain meds at home.  She is waiting to see a pain management doctor.      The history is provided by the patient.   Fall  Illness onset: about 8 hours ago. Distance fallen: ground level. The pain is present in the right shoulder, back and head. Associated symptoms include back pain (lower) and headaches. Pertinent negatives include no fever, no abdominal pain, no nausea, no vomiting and no loss of consciousness.   Review of patient's allergies indicates:   Allergen Reactions    Nsaids (non-steroidal anti-inflammatory drug) Other (See Comments)     Hx of bariatric sx was told not to take oral nsaids     Past Medical History:   Diagnosis Date    Chronic back pain     H/O gastric sleeve      Past Surgical History:   Procedure Laterality Date    abominal plasty      BACK SURGERY      BARIATRIC SURGERY      gastric sleeve      INJECTION OF FACET JOINT Bilateral 08/02/2022    Procedure: bilateral lumbar facet injection L4-5 and L5-S1 (Iv Sedation);  Surgeon: Robert Reynolds Jr., MD;  Location: [facility];  Service: Pain Management;  Laterality: Bilateral;    SPINAL FUSION       No family history on file.  Social History     Tobacco Use    Smoking status: Never    Smokeless tobacco: Never   Substance Use Topics    Alcohol use: Never     Drug use: Never     Review of Systems   Constitutional:  Negative for chills and fever.   HENT:  Negative for congestion and ear pain.    Eyes:  Negative for discharge.   Respiratory:  Negative for cough, shortness of breath and wheezing.    Cardiovascular:  Negative for chest pain and leg swelling.   Gastrointestinal:  Negative for abdominal pain, constipation, diarrhea, nausea and vomiting.   Genitourinary:  Negative for dysuria, flank pain and frequency.   Musculoskeletal:  Positive for arthralgias (R shoulder) and back pain (lower). Negative for joint swelling.   Skin:  Negative for rash.   Neurological:  Positive for headaches. Negative for dizziness, loss of consciousness and weakness.   Psychiatric/Behavioral:  Negative for agitation, confusion and hallucinations.      Physical Exam     Initial Vitals [11/04/22 2024]   BP Pulse Resp Temp SpO2   134/89 101 20 97.8 °F (36.6 °C) 100 %      MAP       --         Physical Exam    Nursing note and vitals reviewed.  Constitutional: She appears well-developed. No distress.   HENT:   Head: Normocephalic and atraumatic.   Mouth/Throat: Oropharynx is clear and moist.   Eyes: Conjunctivae and EOM are normal. Pupils are equal, round, and reactive to light.   Neck: Neck supple.   Cardiovascular:  Normal rate and regular rhythm.           No murmur heard.  Pulmonary/Chest: Breath sounds normal. No respiratory distress. She exhibits no tenderness.   Abdominal: Abdomen is soft. Bowel sounds are normal. She exhibits no distension. There is no abdominal tenderness.   Musculoskeletal:         General: No tenderness. Normal range of motion.      Cervical back: Neck supple.      Lumbar back: Normal. Normal range of motion.      Comments: No midline tenderness to lower back     Neurological: She is alert and oriented to person, place, and time. She has normal strength. No cranial nerve deficit or sensory deficit.   Intact sensation and motor to LE's   Skin:   No bruising or  abrasions to R shoulder or lower back   Psychiatric: Judgment normal.   Tearful; anxious       ED Course   Procedures  Labs Reviewed - No data to display       Imaging Results              X-Ray Lumbar Spine Ap And Lateral (Final result)  Result time 11/04/22 21:09:19      Final result by Murali Dillard MD (11/04/22 21:09:19)                   Impression:      Status post L5-S1 fusion without evidence for fracture deformity.  Scattered spondylotic changes.      Electronically signed by: Murali Dillard MD  Date:    11/04/2022  Time:    21:09               Narrative:    EXAMINATION:  XR LUMBAR SPINE AP AND LATERAL    CLINICAL HISTORY:  back pain;    TECHNIQUE:  AP, lateral and spot images were performed of the lumbar spine.    COMPARISON:  10/23/2022    FINDINGS:  Five lumbar type vertebral bodies with normal alignment curvature.  Status post anterior fusion at L5-S1 without evidence for complication.  Vertebral heights are maintained as are the intervertebral disc spaces.  Progressive spondylotic changes are identified.    The bilateral SI joints and pubic symphysis are normal.                                       X-Ray Shoulder Complete 2 View Right (Final result)  Result time 11/04/22 21:08:35      Final result by Murali Dillard MD (11/04/22 21:08:35)                   Impression:      No fracture or dislocation.      Electronically signed by: Murali Dillard MD  Date:    11/04/2022  Time:    21:08               Narrative:    EXAMINATION:  XR SHOULDER COMPLETE 2 OR MORE VIEWS RIGHT    CLINICAL HISTORY:  Pain in unspecified shoulder    TECHNIQUE:  Two or three views of the right shoulder were performed.    COMPARISON:  08/02/2022    FINDINGS:  No fracture dislocation.  The alignment and joint spaces are normal.  No soft tissue swelling.    The visualized lungs are clear and the visualized ribs are intact.                                       Medications   HYDROcodone-acetaminophen 5-325 mg per tablet 1 tablet (1 tablet  Oral Given 11/5/22 0200)     Medical Decision Making:   Clinical Tests:   Lab Tests: Ordered and Reviewed  Radiological Study: Ordered and Reviewed        Scribe Attestation:   Scribe #1: I performed the above scribed service and the documentation accurately describes the services I performed. I attest to the accuracy of the note.    Attending Attestation:           Physician Attestation for Scribe:  Physician Attestation Statement for Scribe #1: I, reviewed documentation, as scribed by Everette Benz in my presence, and it is both accurate and complete.                        Clinical Impression:   Final diagnoses:  [M25.519] Shoulder pain  [W19.XXXA] Fall, initial encounter (Primary)  [M54.50] Acute low back pain without sciatica, unspecified back pain laterality        ED Disposition Condition    Discharge Stable          ED Prescriptions       Medication Sig Dispense Start Date End Date Auth. Provider    HYDROcodone-acetaminophen (NORCO) 5-325 mg per tablet Take 1 tablet by mouth every 6 (six) hours as needed for Pain. 12 tablet 11/5/2022 -- Elvis Lopez MD          Follow-up Information       Follow up With Specialties Details Why Contact Info    Riana Urena MD Neurosurgery In 2 days  [address]  [facility] [id number] [phone]               Elvis Lopez MD  11/10/22 8512

## 2022-11-05 NOTE — FIRST PROVIDER EVALUATION
Medical screening examination initiated.  I have conducted a focused provider triage encounter, findings are as follows:    Brief history of present illness:  reports fall in tub 2 hours ago. C/o R shoulder pain, head pain, and lower back pain. -LOC/ no blood thinner use.     There were no vitals filed for this visit.    Pertinent physical exam:  ambulatory into triage, alert, non-labored breathing.     Brief workup plan:  imaging.     Preliminary workup initiated; this workup will be continued and followed by the physician or advanced practice provider that is assigned to the patient when roomed.

## 2022-11-07 ENCOUNTER — HOSPITAL ENCOUNTER (EMERGENCY)
Facility: HOSPITAL | Age: 37
Discharge: HOME | End: 2022-11-08
Attending: STUDENT IN AN ORGANIZED HEALTH CARE EDUCATION/TRAINING PROGRAM
Payer: COMMERCIAL

## 2022-11-07 DIAGNOSIS — M54.50 CHRONIC MIDLINE LOW BACK PAIN WITHOUT SCIATICA: Primary | ICD-10-CM

## 2022-11-07 DIAGNOSIS — G89.29 CHRONIC MIDLINE LOW BACK PAIN WITHOUT SCIATICA: Primary | ICD-10-CM

## 2022-11-07 PROCEDURE — 99284 EMERGENCY DEPT VISIT MOD MDM: CPT | Mod: 25

## 2022-11-08 VITALS
HEIGHT: 64 IN | TEMPERATURE: 98 F | BODY MASS INDEX: 47.8 KG/M2 | WEIGHT: 280 LBS | SYSTOLIC BLOOD PRESSURE: 112 MMHG | HEART RATE: 108 BPM | RESPIRATION RATE: 17 BRPM | DIASTOLIC BLOOD PRESSURE: 81 MMHG | OXYGEN SATURATION: 100 %

## 2022-11-08 PROCEDURE — 25000003 PHARM REV CODE 250: Performed by: STUDENT IN AN ORGANIZED HEALTH CARE EDUCATION/TRAINING PROGRAM

## 2022-11-08 PROCEDURE — 96372 THER/PROPH/DIAG INJ SC/IM: CPT | Performed by: STUDENT IN AN ORGANIZED HEALTH CARE EDUCATION/TRAINING PROGRAM

## 2022-11-08 PROCEDURE — 63600175 PHARM REV CODE 636 W HCPCS: Performed by: STUDENT IN AN ORGANIZED HEALTH CARE EDUCATION/TRAINING PROGRAM

## 2022-11-08 RX ORDER — METHOCARBAMOL 750 MG/1
1500 TABLET, FILM COATED ORAL 3 TIMES DAILY
Qty: 30 TABLET | Refills: 0 | Status: SHIPPED | OUTPATIENT
Start: 2022-11-08 | End: 2022-11-13

## 2022-11-08 RX ORDER — METHOCARBAMOL 750 MG/1
1500 TABLET, FILM COATED ORAL
Status: COMPLETED | OUTPATIENT
Start: 2022-11-08 | End: 2022-11-08

## 2022-11-08 RX ORDER — LIDOCAINE 50 MG/G
1 PATCH TOPICAL DAILY
Qty: 5 PATCH | Refills: 0 | Status: SHIPPED | OUTPATIENT
Start: 2022-11-08

## 2022-11-08 RX ORDER — LIDOCAINE 50 MG/G
1 PATCH TOPICAL
Status: DISCONTINUED | OUTPATIENT
Start: 2022-11-08 | End: 2022-11-08

## 2022-11-08 RX ORDER — HYDROCODONE BITARTRATE AND ACETAMINOPHEN 10; 325 MG/1; MG/1
1 TABLET ORAL
Status: COMPLETED | OUTPATIENT
Start: 2022-11-08 | End: 2022-11-08

## 2022-11-08 RX ORDER — LIDOCAINE 50 MG/G
1 PATCH TOPICAL
Status: DISCONTINUED | OUTPATIENT
Start: 2022-11-08 | End: 2022-11-08 | Stop reason: HOSPADM

## 2022-11-08 RX ORDER — KETOROLAC TROMETHAMINE 30 MG/ML
30 INJECTION, SOLUTION INTRAMUSCULAR; INTRAVENOUS
Status: COMPLETED | OUTPATIENT
Start: 2022-11-08 | End: 2022-11-08

## 2022-11-08 RX ADMIN — LIDOCAINE 1 PATCH: 50 PATCH TOPICAL at 02:11

## 2022-11-08 RX ADMIN — METHOCARBAMOL 1500 MG: 750 TABLET ORAL at 03:11

## 2022-11-08 RX ADMIN — HYDROCODONE BITARTRATE AND ACETAMINOPHEN 1 TABLET: 10; 325 TABLET ORAL at 03:11

## 2022-11-08 RX ADMIN — KETOROLAC TROMETHAMINE 30 MG: 30 INJECTION, SOLUTION INTRAMUSCULAR at 02:11

## 2022-11-08 NOTE — DISCHARGE INSTRUCTIONS
Please follow up with your primary care physician in 2-3 days.  It is very important that you follow-up with your neurosurgeon for further diagnostic evaluation management.  You have been referred to pain management, it is very important that you follow-up with the specialist as they will be able to help control your pain and significantly decrease her need for repetitive emergency department visits.    Please continue to use the lidocaine patches as prescribed.  He may keep these in place for up to 12 hours at a time before you need to remove it.    Please continue to use the muscle relaxers he had been prescribed as directed.  Please do not drink alcohol, drive, operate heavy machinery, or perform other activities that require your full focus as this medication mEq drowsy.    Please return to the emergency department if you develop any worsening symptoms, fever, chest pain, difficulty breathing, or any other new symptoms or concerns.      Thank you for allowing us to take care of you today.

## 2022-11-08 NOTE — ED PROVIDER NOTES
Encounter Date: 11/7/2022    SCRIBE #1 NOTE: I, Luly Ponce, am scribing for, and in the presence of,  Seth Guzman MD. I have scribed the following portions of the note - Other sections scribed: HPI, ROS, PE.     History     Chief Complaint   Patient presents with    Back Pain     C/o lower back pain. Fell on Friday, was seen here. States pain significantly worse to lower back. Hx fusion surgery this year. Prescribed medications not working. Norco 5mg q6h, no muscle relaxers     37 year old female with history of a spinal fusion and chronic back pain presents to the ED with complaints of back pain following a fall 4 days ago. Pt reports that she fell while getting out of the tub a few days ago and came to the ED and was given 5 mg of Norco to take every six hours. She complains that the Norco has not helped and that her back pain is worsening. She denies numbness, tingling, urinary incontinence, fever, and chills.     The history is provided by the patient. No  was used.   Back Pain   This is a chronic problem. The current episode started several days ago. The problem occurs constantly. The problem has been gradually worsening. The pain is associated with falling. The pain does not radiate. The pain is The same all the time. Pertinent negatives include no chest pain, no fever, no numbness, no abdominal pain, no bladder incontinence, no dysuria, no tingling and no weakness. She has tried analgesics for the symptoms. The treatment provided no relief.   Review of patient's allergies indicates:   Allergen Reactions    Nsaids (non-steroidal anti-inflammatory drug) Other (See Comments)     Hx of bariatric sx was told not to take oral nsaids     Past Medical History:   Diagnosis Date    Chronic back pain     H/O gastric sleeve      Past Surgical History:   Procedure Laterality Date    abominal plasty      BACK SURGERY      BARIATRIC SURGERY      gastric sleeve      INJECTION OF FACET JOINT  Bilateral 08/02/2022    Procedure: bilateral lumbar facet injection L4-5 and L5-S1 (Iv Sedation);  Surgeon: Robert Reynolds Jr., MD;  Location: [facility];  Service: Pain Management;  Laterality: Bilateral;    SPINAL FUSION       No family history on file.  Social History     Tobacco Use    Smoking status: Never    Smokeless tobacco: Never   Substance Use Topics    Alcohol use: Never    Drug use: Never     Review of Systems   Constitutional:  Negative for chills and fever.   HENT:  Negative for congestion, drooling and sore throat.    Eyes:  Negative for pain and visual disturbance.   Respiratory:  Negative for chest tightness, shortness of breath and wheezing.    Cardiovascular:  Negative for chest pain, palpitations and leg swelling.   Gastrointestinal:  Negative for abdominal pain, nausea and vomiting.   Genitourinary:  Negative for bladder incontinence, dysuria and hematuria.   Musculoskeletal:  Positive for back pain. Negative for neck pain and neck stiffness.   Skin:  Negative for pallor and rash.   Neurological:  Negative for tingling, weakness and numbness.   Hematological:  Does not bruise/bleed easily.     Physical Exam     Initial Vitals [11/07/22 2324]   BP Pulse Resp Temp SpO2   112/81 108 15 97.7 °F (36.5 °C) 100 %      MAP       --         Physical Exam    Nursing note and vitals reviewed.  Constitutional: She appears well-developed and well-nourished. She is not diaphoretic. No distress.   HENT:   Head: Normocephalic and atraumatic.   Nose: Nose normal.   Mouth/Throat: Oropharynx is clear and moist.   Eyes: EOM are normal. Pupils are equal, round, and reactive to light.   Neck: Neck supple.   Normal range of motion.  Cardiovascular:  Normal rate and regular rhythm.           No murmur heard.  Pulmonary/Chest: Breath sounds normal. No respiratory distress. She has no wheezes. She has no rales.   Abdominal: Abdomen is soft. She exhibits no distension. There is no abdominal tenderness.    Musculoskeletal:         General: No edema. Normal range of motion.      Cervical back: Normal range of motion and neck supple.     Neurological: She is alert and oriented to person, place, and time. She has normal strength. No cranial nerve deficit or sensory deficit. GCS score is 15. GCS eye subscore is 4. GCS verbal subscore is 5. GCS motor subscore is 6.   Skin: Skin is warm. Capillary refill takes less than 2 seconds. No rash noted.   Psychiatric: She has a normal mood and affect. Her behavior is normal.       ED Course   Procedures  Labs Reviewed - No data to display       Imaging Results    None          Medications   ketorolac injection 30 mg (30 mg Intramuscular Given 11/8/22 0230)   methocarbamoL tablet 1,500 mg (1,500 mg Oral Given 11/8/22 0300)   HYDROcodone-acetaminophen  mg per tablet 1 tablet (1 tablet Oral Given 11/8/22 1998)     Medical Decision Making:   Differential Diagnosis:   Lumbago, sciatica back pain, muscle strain, muscle sprain, fracture (doubt), cauda equina (doubt), cord compression (doubt)  ED Management:  MDM: Carol Edmond is a 37 y.o. female with above past medical history who presents to the ED for chronic back pain. Vital signs reviewed.  Patient is afebrile and hemodynamically stable.  She has generalized tenderness to her lower back.  She has no red flag symptoms.  She is ambulatory at her baseline with a steady gait.  Patient was recently seen several days ago here and states that she ran out of her pain medications which led her to come in today.  She has had no new recent falls or trauma and no change in her symptoms otherwise.  She had imaging done several days ago that was overall unremarkable, no need for any emergent imaging today.  I will proceed with symptomatic treatment for pain relief.  Patient agrees with the plan of care and all questions answered at bedside.    Update:  Patient reports improvement in her pain with medications given in the emergency  department.  She remains ambulatory at her baseline with a steady gait.  She continues to have no red flag symptoms at this time.  I have stressed the importance of following up with her primary care physician, her spinal surgeon, and her pain management physician to which she has been referred.  Patient has verbalized understanding of the need to follow-up for continued control of her pain in the outpatient setting.  Strict return precautions have been discussed and the patient has verbalized understanding.    Dispo: Discharge    Data Reviewed/Counseling: I have personally reviewed the patient's vital signs, nursing notes, and other relevant tests, information, and imaging. I had a detailed discussion regarding the historical points, exam findings, and any diagnostic results supporting the discharge diagnosis.     Portions of this note were dictated using voice recognition software. Although it was reviewed for accuracy, some inherent voice recognition errors may have occurred and be present in this document.            Attending Attestation:           Physician Attestation for Scribe:  Physician Attestation Statement for Scribe #1: I, Seth Guzman MD, reviewed documentation, as scribed by Luly Ponce in my presence, and it is both accurate and complete.           ED Course as of 11/14/22 1049   Tue Nov 08, 2022   7892 I have reassessed the patient.  Patient is resting comfortably, no acute distress.  Vital signs stable.  Discussed all results including incidental findings.  Discussed need for follow up and discussed return precautions.  Answered all questions at this time.  Hemodynamically stable for continued outpatient management. Patient verbalized understanding and agreed to plan.   []   4647 Patient has had multiple recent prescriptions for narcotic pain medications. I have given a one time ED dose for pain control and will proceed with discharge with conservative therapy including muscle relaxers,  anti-inflammatories, and lidocaine patch. Patient is to follow up with her PCP and her neurosurgeon. Patient has already been referred to pain management, I have stressed the importance of following up with that referral for further care. [MC]      ED Course User Index  [MC] Seth Guzman MD                 Clinical Impression:   Final diagnoses:  [M54.50, G89.29] Chronic midline low back pain without sciatica (Primary)      ED Disposition Condition    Discharge Stable          ED Prescriptions       Medication Sig Dispense Start Date End Date Auth. Provider    methocarbamoL (ROBAXIN) 750 MG Tab () Take 2 tablets (1,500 mg total) by mouth 3 (three) times daily. for 5 days 30 tablet 2022 Seth Guzman MD    LIDOcaine (LIDODERM) 5 % Place 1 patch onto the skin once daily. Remove & Discard patch within 12 hours or as directed by MD 5 patch 2022 -- Seth Guzman MD          Follow-up Information       Follow up With Specialties Details Why Contact Info    Riana Urena MD Neurosurgery In 2 days  [address]  [facility] [id number] [phone]      [facility] - Emergency Dept Emergency Medicine  If symptoms worsen [address] [facility] [address] [phone]             Seth Guzman MD  22 2824

## 2022-11-19 ENCOUNTER — HOSPITAL ENCOUNTER (EMERGENCY)
Facility: HOSPITAL | Age: 37
Discharge: HOME | End: 2022-11-20
Attending: FAMILY MEDICINE
Payer: COMMERCIAL

## 2022-11-19 VITALS
DIASTOLIC BLOOD PRESSURE: 77 MMHG | OXYGEN SATURATION: 100 % | TEMPERATURE: 98 F | SYSTOLIC BLOOD PRESSURE: 121 MMHG | HEART RATE: 94 BPM | RESPIRATION RATE: 16 BRPM

## 2022-11-19 DIAGNOSIS — G89.29 CHRONIC BILATERAL LOW BACK PAIN WITHOUT SCIATICA: ICD-10-CM
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
## 2022-11-19 DIAGNOSIS — M54.50 CHRONIC BILATERAL LOW BACK PAIN WITHOUT SCIATICA: ICD-10-CM

## 2022-11-19 PROCEDURE — 99283 EMERGENCY DEPT VISIT LOW MDM: CPT | Mod: 25

## 2022-11-19 RX ORDER — METHOCARBAMOL 750 MG/1
1500 TABLET, FILM COATED ORAL
Status: COMPLETED | OUTPATIENT
Start: 2022-11-20 | End: 2022-11-20

## 2022-11-20 ENCOUNTER — HOSPITAL ENCOUNTER (EMERGENCY)
Facility: HOSPITAL | Age: 37
Discharge: HOME | End: 2022-11-20
Attending: EMERGENCY MEDICINE
Payer: COMMERCIAL

## 2022-11-20 VITALS
WEIGHT: 286.38 LBS | OXYGEN SATURATION: 99 % | DIASTOLIC BLOOD PRESSURE: 83 MMHG | TEMPERATURE: 98 F | RESPIRATION RATE: 18 BRPM | HEIGHT: 64 IN | BODY MASS INDEX: 48.89 KG/M2 | SYSTOLIC BLOOD PRESSURE: 136 MMHG | HEART RATE: 58 BPM

## 2022-11-20 DIAGNOSIS — M54.9 BACK PAIN, UNSPECIFIED BACK LOCATION, UNSPECIFIED BACK PAIN LATERALITY, UNSPECIFIED CHRONICITY: Primary | ICD-10-CM

## 2022-11-20 PROCEDURE — 99284 EMERGENCY DEPT VISIT MOD MDM: CPT | Mod: 25

## 2022-11-20 PROCEDURE — 63600175 PHARM REV CODE 636 W HCPCS: Performed by: EMERGENCY MEDICINE

## 2022-11-20 PROCEDURE — 96372 THER/PROPH/DIAG INJ SC/IM: CPT | Performed by: EMERGENCY MEDICINE

## 2022-11-20 PROCEDURE — 25000003 PHARM REV CODE 250: Performed by: EMERGENCY MEDICINE

## 2022-11-20 PROCEDURE — 25000003 PHARM REV CODE 250: Performed by: FAMILY MEDICINE

## 2022-11-20 RX ORDER — HALOPERIDOL 5 MG/ML
5 INJECTION INTRAMUSCULAR
Status: COMPLETED | OUTPATIENT
Start: 2022-11-20 | End: 2022-11-20

## 2022-11-20 RX ORDER — HYDROCODONE BITARTRATE AND ACETAMINOPHEN 10; 325 MG/1; MG/1
1 TABLET ORAL
Status: COMPLETED | OUTPATIENT
Start: 2022-11-20 | End: 2022-11-20

## 2022-11-20 RX ORDER — ZIPRASIDONE MESYLATE 20 MG/ML
10 INJECTION, POWDER, LYOPHILIZED, FOR SOLUTION INTRAMUSCULAR
Status: DISCONTINUED | OUTPATIENT
Start: 2022-11-20 | End: 2022-11-20 | Stop reason: HOSPADM

## 2022-11-20 RX ORDER — ONDANSETRON 4 MG/1
4 TABLET, ORALLY DISINTEGRATING ORAL
Status: COMPLETED | OUTPATIENT
Start: 2022-11-20 | End: 2022-11-20

## 2022-11-20 RX ADMIN — HALOPERIDOL LACTATE 5 MG: 5 INJECTION, SOLUTION INTRAMUSCULAR at 05:11

## 2022-11-20 RX ADMIN — ONDANSETRON 4 MG: 4 TABLET, ORALLY DISINTEGRATING ORAL at 05:11

## 2022-11-20 RX ADMIN — HYDROCODONE BITARTRATE AND ACETAMINOPHEN 1 TABLET: 10; 325 TABLET ORAL at 05:11

## 2022-11-20 RX ADMIN — METHOCARBAMOL 1500 MG: 750 TABLET ORAL at 12:11

## 2022-11-20 NOTE — ED PROVIDER NOTES
Encounter Date: 11/19/2022       History     Chief Complaint   Patient presents with    Fall     37-year-old female with a history of a L5-S1 fusion by Dr. Urena presents with chronic ongoing back pain.  Patient claims that she slipped and fell in her driveway earlier tonight.  States she landed flat on her but which caused worsening of her pain.  No abrasions or signs of trauma.  No LOC. No blood thinners.  No bowel or bladder incontinence.  No dysuria or hematuria.  Patient ambulates with a steady gait.  No other complaints.  Multiple ED visits for similar complaints.    Review of patient's allergies indicates:   Allergen Reactions    Nsaids (non-steroidal anti-inflammatory drug) Other (See Comments)     Hx of bariatric sx was told not to take oral nsaids  Not a true allergy, pt reports can not take orally due to surgery     Past Medical History:   Diagnosis Date    Chronic back pain     H/O gastric sleeve      Past Surgical History:   Procedure Laterality Date    abominal plasty      BACK SURGERY      BARIATRIC SURGERY      gastric sleeve      INJECTION OF FACET JOINT Bilateral 08/02/2022    Procedure: bilateral lumbar facet injection L4-5 and L5-S1 (Iv Sedation);  Surgeon: Robert Reynolds Jr., MD;  Location: [facility];  Service: Pain Management;  Laterality: Bilateral;    SPINAL FUSION       History reviewed. No pertinent family history.  Social History     Tobacco Use    Smoking status: Never    Smokeless tobacco: Never   Substance Use Topics    Alcohol use: Never    Drug use: Never     Review of Systems   Constitutional: Negative.    HENT: Negative.     Eyes: Negative.    Respiratory: Negative.     Cardiovascular: Negative.    Gastrointestinal: Negative.    Endocrine: Negative.    Genitourinary: Negative.    Musculoskeletal:  Positive for back pain.   Skin: Negative.    Allergic/Immunologic: Negative.    Neurological: Negative.    Hematological: Negative.    Psychiatric/Behavioral: Negative.        Physical Exam     Initial Vitals [11/19/22 2146]   BP Pulse Resp Temp SpO2   121/77 94 16 97.7 °F (36.5 °C) 100 %      MAP       --         Physical Exam    Nursing note and vitals reviewed.  Constitutional: She appears well-developed and well-nourished.   HENT:   Head: Normocephalic and atraumatic.   Eyes: Conjunctivae and EOM are normal. Pupils are equal, round, and reactive to light.   Neck: Neck supple.   Normal range of motion.  Cardiovascular:  Normal rate and regular rhythm.           Pulmonary/Chest: Breath sounds normal.   Abdominal: Abdomen is soft. Bowel sounds are normal.   Musculoskeletal:         General: Normal range of motion.      Cervical back: Normal range of motion and neck supple.      Comments: Lumbar spine-no abrasions or signs of trauma.  Mildly tender in the paraspinal region.  No point bony tenderness.     Neurological: She is alert and oriented to person, place, and time. She has normal strength. GCS score is 15. GCS eye subscore is 4. GCS verbal subscore is 5. GCS motor subscore is 6.   Ambulates with a steady gait   Skin: Skin is warm and dry. Capillary refill takes less than 2 seconds.   Psychiatric: She has a normal mood and affect.       ED Course   Procedures  Labs Reviewed - No data to display       Imaging Results              X-Ray Lumbar Spine Ap And Lateral (Preliminary result)  Result time 11/20/22 00:18:02      ED Interpretation by Garry Valentin MD (11/20/22 00:18:02, [facility] - Emergency Dept, Emergency Medicine)    Status post L5-S1 fusion.  No acute bony abnormality.                                     Medications   ziprasidone injection 10 mg (has no administration in time range)   methocarbamoL tablet 1,500 mg (1,500 mg Oral Given 11/20/22 0002)     Medical Decision Making:   Independently Interpreted Test(s):   I have ordered and independently interpreted X-rays - see summary below.       <> Summary of X-Ray Reading(s): Status post L5-S1  fusion.  No acute bony abnormality.  Clinical Tests:   Radiological Study: Ordered and Reviewed  ED Management:  Patient is nontoxic-appearing acute distress.  Vital signs stable.  Benign physical exam.  No obvious signs of trauma.  X-ray shows no acute bony abnormality.  Patient has chronic back pain and multiple ED visits for similar complaints.  States she is still waiting to get into pain management.  Explained that I will not treat her chronic pain with narcotics in the ED.  Patient was given Robaxin without much relief.  Offered her a nonnarcotic injection which patient initially agreed upon.  However, patient left the ED not long after.  She was seen ambulating without difficulty out of the ED.                        Clinical Impression:   Final diagnoses:  [W19.XXXA] Fall, initial encounter (Primary)  [M54.50, G89.29] Chronic bilateral low back pain without sciatica        ED Disposition Condition    Discharge Stable          ED Prescriptions    None       Follow-up Information       Follow up With Specialties Details Why Contact Info    Riana Urena MD Neurosurgery   [address]  [facility] [id number] [phone]               Garry Valentin MD  11/20/22 0115

## 2022-11-20 NOTE — ED PROVIDER NOTES
Encounter Date: 11/20/2022       History     Chief Complaint   Patient presents with    Fall     Fell last night at 1900.  Complaint of lower back pain, sacral pain. Seen at [facility] last night for same problem.      38 yo f with multiple visits recently for falls/lbp presents after she fell on to her buttock while going to mailbox, did not hit head, c/o of lbp, no other injury or pain.      Review of patient's allergies indicates:   Allergen Reactions    Nsaids (non-steroidal anti-inflammatory drug) Other (See Comments)     Hx of bariatric sx was told not to take oral nsaids  Not a true allergy, pt reports can not take orally due to surgery     Past Medical History:   Diagnosis Date    Chronic back pain     H/O gastric sleeve      Past Surgical History:   Procedure Laterality Date    abominal plasty      BACK SURGERY      BARIATRIC SURGERY      gastric sleeve      INJECTION OF FACET JOINT Bilateral 08/02/2022    Procedure: bilateral lumbar facet injection L4-5 and L5-S1 (Iv Sedation);  Surgeon: Robert Reynolds Jr., MD;  Location: [facility];  Service: Pain Management;  Laterality: Bilateral;    SPINAL FUSION       No family history on file.  Social History     Tobacco Use    Smoking status: Never    Smokeless tobacco: Never   Substance Use Topics    Alcohol use: Never    Drug use: Never     Review of Systems   Constitutional:  Negative for chills, diaphoresis, fatigue and fever.   HENT:  Negative for congestion and trouble swallowing.    Eyes:  Negative for pain and discharge.   Respiratory:  Negative for cough and wheezing.    Cardiovascular:  Negative for chest pain and leg swelling.   Gastrointestinal:  Negative for abdominal pain, diarrhea, nausea and vomiting.   Genitourinary:  Negative for dysuria, flank pain, vaginal bleeding and vaginal discharge.   Musculoskeletal:  Negative for arthralgias.   Skin:  Negative for color change.   Neurological:  Negative for syncope, speech difficulty and weakness.    Psychiatric/Behavioral:  Negative for agitation and confusion.    All other systems reviewed and are negative.    Physical Exam     Initial Vitals [11/20/22 0343]   BP Pulse Resp Temp SpO2   135/83 74 20 98.1 °F (36.7 °C) 100 %      MAP       --         Physical Exam    Constitutional: She appears distressed.   HENT:   Head: Normocephalic.   Eyes: EOM are normal. Left eye exhibits no discharge. No scleral icterus.   Cardiovascular:  Regular rhythm.           Pulmonary/Chest: No stridor. No respiratory distress.   Abdominal: She exhibits no distension.   Musculoskeletal:         General: Normal range of motion.      Comments: No ml back tenderness, no bruising or abrasion.     Neurological: She is alert and oriented to person, place, and time. She has normal strength.   Skin: Skin is dry. No rash noted. No erythema. No pallor.   Psychiatric: Her behavior is normal. Judgment and thought content normal.   Anxious, tearful       ED Course   Procedures  Labs Reviewed   PREGNANCY TEST, URINE RAPID          Imaging Results              X-Ray Lumbar Spine Ap And Lateral (In process)                   X-Rays:   Independently Interpreted Readings:   Other Readings:  Xr neg    Medications   HYDROcodone-acetaminophen  mg per tablet 1 tablet (has no administration in time range)   ondansetron disintegrating tablet 4 mg (has no administration in time range)   haloperidol lactate injection 5 mg (has no administration in time range)     Medical Decision Making:   Spoke with pt about opiate diversion, will not rx opiates.                        Clinical Impression:   Final diagnoses:  [M54.9] Back pain, unspecified back location, unspecified back pain laterality, unspecified chronicity (Primary)      ED Disposition Condition    Discharge Stable          ED Prescriptions    None       Follow-up Information       Follow up With Specialties Details Why Contact Info    Riana Urena MD Neurosurgery In 1 week  [address]  [place]  [place] [address]  [phone]      PCP  Call in 1 day  follow up with PCP ni 1-2 days             Elvis Lopez MD  11/20/22 0515       Elvis Lopez MD  11/20/22 0539

## 2022-11-27 ENCOUNTER — HOSPITAL ENCOUNTER (EMERGENCY)
Facility: HOSPITAL | Age: 37
Discharge: HOME | End: 2022-11-27
Attending: STUDENT IN AN ORGANIZED HEALTH CARE EDUCATION/TRAINING PROGRAM
Payer: COMMERCIAL

## 2022-11-27 VITALS
OXYGEN SATURATION: 98 % | SYSTOLIC BLOOD PRESSURE: 127 MMHG | BODY MASS INDEX: 47.27 KG/M2 | TEMPERATURE: 98 F | HEIGHT: 64 IN | HEART RATE: 90 BPM | WEIGHT: 276.88 LBS | DIASTOLIC BLOOD PRESSURE: 95 MMHG | RESPIRATION RATE: 19 BRPM

## 2022-11-27 DIAGNOSIS — G89.29 CHRONIC LOW BACK PAIN, UNSPECIFIED BACK PAIN LATERALITY, UNSPECIFIED WHETHER SCIATICA PRESENT: ICD-10-CM

## 2022-11-27 DIAGNOSIS — R07.89 RIGHT-SIDED CHEST WALL PAIN: Primary | ICD-10-CM

## 2022-11-27 DIAGNOSIS — M54.50 CHRONIC LOW BACK PAIN, UNSPECIFIED BACK PAIN LATERALITY, UNSPECIFIED WHETHER SCIATICA PRESENT: ICD-10-CM

## 2022-11-27 DIAGNOSIS — S22.31XA CLOSED FRACTURE OF ONE RIB OF RIGHT SIDE, INITIAL ENCOUNTER: ICD-10-CM

## 2022-11-27 LAB
B-HCG UR QL: NEGATIVE
CTP QC/QA: YES

## 2022-11-27 PROCEDURE — 99285 EMERGENCY DEPT VISIT HI MDM: CPT | Mod: 25

## 2022-11-27 PROCEDURE — 96372 THER/PROPH/DIAG INJ SC/IM: CPT | Performed by: STUDENT IN AN ORGANIZED HEALTH CARE EDUCATION/TRAINING PROGRAM

## 2022-11-27 PROCEDURE — 81025 URINE PREGNANCY TEST: CPT | Performed by: STUDENT IN AN ORGANIZED HEALTH CARE EDUCATION/TRAINING PROGRAM

## 2022-11-27 PROCEDURE — 25000003 PHARM REV CODE 250: Performed by: STUDENT IN AN ORGANIZED HEALTH CARE EDUCATION/TRAINING PROGRAM

## 2022-11-27 PROCEDURE — 63600175 PHARM REV CODE 636 W HCPCS: Performed by: STUDENT IN AN ORGANIZED HEALTH CARE EDUCATION/TRAINING PROGRAM

## 2022-11-27 RX ORDER — KETOROLAC TROMETHAMINE 30 MG/ML
30 INJECTION, SOLUTION INTRAMUSCULAR; INTRAVENOUS
Status: COMPLETED | OUTPATIENT
Start: 2022-11-27 | End: 2022-11-27

## 2022-11-27 RX ORDER — HYDROCODONE BITARTRATE AND ACETAMINOPHEN 10; 325 MG/1; MG/1
1 TABLET ORAL
Status: COMPLETED | OUTPATIENT
Start: 2022-11-27 | End: 2022-11-27

## 2022-11-27 RX ORDER — OXYCODONE AND ACETAMINOPHEN 10; 325 MG/1; MG/1
1 TABLET ORAL EVERY 8 HOURS PRN
Qty: 16 TABLET | Refills: 0 | Status: SHIPPED | OUTPATIENT
Start: 2022-11-27 | End: 2022-12-04

## 2022-11-27 RX ADMIN — HYDROCODONE BITARTRATE AND ACETAMINOPHEN 1 TABLET: 10; 325 TABLET ORAL at 09:11

## 2022-11-27 RX ADMIN — KETOROLAC TROMETHAMINE 30 MG: 30 INJECTION, SOLUTION INTRAMUSCULAR at 08:11

## 2022-11-27 NOTE — DISCHARGE INSTRUCTIONS
Take deep breaths.  Use incentive spirometer.      Follow-up with pain management.  Please see list of attached providers.    Continue taking medications as prescribed.    Clinic Address Phone # Insurance   [facility] [address] [place] [address] [place] [id number] [phone] Medicare Part B; private insurance; self-pay  (No Medicaid)    [facility]  [address] [place] [phone] Self-pay only  (Not taking new chronic pain patients during COVID)   [facility] [address]  [place], [phone] Medicare as primary; Medicaid only if secondary; Private insurance; self-pay   [facility] [phone] Medicaid, Medicare, most commercial insurances and self pay   [facility]   [address] [facility] [address] [facility]  [place] [id number] [phone] Medicare; private insurance; self-pay  (No Medicaid)    [facility]   [address]  [place], [place] [id number] [phone] Medicare; private insurance   [facility]  [address]  [place] [address] [phone] Medicare as primary; Medicaid only if secondary   Gaurang Castro MD [address], [phone] Medicare (Not all advantage programs); Private insurance; self-pay  (No Wellcare; Medicaid; People's Choice)    Naina Cisneros MD [address] [facility], [place] [id number] [phone] Medicare; private insurance  (No self-pay)    [facility] [address]  [place], [phone] Medicare Community Health Plan (Only); Medicaid (except Healthy Blue and Amerihealth)   (No Aetna)    [facility] [address] [facility] [address]  [place], [phone] Medicare (except Healthy Blue); Medicaid (only if secondary)  (No self-pay)   Artis Mayorga MD [address] [facility], [place] [id number] [phone] Medicare; private  insurance  (No self-pay)   Lele Benson MD [address]  [place], [phone] Medicaid PPOs; Medicare PPOs   Olivia Molina  [address]  [place] [address] [phone] Self-pay only  (No Medicaid; Medicare; Private insurance)
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
## 2022-11-29 NOTE — PROGRESS NOTES
Spoke with Linette at Dr. Urena's office to follow up on status of pain management referral. She reported that pt has been referral to multiple providers but due to previous discharge from other pain management providers, they are having difficulty finding one to accept pt. Emailed list of pain management providers in case their are additional physicians they can make referral to. Spoke with pt as part of complex care plan. She reported she is on waitlist for [facility] for pain management. She stated that she is awaiting establishment with PCP at [facility]. She reported not having stairs in the home. Her wife assists her at home with ADLs if needed. She has a cane and a walker. She is in the process of looking for a new neuorsurgeon as there was a mention of possible surgical intervention. She reported having PT in the past and does not feel its needed again at this time.

## 2022-12-27 NOTE — PROGRESS NOTES
Contacted pt as part of complex care plan. She reported she had not had a change to look into pain management list sent or new neurosurgeon. Denied needs at this time.

## 2023-01-05 ENCOUNTER — HOSPITAL ENCOUNTER (EMERGENCY)
Facility: HOSPITAL | Age: 38
Discharge: HOME | End: 2023-01-05
Attending: EMERGENCY MEDICINE
Payer: COMMERCIAL

## 2023-01-05 VITALS
WEIGHT: 280 LBS | BODY MASS INDEX: 47.8 KG/M2 | TEMPERATURE: 97 F | DIASTOLIC BLOOD PRESSURE: 86 MMHG | HEART RATE: 95 BPM | OXYGEN SATURATION: 100 % | HEIGHT: 64 IN | SYSTOLIC BLOOD PRESSURE: 142 MMHG | RESPIRATION RATE: 14 BRPM
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
## 2023-01-05 DIAGNOSIS — S20.211A CONTUSION OF RIB ON RIGHT SIDE, INITIAL ENCOUNTER: Primary | ICD-10-CM

## 2023-01-05 LAB — B-HCG SERPL QL: NEGATIVE

## 2023-01-05 PROCEDURE — 25000003 PHARM REV CODE 250

## 2023-01-05 PROCEDURE — 81025 URINE PREGNANCY TEST: CPT

## 2023-01-05 PROCEDURE — 99284 EMERGENCY DEPT VISIT MOD MDM: CPT | Mod: 25

## 2023-01-05 RX ORDER — HYDROCODONE BITARTRATE AND ACETAMINOPHEN 5; 325 MG/1; MG/1
1 TABLET ORAL EVERY 6 HOURS PRN
Qty: 6 TABLET | Refills: 0 | Status: SHIPPED | OUTPATIENT
Start: 2023-01-05 | End: 2023-01-07

## 2023-01-05 RX ORDER — HYDROCODONE BITARTRATE AND ACETAMINOPHEN 5; 325 MG/1; MG/1
1 TABLET ORAL
Status: COMPLETED | OUTPATIENT
Start: 2023-01-05 | End: 2023-01-05

## 2023-01-05 RX ADMIN — HYDROCODONE BITARTRATE AND ACETAMINOPHEN 1 TABLET: 5; 325 TABLET ORAL at 04:01

## 2023-01-05 NOTE — ED PROVIDER NOTES
Encounter Date: 1/5/2023       History     Chief Complaint   Patient presents with    Fall     Pt to er c/o h/a and right rib area pain s/p fall on this morning.     Patient is a 37 year old female with no reported significant PMH who presents to ER with c/o right rib pain and severe headache after fall this morning. Patient reports she was getting out the bath tub when she slipped and fell. States that her right ribs hit the tub and her head struck tile floor. She denies LOC, neck pain, or numbness/tingling to extremities. She denies nausea or vomiting, changes in vision, or decreased sensation to extremities.     The history is provided by the patient. No  was used.   Fall  The accident occurred 3 to 5 hours ago. The fall occurred while walking. She fell from a height of 3 to 5 ft. She landed on A hard floor. There was no blood loss. The point of impact was the head (right ribs). The pain is present in the head (right lower rib pain). The pain is at a severity of 7/10. She was Ambulatory at the scene. There was No entrapment after the fall. There was No drug use involved in the accident. There was No alcohol use involved in the accident. Associated symptoms include headaches. Pertinent negatives include no neck pain, no back pain, no paralysis, no visual change, no fever, no numbness, no abdominal pain, no bowel incontinence, no nausea, no vomiting, no hematuria, no hearing loss, no loss of consciousness and no tingling. The symptoms are aggravated by activity. She has tried rest and acetaminophen for the symptoms. The treatment provided no relief.   Review of patient's allergies indicates:   Allergen Reactions    Nsaids (non-steroidal anti-inflammatory drug) Other (See Comments)     Hx of bariatric sx was told not to take oral nsaids  Not a true allergy, pt reports can not take orally due to surgery     Past Medical History:   Diagnosis Date    Chronic back pain     H/O gastric sleeve       Past Surgical History:   Procedure Laterality Date    abominal plasty      BACK SURGERY      BARIATRIC SURGERY      gastric sleeve      INJECTION OF FACET JOINT Bilateral 08/02/2022    Procedure: bilateral lumbar facet injection L4-5 and L5-S1 (Iv Sedation);  Surgeon: Robert Reynolds Jr., MD;  Location: [facility];  Service: Pain Management;  Laterality: Bilateral;    SPINAL FUSION       History reviewed. No pertinent family history.  Social History     Tobacco Use    Smoking status: Never    Smokeless tobacco: Never   Substance Use Topics    Alcohol use: Never    Drug use: Never     Review of Systems   Constitutional:  Negative for chills and fever.   HENT:  Negative for congestion, postnasal drip and sore throat.    Eyes:  Negative for photophobia and visual disturbance.   Respiratory:  Negative for chest tightness and shortness of breath.    Cardiovascular:  Negative for chest pain.        Right lower rib pain   Gastrointestinal:  Negative for abdominal pain, bowel incontinence, nausea and vomiting.   Genitourinary:  Negative for difficulty urinating, dysuria, hematuria and urgency.   Musculoskeletal:  Negative for back pain and neck pain.        Right sided rib pain   Skin:  Negative for rash.   Neurological:  Positive for headaches. Negative for dizziness, tingling, tremors, loss of consciousness, syncope, speech difficulty, weakness and numbness.   Hematological:  Does not bruise/bleed easily.   Psychiatric/Behavioral:  Negative for behavioral problems.    All other systems reviewed and are negative.    Physical Exam     Initial Vitals [01/05/23 1431]   BP Pulse Resp Temp SpO2   (!) 142/86 95 20 97 °F (36.1 °C) 100 %      MAP       --         Physical Exam    Nursing note and vitals reviewed.  Constitutional: She appears well-developed and well-nourished.   HENT:   Head: Normocephalic.   Right Ear: Hearing and tympanic membrane normal.   Left Ear: Hearing and tympanic membrane normal.   Nose: Nose  normal.   Mouth/Throat: Uvula is midline, oropharynx is clear and moist and mucous membranes are normal.   Eyes: Conjunctivae and EOM are normal. Pupils are equal, round, and reactive to light.   Neck: Neck supple.   Normal range of motion.   Full passive range of motion without pain.     Cardiovascular:  Regular rhythm, normal heart sounds and normal pulses.           Pulmonary/Chest: Effort normal and breath sounds normal. She exhibits tenderness.   Right lower rib tenderness   Abdominal: Abdomen is soft. Bowel sounds are normal. There is no abdominal tenderness.   Musculoskeletal:      Cervical back: Normal, full passive range of motion without pain, normal range of motion and neck supple. No rigidity. No spinous process tenderness or muscular tenderness. Normal range of motion.      Thoracic back: Normal.      Lumbar back: Normal.     Lymphadenopathy:     She has no cervical adenopathy.   Neurological: She is alert. GCS eye subscore is 4. GCS verbal subscore is 5. GCS motor subscore is 6.   Skin: Skin is warm, dry and intact. Capillary refill takes less than 2 seconds.       ED Course   Procedures  Labs Reviewed   PREGNANCY TEST, URINE RAPID - Normal          Imaging Results              CT Head Without Contrast (Final result)  Result time 01/05/23 16:39:27      Final result by Luisa Curry MD (01/05/23 16:39:27)                   Impression:      No acute intracranial abnormality.      Electronically signed by: Luisa Curry  Date:    01/05/2023  Time:    16:39               Narrative:    EXAMINATION:  CT HEAD WITHOUT CONTRAST    CLINICAL HISTORY:  Headache, sudden, severe;    TECHNIQUE:  Axial scans were obtained from skull base to the vertex.    Coronal and sagittal reconstructions obtained from the axial data.    Automatic exposure control was utilized to limit radiation dose.    Contrast: None    Radiation Dose:    Total DLP: 838 mGy*cm    COMPARISON:  None    FINDINGS:  There is no acute  intracranial hemorrhage or edema. The gray-white matter differentiation is preserved.    There is no mass effect or midline shift. The ventricles and sulci are normal in size. The basal cisterns are patent. There is no abnormal extra-axial fluid collection.    The calvarium and skull base are intact. The visualized paranasal sinuses and the mastoid air cells are clear.                                       XR Ribs Min 3 Views w/PA Chest Right (Final result)  Result time 01/05/23 16:39:10      Final result by Michelle Parks MD (01/05/23 16:39:10)                   Impression:      No appreciable acute rib fracture.      Electronically signed by: Michelle Parks  Date:    01/05/2023  Time:    16:39               Narrative:    EXAMINATION:  XR RIBS MIN 3 VIEWS W/ PA CHEST RIGHT    CLINICAL HISTORY:  Fall;    TECHNIQUE:  Frontal view of the chest and frontal and oblique views of the right ribs were obtained.    COMPARISON:  11/27/2022    FINDINGS:  The cardiac silhouette is normal. The lungs are clear. No lobar consolidation. No pleural effusion or pneumothorax.    There are old fractures of the right 1st, 3rd, 4th and 7th ribs.  No appreciable acute fracture.                                       Medications   HYDROcodone-acetaminophen 5-325 mg per tablet 1 tablet (1 tablet Oral Given 1/5/23 1630)     Medical Decision Making:   Initial Assessment:   Awake and alert, NAD.  Differential Diagnosis:   Fall, rib contusion, rib fracture, concussion, SDH  Clinical Tests:   Lab Tests: Ordered and Reviewed  Radiological Study: Ordered and Reviewed  ED Management:  MDM  History obtained from patient.  Co-morbidities and/or factors adding to the complexity or risk for the patient?: NONE  Differential diagnoses: Fall, rib contusion, rib fracture, concussion, SDH  Decision to obtain previous or outside records?: Reviewed previous records from [facility] 3 days ago for fall. Patient has had more than 10 ER visits for falls.   Chart  Review (nursing home, outside records, CareEverywhere): Reviewed chart. Patient had fall out of bathtub 3 days ago and was seen at [facility]. She has same complaints that she has today.   Review of RX medications/new RX prescribed by me?: No new prescriptions. Patient was prescribed tramadol and muscle relaxer 3 days ago.   My EKG Interpretation: see above  Labs/imaging/other tests obtained/considered (risk/benefits of testing discussed): XR ribs right, CT head  Labs/tests intepretation: No acute findings  My independent imaging interpretation:   Treatment/interventions, IV fluids, IV medications: PO norco given for pain   Complex management (IV controlled substances, went to OR, DNR, meds requiring monitoring, transfer, etc)?: NONE  Workup/treatment affected by social determinants of health?: none   Point of care US done/interpretation:   Consults/radiologist/EMS/social work/family discussion/alternate history:   Advanced care planning/end of life discussion: NONE  Shared decision making: Shared decision making utilized with patient. She wants CT head and repeat XR of right ribs. Discussed risks with patient. She wishes to proceed.   ETOH/smoking/drug cessation discussion: N/A  Dispo: Discharge home.             ED Course as of 01/05/23 1703   Thu Jan 05, 2023   1657 Patient feeling better after norco. Will dc home. Discussed results with patient.  [LM]   1701 Patient asking about pain medications to go home with. She states she can not take NSAIDS. She was prescribed tramadol and tizandidine 3 days ago. Encouraged use of those medications for pain. She is amendable.  [LM]      ED Course User Index  [LM] Deng White NP                 Clinical Impression:   Final diagnoses:  [W19.XXXA] Fall  [S20.211A] Contusion of rib on right side, initial encounter (Primary)        ED Disposition Condition    Discharge Stable          ED Prescriptions    None       Follow-up Information       Follow up With Specialties Details  Why Contact Info    Riana Urena MD Neurosurgery Schedule an appointment as soon as possible for a visit   [address]  [facility] [id number] [phone]               Deng White NP  01/05/23 5476

## 2023-01-07 ENCOUNTER — HOSPITAL ENCOUNTER (EMERGENCY)
Facility: HOSPITAL | Age: 38
Discharge: HOME | End: 2023-01-07
Attending: EMERGENCY MEDICINE
Payer: COMMERCIAL

## 2023-01-07 VITALS
WEIGHT: 280 LBS | RESPIRATION RATE: 18 BRPM | HEIGHT: 64 IN | HEART RATE: 92 BPM | DIASTOLIC BLOOD PRESSURE: 89 MMHG | TEMPERATURE: 98 F | BODY MASS INDEX: 47.8 KG/M2 | SYSTOLIC BLOOD PRESSURE: 124 MMHG | OXYGEN SATURATION: 100 %

## 2023-01-07 DIAGNOSIS — G44.319 ACUTE POST-TRAUMATIC HEADACHE, NOT INTRACTABLE: ICD-10-CM

## 2023-01-07 DIAGNOSIS — S20.211D RIB CONTUSION, RIGHT, SUBSEQUENT ENCOUNTER: ICD-10-CM

## 2023-01-07 DIAGNOSIS — S06.0X9D CONCUSSION WITH LOSS OF CONSCIOUSNESS, SUBSEQUENT ENCOUNTER: Primary | ICD-10-CM

## 2023-01-07 PROCEDURE — 63600175 PHARM REV CODE 636 W HCPCS: Performed by: EMERGENCY MEDICINE

## 2023-01-07 PROCEDURE — 99284 EMERGENCY DEPT VISIT MOD MDM: CPT

## 2023-01-07 PROCEDURE — 96372 THER/PROPH/DIAG INJ SC/IM: CPT | Performed by: EMERGENCY MEDICINE

## 2023-01-07 RX ORDER — BUTALBITAL, ACETAMINOPHEN AND CAFFEINE 50; 325; 40 MG/1; MG/1; MG/1
2 TABLET ORAL EVERY 4 HOURS PRN
Qty: 30 TABLET | Refills: 1 | Status: SHIPPED | OUTPATIENT
Start: 2023-01-07 | End: 2023-02-06

## 2023-01-07 RX ORDER — FENTANYL CITRATE 50 UG/ML
100 INJECTION, SOLUTION INTRAMUSCULAR; INTRAVENOUS
Status: COMPLETED | OUTPATIENT
Start: 2023-01-07 | End: 2023-01-07

## 2023-01-07 RX ORDER — BUTALBITAL, ACETAMINOPHEN AND CAFFEINE 50; 325; 40 MG/1; MG/1; MG/1
2 TABLET ORAL EVERY 4 HOURS PRN
Qty: 30 TABLET | Refills: 1 | Status: SHIPPED | OUTPATIENT
Start: 2023-01-07 | End: 2023-01-07 | Stop reason: SDUPTHER

## 2023-01-07 RX ADMIN — FENTANYL CITRATE 100 MCG: 0.05 INJECTION, SOLUTION INTRAMUSCULAR; INTRAVENOUS at 12:01

## 2023-01-07 NOTE — ED PROVIDER NOTES
Encounter Date: 1/7/2023       History     Chief Complaint   Patient presents with    head paiin     Pt c/o continued head and right rib pain s/p fall on Thursday. States had +loc on Thursday. Seen here after fall Thursday.     The history is provided by the patient. No  was used.   Headache   This is a new problem. The current episode started in the past 7 days. The problem occurs constantly. The problem has been unchanged. The pain is located in the Bilateral region. The pain does not radiate. The pain quality is not similar to prior headaches. The quality of the pain is described as throbbing. Pertinent negatives include no back pain, fever, nausea, sore throat or weakness. Nothing aggravates the symptoms. She has tried acetaminophen and oral narcotics for the symptoms. The treatment provided no relief. Her past medical history is significant for obesity and recent head traumas.   Pt seen here 2 days ago after fall.  Reportedly hit head and sustained brief loss of consciousness.  Also having rib pain.  Head CT and rib xrays were negative 2 days ago.  Complains of continued pain.    Review of patient's allergies indicates:   Allergen Reactions    Nsaids (non-steroidal anti-inflammatory drug) Other (See Comments)     Hx of bariatric sx was told not to take oral nsaids  Not a true allergy, pt reports can not take orally due to surgery     Past Medical History:   Diagnosis Date    Chronic back pain     H/O gastric sleeve      Past Surgical History:   Procedure Laterality Date    abominal plasty      BACK SURGERY      BARIATRIC SURGERY      gastric sleeve      INJECTION OF FACET JOINT Bilateral 08/02/2022    Procedure: bilateral lumbar facet injection L4-5 and L5-S1 (Iv Sedation);  Surgeon: Robert Reynolds Jr., MD;  Location: [facility];  Service: Pain Management;  Laterality: Bilateral;    SPINAL FUSION       No family history on file.  Social History     Tobacco Use    Smoking status: Never     Smokeless tobacco: Never   Substance Use Topics    Alcohol use: Never    Drug use: Never     Review of Systems   Constitutional:  Negative for fever.   HENT:  Negative for sore throat.    Respiratory:  Negative for shortness of breath.    Cardiovascular:  Negative for chest pain.   Gastrointestinal:  Negative for nausea.   Genitourinary:  Negative for dysuria.   Musculoskeletal:  Negative for back pain.   Skin:  Negative for rash.   Neurological:  Positive for headaches. Negative for weakness.   Hematological:  Does not bruise/bleed easily.     Physical Exam     Initial Vitals [01/07/23 1210]   BP Pulse Resp Temp SpO2   124/89 92 20 97.5 °F (36.4 °C) 100 %      MAP       --         Physical Exam    Nursing note and vitals reviewed.  Constitutional: She appears well-developed and well-nourished.   HENT:   Head: Normocephalic and atraumatic.   Right Ear: External ear normal.   Left Ear: External ear normal.   Eyes: Conjunctivae and EOM are normal. Pupils are equal, round, and reactive to light.   Neck: Neck supple.   Normal range of motion.  Cardiovascular:  Normal rate, regular rhythm, normal heart sounds and intact distal pulses.           Pulmonary/Chest: Breath sounds normal.   Abdominal: Abdomen is soft. Bowel sounds are normal.   Musculoskeletal:         General: Normal range of motion.      Cervical back: Normal range of motion and neck supple.     Neurological: She is alert and oriented to person, place, and time. GCS score is 15. GCS eye subscore is 4. GCS verbal subscore is 5. GCS motor subscore is 6.   Skin: Skin is warm and dry. Capillary refill takes less than 2 seconds.   Psychiatric: She has a normal mood and affect. Her behavior is normal. Judgment and thought content normal.       ED Course   Procedures  Labs Reviewed - No data to display       Imaging Results    None      reviewed - she has received prescriptions for 15 controlled substances in the past 3 months.  Her NarxScore is 630.  She  sees a pain management MD, but he only prescribes her Lyrica and Zanaflex and performs AGNES's periodically.  She claims to have an appointment with her neurosurgeon next week.  Will give IM analgesic in the ED and try Fioricet for home use.     Medications   fentaNYL injection 100 mcg (has no administration in time range)                              Clinical Impression:   Final diagnoses:  [S06.0X9D] Concussion with loss of consciousness, subsequent encounter (Primary)  [G44.319] Acute post-traumatic headache, not intractable  [S20.211D] Rib contusion, right, subsequent encounter        ED Disposition Condition    Discharge Stable          ED Prescriptions       Medication Sig Dispense Start Date End Date Auth. Provider    butalbital-acetaminophen-caffeine -40 mg (FIORICET, ESGIC) -40 mg per tablet Take 2 tablets by mouth every 4 (four) hours as needed for Headaches (max. 6 tablets in 24 hour period). 30 tablet 1/7/2023 2/6/2023 Garrick Eldridge MD          Follow-up Information       Follow up With Specialties Details Why Contact Info    Follow up with your primary MD in 3-5 days if not improved.  Return to ED for worsening symptoms.                 Garrick Eldridge MD  01/07/23 8778

## 2023-01-07 NOTE — ED TRIAGE NOTES
Pt c/o continued head and right rib pain s/p fall on Thursday. States had +loc on Thursday. Seen here after fall Thursday.

## 2023-01-09 ENCOUNTER — HOSPITAL ENCOUNTER (EMERGENCY)
Facility: HOSPITAL | Age: 38
Discharge: HOME | End: 2023-01-09
Attending: FAMILY MEDICINE
Payer: COMMERCIAL

## 2023-01-09 VITALS
SYSTOLIC BLOOD PRESSURE: 144 MMHG | TEMPERATURE: 99 F | OXYGEN SATURATION: 98 % | DIASTOLIC BLOOD PRESSURE: 74 MMHG | RESPIRATION RATE: 19 BRPM | WEIGHT: 280 LBS | HEART RATE: 72 BPM | BODY MASS INDEX: 47.8 KG/M2 | HEIGHT: 64 IN

## 2023-01-09 DIAGNOSIS — M25.562 ACUTE PAIN OF LEFT KNEE: ICD-10-CM

## 2023-01-09 DIAGNOSIS — R07.81 RIB PAIN: ICD-10-CM

## 2023-01-09 DIAGNOSIS — R51.9 NONINTRACTABLE HEADACHE, UNSPECIFIED CHRONICITY PATTERN, UNSPECIFIED HEADACHE TYPE: Primary | ICD-10-CM

## 2023-01-09 PROCEDURE — 96374 THER/PROPH/DIAG INJ IV PUSH: CPT

## 2023-01-09 PROCEDURE — 96375 TX/PRO/DX INJ NEW DRUG ADDON: CPT

## 2023-01-09 PROCEDURE — 25000003 PHARM REV CODE 250

## 2023-01-09 PROCEDURE — 99284 EMERGENCY DEPT VISIT MOD MDM: CPT | Mod: 25

## 2023-01-09 PROCEDURE — 63600175 PHARM REV CODE 636 W HCPCS

## 2023-01-09 PROCEDURE — 96361 HYDRATE IV INFUSION ADD-ON: CPT

## 2023-01-09 RX ORDER — DIPHENHYDRAMINE HYDROCHLORIDE 50 MG/ML
25 INJECTION INTRAMUSCULAR; INTRAVENOUS
Status: COMPLETED | OUTPATIENT
Start: 2023-01-09 | End: 2023-01-09

## 2023-01-09 RX ORDER — PROCHLORPERAZINE EDISYLATE 5 MG/ML
10 INJECTION INTRAMUSCULAR; INTRAVENOUS
Status: COMPLETED | OUTPATIENT
Start: 2023-01-09 | End: 2023-01-09

## 2023-01-09 RX ORDER — KETOROLAC TROMETHAMINE 30 MG/ML
15 INJECTION, SOLUTION INTRAMUSCULAR; INTRAVENOUS
Status: COMPLETED | OUTPATIENT
Start: 2023-01-09 | End: 2023-01-09

## 2023-01-09 RX ORDER — ONDANSETRON 2 MG/ML
4 INJECTION INTRAMUSCULAR; INTRAVENOUS
Status: COMPLETED | OUTPATIENT
Start: 2023-01-09 | End: 2023-01-09

## 2023-01-09 RX ORDER — MORPHINE SULFATE 4 MG/ML
4 INJECTION, SOLUTION INTRAMUSCULAR; INTRAVENOUS
Status: COMPLETED | OUTPATIENT
Start: 2023-01-09 | End: 2023-01-09

## 2023-01-09 RX ORDER — SODIUM CHLORIDE 9 MG/ML
1000 INJECTION, SOLUTION INTRAVENOUS
Status: COMPLETED | OUTPATIENT
Start: 2023-01-09 | End: 2023-01-09

## 2023-01-09 RX ORDER — DEXAMETHASONE SODIUM PHOSPHATE 4 MG/ML
8 INJECTION, SOLUTION INTRA-ARTICULAR; INTRALESIONAL; INTRAMUSCULAR; INTRAVENOUS; SOFT TISSUE
Status: COMPLETED | OUTPATIENT
Start: 2023-01-09 | End: 2023-01-09

## 2023-01-09 RX ADMIN — DEXAMETHASONE SODIUM PHOSPHATE 8 MG: 4 INJECTION, SOLUTION INTRA-ARTICULAR; INTRALESIONAL; INTRAMUSCULAR; INTRAVENOUS; SOFT TISSUE at 08:01

## 2023-01-09 RX ADMIN — MORPHINE SULFATE 4 MG: 4 INJECTION INTRAVENOUS at 09:01

## 2023-01-09 RX ADMIN — KETOROLAC TROMETHAMINE 15 MG: 30 INJECTION, SOLUTION INTRAMUSCULAR; INTRAVENOUS at 08:01

## 2023-01-09 RX ADMIN — DIPHENHYDRAMINE HYDROCHLORIDE 25 MG: 50 INJECTION, SOLUTION INTRAMUSCULAR; INTRAVENOUS at 08:01

## 2023-01-09 RX ADMIN — PROCHLORPERAZINE EDISYLATE 10 MG: 5 INJECTION INTRAMUSCULAR; INTRAVENOUS at 08:01

## 2023-01-09 RX ADMIN — ONDANSETRON HYDROCHLORIDE 4 MG: 2 SOLUTION INTRAMUSCULAR; INTRAVENOUS at 09:01

## 2023-01-09 RX ADMIN — SODIUM CHLORIDE 1000 ML: 9 INJECTION, SOLUTION INTRAVENOUS at 08:01

## 2023-01-10 NOTE — ED TRIAGE NOTES
Pt states that rib and headaches are not improved adfter a fall with previous visit x 2 in ed. Pt has appt with PCP Dr Mullen on 1/13/23 for follow up

## 2023-01-10 NOTE — DISCHARGE INSTRUCTIONS
Thanks for letting us take care of you today!  It is our goal to give you courteous care and to keep you comfortable and informed, if you have any questions before you leave I will be happy to try and answer them.    Here is some advice after your visit:      Your visit in the emergency department is NOT definitive care - please follow-up with your primary care doctor and/or specialist within 1 week.  Please return if you have any worsening in your condition or if you have any other concerns.    While in the Emergency Department you received medication that may cause drowsiness, dizziness, impaired judgment, and reduced physical capabilities. You should not drive, operate heavy machinery, swim, or make life  changing decisions within 24 hours of receiving this medication.

## 2023-01-10 NOTE — ED PROVIDER NOTES
Encounter Date: 1/9/2023       History     Chief Complaint   Patient presents with    Recheck     Pt states that rib and headaches are not improved adfter a fall with previous visit x 2 in ed     37 y.o. White female with a history of chronic back pain presents to Emergency Department with a chief complaint of headache. Symptoms began several days after fall and have been constant since onset. Patient seen at multiple facilities regarding complaints with negative workup, reports symptoms are not improving. Associated symptoms include light sensitivity, L knee pain, and R rib pain. Symptoms are aggravated with exertion and palpation and there are no alleviating factors. The patient denies CP, SOB, weakness, fever, chills, or vomiting. She reports taking Fioricet prior to arrival with no relief of symptoms. No other reported symptoms at this time      The history is provided by the patient. No  was used.   Headache   This is a recurrent problem. The current episode started 1 to 4 weeks ago. The problem occurs daily. The problem has been unchanged. The pain does not radiate. Associated symptoms include photophobia. Pertinent negatives include no abdominal pain, blurred vision, dizziness, fever, nausea, seizures or weakness. The symptoms are aggravated by bright light. She has tried acetaminophen and oral narcotics for the symptoms. The treatment provided no relief.   Review of patient's allergies indicates:   Allergen Reactions    Nsaids (non-steroidal anti-inflammatory drug) Other (See Comments)     Hx of bariatric sx was told not to take oral nsaids  Not a true allergy, pt reports can not take orally due to surgery     Past Medical History:   Diagnosis Date    Chronic back pain     H/O gastric sleeve      Past Surgical History:   Procedure Laterality Date    abominal plasty      BACK SURGERY      BARIATRIC SURGERY      gastric sleeve      INJECTION OF FACET JOINT Bilateral 08/02/2022    Procedure:  bilateral lumbar facet injection L4-5 and L5-S1 (Iv Sedation);  Surgeon: Robert Reynolds Jr., MD;  Location: [facility];  Service: Pain Management;  Laterality: Bilateral;    SPINAL FUSION       No family history on file.  Social History     Tobacco Use    Smoking status: Never    Smokeless tobacco: Never   Substance Use Topics    Alcohol use: Never    Drug use: Never     Review of Systems   Constitutional:  Negative for chills, fatigue and fever.   Eyes:  Positive for photophobia. Negative for blurred vision.   Respiratory:  Negative for shortness of breath, wheezing and stridor.    Cardiovascular:  Negative for chest pain and leg swelling.   Gastrointestinal:  Negative for abdominal pain and nausea.   Neurological:  Positive for headaches. Negative for dizziness, seizures and weakness.   All other systems reviewed and are negative.    Physical Exam     Initial Vitals [01/09/23 1858]   BP Pulse Resp Temp SpO2   135/88 90 18 98.6 °F (37 °C) 100 %      MAP       --         Physical Exam    Nursing note and vitals reviewed.  Constitutional: Vital signs are normal. She appears well-developed and well-nourished. She is not diaphoretic. She is cooperative.  Non-toxic appearance. No distress.   HENT:   Head: Normocephalic and atraumatic.   Right Ear: External ear normal.   Left Ear: External ear normal.   Nose: Nose normal.   Mouth/Throat: Oropharynx is clear and moist. No oropharyngeal exudate.   Eyes: Conjunctivae and EOM are normal. Pupils are equal, round, and reactive to light. Right eye exhibits no discharge. Left eye exhibits no discharge.   Neck: Neck supple. No JVD present.   Normal range of motion.  Cardiovascular:  Normal rate, regular rhythm, S1 normal, S2 normal, normal heart sounds, intact distal pulses and normal pulses.           Pulmonary/Chest: Effort normal and breath sounds normal. No accessory muscle usage or stridor. No tachypnea. No respiratory distress. She has no decreased breath sounds. She  has no wheezes. She has no rhonchi. She has no rales. She exhibits tenderness.   Abdominal: Abdomen is soft. Bowel sounds are normal. She exhibits no distension. There is no abdominal tenderness. There is no guarding.   Musculoskeletal:         General: Tenderness present. No edema. Normal range of motion.      Cervical back: Normal range of motion and neck supple.      Right knee: Normal.      Left knee: No swelling or deformity. Normal range of motion. Tenderness present.      Comments: Tenderness noted to R ribs and L knee. Full 5/5 ROM noted to all 4 extremities. All other adjacent joints otherwise normal.        Neurological: She is alert and oriented to person, place, and time. She has normal strength. No sensory deficit. GCS score is 15. GCS eye subscore is 4. GCS verbal subscore is 5. GCS motor subscore is 6.   Skin: Skin is warm and dry. Capillary refill takes less than 2 seconds. No rash noted. No erythema.   Psychiatric: She has a normal mood and affect. Thought content normal.       ED Course   Procedures  Labs Reviewed - No data to display       Imaging Results    None          Medications   0.9%  NaCl infusion (0 mLs Intravenous Stopped 1/9/23 2151)   prochlorperazine injection Soln 10 mg (10 mg Intravenous Given 1/9/23 2051)   diphenhydrAMINE injection 25 mg (25 mg Intravenous Given 1/9/23 2053)   ketorolac injection 15 mg (15 mg Intravenous Given 1/9/23 2050)   dexAMETHasone injection 8 mg (8 mg Intravenous Given 1/9/23 2049)   morphine injection 4 mg (4 mg Intravenous Given 1/9/23 2131)   ondansetron injection 4 mg (4 mg Intravenous Given 1/9/23 2130)     Medical Decision Making:   History:   Old Records Summarized: records from another hospital, records from previous admission(s) and other records.       <> Summary of Records: Patient has been seen at [facility], [facility], [facility], and [facility] recently for same complaint with negative work up. Patient has been prescribed narcotics and Fioricet for  pain.  reviewed; Her NarxScore is 620.   Initial Assessment:   Patient awake, alert, non-labored breathing, and answers questions appropriately. Tenderness to R rib and L knee. No slurred speech, facial droop, extremity weakness, or confusion noted.   Differential Diagnosis:   Migraine, Headache, Post concussion syndrome.   ED Management:  Co-morbidities and/or factors adding to the complexity or risk for the patient?: recent fall  Differential diagnoses: Migraine, Headache, Post concussion syndrome  Decision to obtain previous or outside records?: Yes, I reviewed patient's most recent visits at multiple ERs.   Chart Review (nursing home, outside records, CareEverywhere): yes  Review of RX medications/new RX prescribed by me?: Patient currently taking Fioricet   Labs/imaging/other tests obtained/considered (risk/benefits of testing discussed): none  Labs/tests intepretation: none  My independent imaging interpretation: none  Treatment/interventions, IV fluids, IV medications: IV Decadron, Benadryl, Normal Saline, Toradol, Compazine, Zofran, and Morphine.   Complex management (IV controlled substances, went to OR, DNR, meds requiring monitoring, transfer, etc)?: none  Workup/treatment affected by social determinants of health?:none   Point of care US done/interpretation: none  Consults/radiologist/EMS/social work/family discussion/alternate history: none  Advanced care planning/end of life discussion: none  Shared decision making: Discussed plan of care and interventions with patient. Agreed to plan. Also, discussed multiple ER visits and  with patient. Informed her that it is important for her to get a PCP to assist with ongoing symptoms.  ETOH/smoking/drug cessation discussion: none  Dispo: Patient discharged home. Patient denies new or additional complaints; no further tests indicated at this time. Verbalized understanding of instructions. No emergent or apparent distress noted prior to discharge. To follow  up with PCP in 1 week as needed.                ED Course as of 01/09/23 2205 Mon Jan 09, 2023 2128 Patient reports symptoms have improved mildly.  [JA]      ED Course User Index  [JA] Ladonna Fair NP                 Clinical Impression:   Final diagnoses:  [R51.9] Nonintractable headache, unspecified chronicity pattern, unspecified headache type (Primary)  [R07.81] Rib pain  [M25.562] Acute pain of left knee        ED Disposition Condition    Discharge Stable          ED Prescriptions    None       Follow-up Information       Follow up With Specialties Details Why Contact Info    Riana Urena MD Neurosurgery Call in 1 week If symptoms worsen, As needed [address]  [facility] [id number] [phone]      [facility] - Emergency Dept Emergency Medicine Go to  If symptoms worsen, As needed [id number]  [facility] [id number] [phone]             Ladonna Fair NP  01/09/23 2206

## 2023-01-14 ENCOUNTER — HOSPITAL ENCOUNTER (EMERGENCY)
Facility: HOSPITAL | Age: 38
Discharge: HOME | End: 2023-01-14
Attending: EMERGENCY MEDICINE
Payer: COMMERCIAL

## 2023-01-14 VITALS
TEMPERATURE: 98 F | HEART RATE: 55 BPM | HEIGHT: 64 IN | RESPIRATION RATE: 18 BRPM | BODY MASS INDEX: 46.79 KG/M2 | OXYGEN SATURATION: 100 % | DIASTOLIC BLOOD PRESSURE: 74 MMHG | SYSTOLIC BLOOD PRESSURE: 108 MMHG | WEIGHT: 274.06 LBS

## 2023-01-14 DIAGNOSIS — G89.29 CHRONIC BILATERAL LOW BACK PAIN WITH BILATERAL SCIATICA: Primary | ICD-10-CM

## 2023-01-14 DIAGNOSIS — M54.41 CHRONIC BILATERAL LOW BACK PAIN WITH BILATERAL SCIATICA: Primary | ICD-10-CM

## 2023-01-14 DIAGNOSIS — M54.42 CHRONIC BILATERAL LOW BACK PAIN WITH BILATERAL SCIATICA: Primary | ICD-10-CM

## 2023-01-14 LAB
B-HCG UR QL: NEGATIVE
CTP QC/QA: YES

## 2023-01-14 PROCEDURE — 63600175 PHARM REV CODE 636 W HCPCS: Performed by: PHYSICIAN ASSISTANT

## 2023-01-14 PROCEDURE — 99284 EMERGENCY DEPT VISIT MOD MDM: CPT

## 2023-01-14 PROCEDURE — 96372 THER/PROPH/DIAG INJ SC/IM: CPT | Performed by: PHYSICIAN ASSISTANT

## 2023-01-14 PROCEDURE — 81025 URINE PREGNANCY TEST: CPT | Performed by: PHYSICIAN ASSISTANT

## 2023-01-14 PROCEDURE — 25000003 PHARM REV CODE 250: Performed by: PHYSICIAN ASSISTANT

## 2023-01-14 RX ORDER — TRAZODONE HYDROCHLORIDE 50 MG/1
1 TABLET ORAL NIGHTLY
COMMUNITY
End: 2023-07-13 | Stop reason: ALTCHOICE

## 2023-01-14 RX ORDER — HYDROCODONE BITARTRATE AND ACETAMINOPHEN 10; 325 MG/1; MG/1
1 TABLET ORAL
Status: COMPLETED | OUTPATIENT
Start: 2023-01-14 | End: 2023-01-14

## 2023-01-14 RX ORDER — KETOROLAC TROMETHAMINE 30 MG/ML
60 INJECTION, SOLUTION INTRAMUSCULAR; INTRAVENOUS
Status: COMPLETED | OUTPATIENT
Start: 2023-01-14 | End: 2023-01-14

## 2023-01-14 RX ORDER — DULOXETIN HYDROCHLORIDE 60 MG/1
1 CAPSULE, DELAYED RELEASE ORAL EVERY MORNING
COMMUNITY

## 2023-01-14 RX ORDER — DULOXETIN HYDROCHLORIDE 30 MG/1
1 CAPSULE, DELAYED RELEASE ORAL EVERY MORNING
COMMUNITY
End: 2023-07-13 | Stop reason: SDUPTHER

## 2023-01-14 RX ORDER — METHYLPREDNISOLONE 4 MG/1
TABLET ORAL
Qty: 1 EACH | Refills: 0 | Status: SHIPPED | OUTPATIENT
Start: 2023-01-14 | End: 2023-07-13 | Stop reason: ALTCHOICE

## 2023-01-14 RX ADMIN — HYDROCODONE BITARTRATE AND ACETAMINOPHEN 1 TABLET: 10; 325 TABLET ORAL at 06:01

## 2023-01-14 RX ADMIN — KETOROLAC TROMETHAMINE 60 MG: 30 INJECTION, SOLUTION INTRAMUSCULAR; INTRAVENOUS at 06:01

## 2023-01-14 NOTE — Clinical Note
"Carol Crespo" Mayito was seen and treated in our emergency department on 1/14/2023.  She may return to work on 01/15/2023.       If you have any questions or concerns, please don't hesitate to call.       RN    "

## 2023-01-14 NOTE — DISCHARGE INSTRUCTIONS
Take all medications as prescribed.     Continue Physical Therapy, follow up with your neurosurgeon and pain management doctors.    Consider 2nd opinion from another Neurosurgeon.    Return to ER for any changes or worsening of symptoms.

## 2023-01-15 ENCOUNTER — HOSPITAL ENCOUNTER (EMERGENCY)
Facility: HOSPITAL | Age: 38
Discharge: HOME | End: 2023-01-15
Attending: EMERGENCY MEDICINE
Payer: COMMERCIAL

## 2023-01-15 VITALS
WEIGHT: 280 LBS | RESPIRATION RATE: 18 BRPM | BODY MASS INDEX: 47.8 KG/M2 | SYSTOLIC BLOOD PRESSURE: 129 MMHG | HEIGHT: 64 IN | TEMPERATURE: 98 F | OXYGEN SATURATION: 100 % | DIASTOLIC BLOOD PRESSURE: 79 MMHG | HEART RATE: 79 BPM

## 2023-01-15 VITALS
RESPIRATION RATE: 16 BRPM | SYSTOLIC BLOOD PRESSURE: 156 MMHG | HEART RATE: 77 BPM | OXYGEN SATURATION: 97 % | TEMPERATURE: 98 F | DIASTOLIC BLOOD PRESSURE: 89 MMHG

## 2023-01-15 DIAGNOSIS — R29.6 RECURRENT FALLS WHILE WALKING: Primary | ICD-10-CM
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
## 2023-01-15 DIAGNOSIS — G89.29 ACUTE EXACERBATION OF CHRONIC LOW BACK PAIN: Primary | ICD-10-CM

## 2023-01-15 DIAGNOSIS — M54.50 ACUTE EXACERBATION OF CHRONIC LOW BACK PAIN: Primary | ICD-10-CM

## 2023-01-15 PROCEDURE — 25000003 PHARM REV CODE 250: Performed by: EMERGENCY MEDICINE

## 2023-01-15 PROCEDURE — 63600175 PHARM REV CODE 636 W HCPCS: Performed by: EMERGENCY MEDICINE

## 2023-01-15 PROCEDURE — 99284 EMERGENCY DEPT VISIT MOD MDM: CPT | Mod: 25

## 2023-01-15 PROCEDURE — 96372 THER/PROPH/DIAG INJ SC/IM: CPT | Performed by: EMERGENCY MEDICINE

## 2023-01-15 PROCEDURE — 99284 EMERGENCY DEPT VISIT MOD MDM: CPT | Mod: 25,27

## 2023-01-15 RX ORDER — HYDROCODONE BITARTRATE AND ACETAMINOPHEN 5; 325 MG/1; MG/1
1 TABLET ORAL
Status: COMPLETED | OUTPATIENT
Start: 2023-01-15 | End: 2023-01-15

## 2023-01-15 RX ORDER — HYDROCODONE BITARTRATE AND ACETAMINOPHEN 10; 325 MG/1; MG/1
1 TABLET ORAL
Status: COMPLETED | OUTPATIENT
Start: 2023-01-15 | End: 2023-01-15

## 2023-01-15 RX ORDER — METHYLPREDNISOLONE SOD SUCC 125 MG
125 VIAL (EA) INJECTION ONCE
Status: COMPLETED | OUTPATIENT
Start: 2023-01-15 | End: 2023-01-15

## 2023-01-15 RX ADMIN — HYDROCODONE BITARTRATE AND ACETAMINOPHEN 1 TABLET: 5; 325 TABLET ORAL at 09:01

## 2023-01-15 RX ADMIN — METHYLPREDNISOLONE SODIUM SUCCINATE 125 MG: 125 INJECTION, POWDER, FOR SOLUTION INTRAMUSCULAR; INTRAVENOUS at 03:01

## 2023-01-15 RX ADMIN — HYDROCODONE BITARTRATE AND ACETAMINOPHEN 1 TABLET: 10; 325 TABLET ORAL at 04:01

## 2023-01-15 NOTE — ED TRIAGE NOTES
Pt c/o back pain after falling at 3 pm yesterday. Pt verbalizes going to [facility] after fall.

## 2023-01-15 NOTE — ED PROVIDER NOTES
Encounter Date: 1/14/2023       History     Chief Complaint   Patient presents with    Back Pain     Lower back/coccyx pain, bilateral leg numbness, since fall last week. Prior back surgery.     38 YO WF in ER with complaints of continued lower back pain with numbness to BLE chronically. States her legs feel weak and give out on her causing her to fall ever since she had back surgery with L5/S1 fusion about 10 months ago. Denies bladder or bowel dysfunction. She is still in PT 2 days per week and cannot work due to her chronic pain and leg weakness/numbness. Currently sees Pain Management Dr. Brito and he is doing LESI along with Lyrica and Zanaflex which is not really helping. She also reports a 100 lb weight gain since her back surgery after she had lost a lot of weight from her gastric sleeve surgery. She has had over 20 ER visits for the same complaint over the last 3 months and has filled over 150 tablets of controlled pain medications in the same time frame. Denies fever, chills, chest pain, SOB, abdominal pain, N/V/D, HA or dizziness. No other complaints.     The history is provided by the patient.   Review of patient's allergies indicates:   Allergen Reactions    Nsaids (non-steroidal anti-inflammatory drug) Other (See Comments)     Hx of bariatric sx was told not to take oral nsaids  Not a true allergy, pt reports can not take orally due to surgery     Past Medical History:   Diagnosis Date    Chronic back pain     H/O gastric sleeve      Past Surgical History:   Procedure Laterality Date    abominal plasty      BACK SURGERY      BARIATRIC SURGERY      gastric sleeve      INJECTION OF FACET JOINT Bilateral 08/02/2022    Procedure: bilateral lumbar facet injection L4-5 and L5-S1 (Iv Sedation);  Surgeon: Robert Reynolds Jr., MD;  Location: [facility];  Service: Pain Management;  Laterality: Bilateral;    SPINAL FUSION       History reviewed. No pertinent family history.  Social History     Tobacco Use     Smoking status: Never    Smokeless tobacco: Never   Substance Use Topics    Alcohol use: Never    Drug use: Never     Review of Systems   Constitutional:  Negative for chills and fever.   HENT:  Negative for congestion and sore throat.    Respiratory:  Negative for shortness of breath.    Cardiovascular:  Negative for chest pain.   Gastrointestinal:  Negative for abdominal pain, diarrhea, nausea and vomiting.   Genitourinary:  Negative for dysuria.   Musculoskeletal:  Positive for back pain.   Skin:  Negative for rash.   Neurological:  Positive for weakness and numbness. Negative for dizziness, light-headedness and headaches.   Hematological:  Does not bruise/bleed easily.   All other systems reviewed and are negative.    Physical Exam     Initial Vitals [01/14/23 1551]   BP Pulse Resp Temp SpO2   132/79 62 16 98.2 °F (36.8 °C) 100 %      MAP       --         Physical Exam    Nursing note and vitals reviewed.  Constitutional: She appears well-developed and well-nourished. She is not diaphoretic. No distress.   HENT:   Head: Normocephalic and atraumatic.   Mouth/Throat: Oropharynx is clear and moist.   Eyes: Conjunctivae are normal.   Cardiovascular:  Normal rate, regular rhythm, normal heart sounds and intact distal pulses.           Pulmonary/Chest: Breath sounds normal.   Musculoskeletal:      Lumbar back: Spasms and tenderness present. Decreased range of motion.        Back:      Neurological: She is alert and oriented to person, place, and time. She has normal strength. No cranial nerve deficit or sensory deficit.   Skin: Skin is warm and dry.   Psychiatric: She has a normal mood and affect.       ED Course   Procedures  Labs Reviewed   POCT URINE PREGNANCY          Imaging Results              X-Ray Lumbar Spine Ap And Lateral (Final result)  Result time 01/14/23 16:46:30      Final result by Wayne Nielsen MD (01/14/23 16:46:30)                   Impression:      No acute radiographic findings  identified.      Electronically signed by: Wayne Nielsen  Date:    01/14/2023  Time:    16:46               Narrative:    EXAMINATION:  XR LUMBAR SPINE AP AND LATERAL    CLINICAL HISTORY:  fall;    TECHNIQUE:  Frontal and lateral radiographs of the lumbar spine    COMPARISON:  Radiography 11/20/2022    FINDINGS:  For the purposes of this report, there are 5 lumbar vertebral bodies. Lumbar vertebral body heights are maintained.  Chronic mild anterior wedging of the T11 vertebral body.  Interbody implant at L5-S1.  No listhesis.  Mild degenerative changes at T11-12 and L3-4.                                       Medications   ketorolac injection 60 mg (60 mg Intramuscular Given 1/14/23 1800)   HYDROcodone-acetaminophen  mg per tablet 1 tablet (1 tablet Oral Given 1/14/23 1800)                 ED Course as of 01/14/23 2311   Sat Jan 14, 2023   1730 VSS, NAD, pt is non-toxic or ill appearing, imaging reviewed with pt, counseled pt that I cannot prescribe her any narcotics to treat her chronic pain, will give IM toradol and [place] while in ER and Medrol Dose Pack for at home use, treatment plan and discharge instructions including follow up discussed, pt verbalized understanding, all questions answered, pt is stable and ready for discharge  [TT]      ED Course User Index  [TT] ISAI Wells                 Clinical Impression:   Final diagnoses:  [M54.42, M54.41, G89.29] Chronic bilateral low back pain with bilateral sciatica (Primary)        ED Disposition Condition    Discharge Stable          ED Prescriptions       Medication Sig Dispense Start Date End Date Auth. Provider    methylPREDNISolone (MEDROL DOSEPACK) 4 mg tablet Take as package instructs 1 each 1/14/2023 -- ISAI Wells          Follow-up Information       Follow up With Specialties Details Why Contact Info    Riana Urena MD Neurosurgery Schedule an appointment as soon as possible for a visit in 1 week  [address] [place]  [id number]  [phone]      Primary Care Provider  Schedule an appointment as soon as possible for a visit in 5 days  Call a PCP to make an appointment for follow up within 3-5  days.  You can all the phone number on the back of your insurance card for accepting providers as discussed.    [facility] - Emergency Dept Emergency Medicine In 3 days As needed, If symptoms worsen [address] [facility] [address]  [phone]             ISAI Wells  01/14/23 7863       ISAI Wells  01/14/23 7010

## 2023-01-15 NOTE — Clinical Note
"Carol Nunonadya Edmond was seen and treated in our emergency department on 1/15/2023.  She may return to work on 01/17/2023.       If you have any questions or concerns, please don't hesitate to call.      Andie Montero MD"

## 2023-01-15 NOTE — ED PROVIDER NOTES
Encounter Date: 1/15/2023       History     Chief Complaint   Patient presents with    Fall     37-year-old female with a history of chronic back pain followed by Dr. Brito treated with Zanaflex and Lyrica presents to the emergency room complaining of recurrent falls, low back, mid back, and neck pain.  She states that she was seen at [facility] a few hours ago and given a Toradol shot and a Norco which is not helping her pain.  She had an x-ray of her low back which is read by the radiologist showing no acute findings.  She states she is also hurting between her shoulder blades in her neck and would like to have x-rays of her midback and neck as well.  She has chronic back and leg pain, uses a walker at home, goes to physical therapy twice weekly.      Review of patient's allergies indicates:   Allergen Reactions    Nsaids (non-steroidal anti-inflammatory drug) Other (See Comments)     Hx of bariatric sx was told not to take oral nsaids  Not a true allergy, pt reports can not take orally due to surgery     Past Medical History:   Diagnosis Date    Chronic back pain     H/O gastric sleeve      Past Surgical History:   Procedure Laterality Date    abominal plasty      BACK SURGERY      BARIATRIC SURGERY      gastric sleeve      INJECTION OF FACET JOINT Bilateral 08/02/2022    Procedure: bilateral lumbar facet injection L4-5 and L5-S1 (Iv Sedation);  Surgeon: Robert Reynolds Jr., MD;  Location: [facility];  Service: Pain Management;  Laterality: Bilateral;    SPINAL FUSION       History reviewed. No pertinent family history.  Social History     Tobacco Use    Smoking status: Never    Smokeless tobacco: Never   Substance Use Topics    Alcohol use: Never    Drug use: Never     Review of Systems   Musculoskeletal:  Positive for back pain.   All other systems reviewed and are negative.    Physical Exam     Initial Vitals [01/15/23 0300]   BP Pulse Resp Temp SpO2   (!) 156/89 77 18 97.9 °F (36.6 °C) 97 %      MAP       --          Physical Exam    Constitutional: She appears well-developed and well-nourished.   Eyes: EOM are normal. Pupils are equal, round, and reactive to light.   Abdominal: Abdomen is soft. There is no abdominal tenderness.   Musculoskeletal:      Comments: Neck has full range of motion and mild tenderness in the C6-7 region.  She has mild tenderness also to the midback between the shoulder blades in the paraspinous muscles and along the spine.  She is also tender in the entire low back region.  She has normal motor and sensation to her lower extremities.     Neurological: She is alert and oriented to person, place, and time. GCS score is 15. GCS eye subscore is 4. GCS verbal subscore is 5. GCS motor subscore is 6.   Skin: Skin is warm and dry. Capillary refill takes less than 2 seconds.   Psychiatric: She has a normal mood and affect. Thought content normal.       ED Course   Procedures  Labs Reviewed - No data to display       Imaging Results              X-Ray Thoracic Spine AP Lateral (In process)                      X-Ray Cervical Spine 2 or 3 Views (In process)                   X-Rays:   Independently Interpreted Readings:   Other Readings:  Preliminary reading of the C-spine and T-spine x-ray shows no fracture  Medications   HYDROcodone-acetaminophen  mg per tablet 1 tablet (has no administration in time range)   methylPREDNISolone sodium succinate injection 125 mg (125 mg Intramuscular Given 1/15/23 0338)     Medical Decision Making:   Initial Assessment:   37-year-old female with a history of chronic low back pain status post surgery 10 months ago complains of multiple recent falls, last fell 2 days ago, and has predominantly low back pain.  She had an L-spine x-ray at [facility] in his asking for an x-ray of her T-spine and C-spine.  Differential Diagnosis:   Differential diagnosis includes but is not limited to spinal fracture, radiculopathy, exacerbation of chronic pain  Clinical Tests:   Radiological  Study: Reviewed  ED Management:  Patient was given a dose of Solu-Medrol and I will give her a single dose of Norco 10 while in the emergency room.  I have explained to her that I can not prescribe narcotics and she states she does not understand.  She is been treated by her doctor with Xanax, Zanaflex, and Lyrica for her chronic pain.  She is had 30 prescribers of controlled substances reported on the [place] board of pharmacy and it is not appropriate for me to prescribe narcotics considering she has a pain management doctor and she has multiple medications to treat her chronic pain.  I have also explained to her that there is a risk of giving her too many sedating medications and that although I feel comfortable giving her a single dose of pain medicine the emergency room, it is not appropriate for me to give her prescription for further narcotics.  She will have to follow-up with her pain management doctor next week and her neurosurgeon.  Her C-spine and T-spine are negative on preliminary reading by me.  L-spine x-ray was read at [facility] showing no acute findings.  She is neurologically intact and stable for discharge.                        Clinical Impression:   Final diagnoses:  [W19.XXXA] Fall  [M54.50, G89.29] Acute exacerbation of chronic low back pain (Primary)        ED Disposition Condition    Discharge Stable          ED Prescriptions    None       Follow-up Information       Follow up With Specialties Details Why Contact Info    Riana Urena MD Neurosurgery Schedule an appointment as soon as possible for a visit   [address]  [place] [id number]  [phone]               Andie Montero MD  01/15/23 4150

## 2023-01-15 NOTE — ED TRIAGE NOTES
Pt concerned at recurrent fall as legs gave out this afternoon with complaint of lower back pain with nausea. Pt to Ed via [facility] and sent to [facility] per [facility] for triage completion. Pt relates that she was seen at [facility] lastnight for a fall and Xrays obtained.

## 2023-01-16 NOTE — ED PROVIDER NOTES
Encounter Date: 1/15/2023       History     Chief Complaint   Patient presents with    Fall     Pt concerned at recurrent fall as legs gave out this afternoon with complaint of lower back pain with nausea. Pt to Ed via [facility] and sent to [facility] per [facility] for triage completion. Pt relates that she was seen at [facility] lastnight for a fall and Xrays obtained.     Ms. Edmond is a 37-year-old female with multiple ER visits for chronic back pain stating that she had a fall again today.  She states that she has numbness down the back of her legs and that her legs just gave out and she fell and she has pain between her shoulder blades.  I saw her yesterday for the same complaint and did an x-ray of her C-spine and T-spine which was negative.  She was seen before that at [facility] and had an x-ray of the L-spine which was negative.  She has no urinary retention, no difficulty with bowel movements, and denies any perineal numbness or tingling.  She states that she does therapy twice a week and walks okay for her physical therapy.  She states she just has episodes at home her legs get weak and then she falls.  She lives with her wife in a private residence and is followed by Dr. Brito for her pain management.      Review of patient's allergies indicates:   Allergen Reactions    Nsaids (non-steroidal anti-inflammatory drug) Other (See Comments)     Hx of bariatric sx was told not to take oral nsaids  Not a true allergy, pt reports can not take orally due to surgery     Past Medical History:   Diagnosis Date    Chronic back pain     H/O gastric sleeve      Past Surgical History:   Procedure Laterality Date    abominal plasty      BACK SURGERY      BARIATRIC SURGERY      CHOLECYSTECTOMY      gastric sleeve      INJECTION OF FACET JOINT Bilateral 08/02/2022    Procedure: bilateral lumbar facet injection L4-5 and L5-S1 (Iv Sedation);  Surgeon: Robert Reynolds Jr., MD;  Location: [facility];  Service: Pain Management;  Laterality: Bilateral;     SPINAL FUSION       No family history on file.  Social History     Tobacco Use    Smoking status: Never    Smokeless tobacco: Never   Substance Use Topics    Alcohol use: Never    Drug use: Never     Review of Systems   Musculoskeletal:  Positive for back pain.   All other systems reviewed and are negative.    Physical Exam     Initial Vitals [01/15/23 1729]   BP Pulse Resp Temp SpO2   (!) 118/93 84 18 98.1 °F (36.7 °C) 100 %      MAP       --         Physical Exam    Nursing note and vitals reviewed.  Constitutional: She appears well-developed and well-nourished. She is not diaphoretic. No distress.   HENT:   Head: Normocephalic and atraumatic.   Mouth/Throat: Oropharynx is clear and moist.   Eyes: Conjunctivae are normal. Pupils are equal, round, and reactive to light.   Neck: Neck supple.   Cardiovascular:  Normal rate, regular rhythm, normal heart sounds and intact distal pulses.           Pulmonary/Chest: Breath sounds normal. No respiratory distress. She has no wheezes. She has no rhonchi. She has no rales.   Abdominal: Abdomen is soft. Bowel sounds are normal. She exhibits no distension. There is no abdominal tenderness. There is no guarding.   Musculoskeletal:      Cervical back: Neck supple.      Comments: Normal sensation to light touch to the lower extremities, full range of motion of her lower extremities.  She has tenderness diffusely to her back but no point tenderness.     Neurological: She is alert and oriented to person, place, and time.   Skin: Skin is warm and dry. Capillary refill takes less than 2 seconds. No rash noted.   Psychiatric: She has a normal mood and affect. Thought content normal.       ED Course   Procedures  Labs Reviewed - No data to display       Imaging Results              CT Lumbar Spine Without Contrast (Final result)  Result time 01/15/23 20:52:17      Final result by Michael Hand MD (01/15/23 20:52:17)                   Impression:      1. No acute lumbar spine  abnormality.  Findings appear grossly unchanged.    2. Ligament, spinal cord and/or vascular abnormalities cannot be excluded on the basis of this examination.    If continued concern for cauda equina recommend MR. No significant neural foraminal stenosis is appreciated.  The bladder is fluid filled.  Right ovarian cystic structure is partially imaged.  Further evaluation may be obtained with nonemergent pelvic ultrasound.      Electronically signed by: Michael Hand  Date:    01/15/2023  Time:    20:52               Narrative:    CLINICAL HISTORY:  Trauma.    TECHNIQUE:  CT of the lumbar spine Without contrast. Sagittal and coronal reconstructions were performed on the source images.    Automatic exposure control was utilized to reduce the patient's radiation dose.    DLP = 16 12    COMPARISON:  11/27/2022    FINDINGS:  Normal lordotic curvature.  Postsurgical changes at L5-S1 unchanged in the interval.  Vertebral body height disc spaces unchanged.                                       CT Thoracic Spine Without Contrast (Final result)  Result time 01/15/23 20:49:33      Final result by Michael Hand MD (01/15/23 20:49:33)                   Impression:      1. No acute thoracic spine abnormality identified.    2. Ligament, spinal cord and/or vascular abnormalities cannot be excluded on the basis of this examination.    If continued pain recommend further evaluation with nonemergent MRI.      Electronically signed by: Michael Hand  Date:    01/15/2023  Time:    20:49               Narrative:    CLINICAL HISTORY:  Trauma.    TECHNIQUE:  CT of the thoracic spine Without contrast. Sagittal and coronal reconstructions were performed on the source images.    Automatic exposure control was utilized to reduce the patient's radiation dose.    DLP = 1318    COMPARISON:  CT chest dated 09/11/2022    FINDINGS:  There is no acute fracture, subluxation, or dislocation. Limited detail regarding thoracic discs, but there is  no finding seen to suggest acute disc herniation. There is normal kyphotic curvature of the thoracic spine.    The soft tissues are normal. There is no lymphadenopathy. The visualized lungs are unremarkable.                                       Medications   HYDROcodone-acetaminophen 5-325 mg per tablet 1 tablet (1 tablet Oral Given 1/15/23 2143)     Medical Decision Making:   Initial Assessment:   Ms. Edmond is a 37-year-old female with multiple ER visits for chronic back pain stating that she had a fall again today.  She states that she has numbness down the back of her legs and that her legs just gave out and she fell and she has pain between her shoulder blades.  I saw her yesterday for the same complaint and did an x-ray of her C-spine and T-spine which was negative.  She was seen before that at [facility] and had an x-ray of the L-spine which was negative.  She has no urinary retention, no difficulty with bowel movements, and denies any perineal numbness or tingling.  She states that she does therapy twice a week and walks okay for her physical therapy.  She states she just has episodes at home her legs get weak and then she falls.  She lives with her wife in a private residence and is followed by Dr. Brito for her pain management.    Differential Diagnosis:   Differential diagnosis includes but is not limited to cauda equina syndrome, acute exacerbation of chronic back pain, adverse drug reaction, drug-seeking behavior  Clinical Tests:   Radiological Study: Reviewed  ED Management:  Patient was given a single Norco 5 mg in his already been prescribed steroids.  I did a CT scan of the T-spine and L-spine to rule out cauda equina or fracture which could have been missed on x-ray.  This imaging was negative.  I have discussed this with the patient.  She is once again requesting a Norco prescription.  I have discussed with her the fact that we can not prescribe Norco considering she is on other sedating medications.   She will need to follow-up with Dr. Brito.  I also discussed with her the fact that she is falling over and over and possibly she needs to have her current medications reduced or changed but that needs to be done through Dr. Brito.                        Clinical Impression:   Final diagnoses:  [W19.XXXA] Fall  [R29.6] Recurrent falls while walking (Primary)        ED Disposition Condition    Discharge Stable          ED Prescriptions    None       Follow-up Information       Follow up With Specialties Details Why Contact Info    Riana Urena MD Neurosurgery Schedule an appointment as soon as possible for a visit   [address] [place] [id number]  [phone]               Andie Montero MD  01/16/23 0693

## 2023-02-11 ENCOUNTER — HOSPITAL ENCOUNTER (EMERGENCY)
Facility: HOSPITAL | Age: 38
Discharge: HOME | End: 2023-02-11
Attending: INTERNAL MEDICINE
Payer: COMMERCIAL

## 2023-02-11 VITALS
SYSTOLIC BLOOD PRESSURE: 156 MMHG | DIASTOLIC BLOOD PRESSURE: 95 MMHG | RESPIRATION RATE: 18 BRPM | HEART RATE: 72 BPM | HEIGHT: 64 IN | WEIGHT: 280 LBS | TEMPERATURE: 99 F | BODY MASS INDEX: 47.8 KG/M2 | OXYGEN SATURATION: 100 %

## 2023-02-11 DIAGNOSIS — R29.6 RECURRENT FALLS: ICD-10-CM

## 2023-02-11 DIAGNOSIS — T14.8XXA ABRASION: ICD-10-CM

## 2023-02-11 DIAGNOSIS — M54.42 CHRONIC MIDLINE LOW BACK PAIN WITH LEFT-SIDED SCIATICA: Primary | ICD-10-CM

## 2023-02-11 DIAGNOSIS — G89.29 CHRONIC MIDLINE LOW BACK PAIN WITH LEFT-SIDED SCIATICA: Primary | ICD-10-CM

## 2023-02-11 PROCEDURE — 25000003 PHARM REV CODE 250

## 2023-02-11 PROCEDURE — 99283 EMERGENCY DEPT VISIT LOW MDM: CPT

## 2023-02-11 RX ORDER — HYDROCODONE BITARTRATE AND ACETAMINOPHEN 10; 325 MG/1; MG/1
1 TABLET ORAL
Status: COMPLETED | OUTPATIENT
Start: 2023-02-11 | End: 2023-02-11

## 2023-02-11 RX ADMIN — HYDROCODONE BITARTRATE AND ACETAMINOPHEN 1 TABLET: 10; 325 TABLET ORAL at 01:02

## 2023-02-11 NOTE — ED PROVIDER NOTES
Encounter Date: 2/11/2023       History     Chief Complaint   Patient presents with    Fall     C/o naman leg and back pain s/p fall at home on 2/7/23. States hx of chronic back pain     37 y.o. White female with a history of chronic back pain presents to Emergency Department with a chief complaint of back pain. Symptoms began 5 days ago after a fall and have been constant since onset. Patient seen at [facility] on day of incident and on today with no acute findings on imaging. Symptoms are aggravated with exertion and palpation and there are no alleviating factors. The patient denies LOC, head injury, CP, SOB, weakness, fever, chills, or vomiting. She reports taking Lyrica and Zanaflex prior to arrival with no relief of symptoms. No other reported symptoms at this time. Sees pain management. Neuro surgeon: Dr. Urena      The history is provided by the patient.   Back Pain   This is a chronic problem. The current episode started several days ago. The problem occurs throughout the day. The problem has been unchanged. The pain is associated with a recent injury. The pain is present in the lumbar spine. The pain radiates to the left leg. The pain is at a severity of 10/10. The pain is The same all the time. Stiffness is present All day. Pertinent negatives include no chest pain, no fever, no numbness, no abdominal swelling, no bowel incontinence, no perianal numbness, no bladder incontinence, no dysuria, no paresthesias, no paresis, no tingling and no weakness. She has tried muscle relaxants for the symptoms. The treatment provided no relief. Risk factors include obesity.   Review of patient's allergies indicates:   Allergen Reactions    Nsaids (non-steroidal anti-inflammatory drug) Other (See Comments)     Hx of bariatric sx was told not to take oral nsaids  Not a true allergy, pt reports can not take orally due to surgery  Not a true allergy, pt reports can not take orally due to surgery  Hx of bariatric sx was  told not to take oral nsaids  Not a true allergy, pt reports can not take orally due to surgery     Past Medical History:   Diagnosis Date    Chronic back pain     H/O gastric sleeve      Past Surgical History:   Procedure Laterality Date    abominal plasty      BACK SURGERY      BARIATRIC SURGERY      CHOLECYSTECTOMY      gastric sleeve      INJECTION OF FACET JOINT Bilateral 08/02/2022    Procedure: bilateral lumbar facet injection L4-5 and L5-S1 (Iv Sedation);  Surgeon: Robert Reynolds Jr., MD;  Location: [facility];  Service: Pain Management;  Laterality: Bilateral;    SPINAL FUSION       No family history on file.  Social History     Tobacco Use    Smoking status: Never    Smokeless tobacco: Never   Substance Use Topics    Alcohol use: Never    Drug use: Never     Review of Systems   Constitutional:  Negative for chills, fatigue and fever.   Eyes:  Negative for photophobia and visual disturbance.   Respiratory:  Negative for shortness of breath, wheezing and stridor.    Cardiovascular:  Negative for chest pain and leg swelling.   Gastrointestinal:  Negative for bowel incontinence.   Genitourinary:  Negative for bladder incontinence and dysuria.   Musculoskeletal:  Positive for back pain. Negative for gait problem and joint swelling.   Skin:  Positive for wound.   Neurological:  Negative for tingling, weakness, numbness and paresthesias.   All other systems reviewed and are negative.    Physical Exam     Initial Vitals [02/11/23 1303]   BP Pulse Resp Temp SpO2   (!) 156/95 72 18 98.6 °F (37 °C) 100 %      MAP       --         Physical Exam    Nursing note and vitals reviewed.  Constitutional: Vital signs are normal. She appears well-developed and well-nourished. She is not diaphoretic. She is Obese . She is cooperative.  Non-toxic appearance. No distress.   HENT:   Head: Normocephalic and atraumatic.   Right Ear: External ear normal.   Left Ear: External ear normal.   Nose: Nose normal.   Mouth/Throat:  Oropharynx is clear and moist. No oropharyngeal exudate.   Eyes: Conjunctivae and EOM are normal. Pupils are equal, round, and reactive to light. Right eye exhibits no discharge. Left eye exhibits no discharge.   Neck: Neck supple. No JVD present.   Normal range of motion.  Cardiovascular:  Normal rate, regular rhythm, S1 normal, S2 normal, normal heart sounds, intact distal pulses and normal pulses.           Pulmonary/Chest: Effort normal and breath sounds normal. No accessory muscle usage or stridor. No tachypnea. No respiratory distress. She has no decreased breath sounds. She has no wheezes. She has no rhonchi. She has no rales. She exhibits no tenderness.   Abdominal: Abdomen is soft. Bowel sounds are normal. She exhibits no distension. There is no abdominal tenderness. There is no guarding.   Musculoskeletal:         General: Tenderness present. No edema. Normal range of motion.      Right elbow: Normal.      Left elbow: Normal. No swelling. Normal range of motion. No tenderness.      Cervical back: Normal, normal range of motion and neck supple.      Thoracic back: Normal.      Lumbar back: Tenderness present. No swelling or deformity.      Comments: Tenderness noted to lumbar back that radiates down L leg. Full 5/5 ROM noted. CMS intact. All other adjacent joints otherwise normal. No step offs or deformities noted. Abrasion noted to R elbow. No bleeding noted.        Neurological: She is alert and oriented to person, place, and time. She has normal strength. No sensory deficit. GCS score is 15. GCS eye subscore is 4. GCS verbal subscore is 5. GCS motor subscore is 6.   Skin: Skin is warm and dry. Capillary refill takes less than 2 seconds. Abrasion noted. There is erythema.   Psychiatric: She has a normal mood and affect. Thought content normal.       ED Course   Procedures  Labs Reviewed - No data to display       Imaging Results    None          Medications   HYDROcodone-acetaminophen  mg per tablet  1 tablet (1 tablet Oral Given 2/11/23 1343)     Medical Decision Making:   History:   Old Records Summarized: other records and records from another hospital.       <> Summary of Records: Patient seen on 02/07/23 at [facility] after fall for same complaints. Xray R elbow and Xray spine performed. Norco 10 for pain ordered.    RESULTS: No acute fracture or dislocation of the elbow. No anterior sail sign no posterior fat pad sign. The lumbar spine is reviewed and compared to November and there are no acute changes.     Seen on today at [facility] for same complaints. CT, R elbow xray, Xray spine performed, given Toradol and Decadron, and prescribed Percogesic and Zanaflex.     CT RESULTS: Stable findings of prior L5-S1 interbody fusion with posterior decompression. Mild right neural foraminal narrowing L4-5. No acute fracture.    Xray Spine: Degenerative spurring with postop changes of the lumbar spine, similar to November 26, 2022. No significant interval changes.     Xray Elbow: No acute fracture or dislocation.  Initial Assessment:   Patient awake, alert, has non-labored breathing, and follows commands appropriately. C/o lumbar back pain that radiates down her L leg after fall 5 days ago. Abrasion noted to R elbow. NAD noted.   Differential Diagnosis:   Chronic Back Pain, Recurrent Falls, Abrasion, Injury   ED Management:  Co-morbidities and/or factors adding to the complexity or risk for the patient?: none  Differential diagnoses: Chronic Back Pain, Recurrent Falls, Abrasion, Injury   Decision to obtain previous or outside records?: I reviewed outside records.  Chart Review (nursing home, outside records, CareEverywhere): none  Review of RX medications/new RX prescribed by me?: Prescribed Lyrica and Zanaflex by pain management.   Labs/imaging/other tests obtained/considered (risk/benefits of testing discussed): Discussed negative imaging performed at previous facility with patient. No additional imaging or  testing necessary since patient has had CT, R elbow xray, and xray of spine that are negative for acute findings.   Labs/tests interpretation: none  My independent imaging interpretation: none  Treatment/interventions, IV fluids, IV medications: Norco 10 given in ER  Complex management (IV controlled substances, went to OR, DNR, meds requiring monitoring, transfer, etc)?: none  Workup/treatment affected by social determinants of health?:none   Point of care US done/interpretation: none  Consults/radiologist/EMS/social work/family discussion/alternate history: none  Advanced care planning/end of life discussion: none  Shared decision making: Discussed plan of care and interventions with patient. Agreed to and aware of plan of care. Comfortable being discharged home.   ETOH/smoking/drug cessation discussion: none  Dispo: Patient discharged home. Patient denies new or additional complaints; no further tests indicated at this time. Verbalized understanding of instructions. No emergent or apparent distress noted prior to discharge. To follow up with PCP in 1 week as needed. Strict ER return precautions given.                             Clinical Impression:   Final diagnoses:  [M54.42, G89.29] Chronic midline low back pain with left-sided sciatica (Primary)  [T14.8XXA] Abrasion  [R29.6] Recurrent falls        ED Disposition Condition    Discharge Stable          ED Prescriptions    None       Follow-up Information       Follow up With Specialties Details Why Contact Info    Nic Guzman MD Family Medicine Call in 1 week If symptoms worsen, As needed [address] [address]  [phone]      [facility] - Emergency Dept Emergency Medicine Go to  As needed, If symptoms worsen [address]  [facility] [address] [phone]    Riana Urena MD Neurosurgery Call   [address]  [facility] [address] [phone]               Ladonna Fair NP  02/11/23  [id number]

## 2023-02-12 ENCOUNTER — HOSPITAL ENCOUNTER (EMERGENCY)
Facility: HOSPITAL | Age: 38
Discharge: HOME | End: 2023-02-12
Attending: INTERNAL MEDICINE
Payer: COMMERCIAL

## 2023-02-12 VITALS
BODY MASS INDEX: 47.8 KG/M2 | HEIGHT: 64 IN | DIASTOLIC BLOOD PRESSURE: 75 MMHG | OXYGEN SATURATION: 100 % | TEMPERATURE: 98 F | WEIGHT: 280 LBS | RESPIRATION RATE: 16 BRPM | SYSTOLIC BLOOD PRESSURE: 126 MMHG | HEART RATE: 96 BPM

## 2023-02-12 DIAGNOSIS — G89.29 CHRONIC BILATERAL LOW BACK PAIN, UNSPECIFIED WHETHER SCIATICA PRESENT: ICD-10-CM

## 2023-02-12 DIAGNOSIS — M54.50 CHRONIC BILATERAL LOW BACK PAIN, UNSPECIFIED WHETHER SCIATICA PRESENT: ICD-10-CM

## 2023-02-12 DIAGNOSIS — G89.4 CHRONIC PAIN SYNDROME: Primary | ICD-10-CM

## 2023-02-12 PROCEDURE — 99283 EMERGENCY DEPT VISIT LOW MDM: CPT

## 2023-02-12 PROCEDURE — 25000003 PHARM REV CODE 250: Performed by: INTERNAL MEDICINE

## 2023-02-12 RX ORDER — OXYCODONE AND ACETAMINOPHEN 5; 325 MG/1; MG/1
1 TABLET ORAL ONCE
Status: COMPLETED | OUTPATIENT
Start: 2023-02-12 | End: 2023-02-12

## 2023-02-12 RX ORDER — TRAMADOL HYDROCHLORIDE 50 MG/1
50 TABLET ORAL EVERY 6 HOURS PRN
Qty: 20 TABLET | Refills: 0 | Status: SHIPPED | OUTPATIENT
Start: 2023-02-12 | End: 2023-07-13 | Stop reason: ALTCHOICE

## 2023-02-12 RX ORDER — ONDANSETRON 4 MG/1
4 TABLET, ORALLY DISINTEGRATING ORAL ONCE
Status: COMPLETED | OUTPATIENT
Start: 2023-02-12 | End: 2023-02-12

## 2023-02-12 RX ORDER — IBUPROFEN 600 MG/1
600 TABLET ORAL
Status: DISCONTINUED | OUTPATIENT
Start: 2023-02-12 | End: 2023-02-12 | Stop reason: HOSPADM

## 2023-02-12 RX ADMIN — OXYCODONE HYDROCHLORIDE AND ACETAMINOPHEN 1 TABLET: 5; 325 TABLET ORAL at 02:02

## 2023-02-12 RX ADMIN — ONDANSETRON 4 MG: 4 TABLET, ORALLY DISINTEGRATING ORAL at 02:02

## 2023-02-12 NOTE — ED PROVIDER NOTES
Source of History:  Patient, no limitations    Chief complaint:  Back Pain (Lower back pain which radiates bilateral lower extremity worsening , reports 1 episode of bowel incontinence last numb, w/bilateral leg numbness, seen in ED on yesterday. )      HPI:  Carol Edmond is a 37 y.o. female presenting with Back Pain (Lower back pain which radiates bilateral lower extremity worsening , reports 1 episode of bowel incontinence last numb, w/bilateral leg numbness, seen in ED on yesterday. )         Patient presents with complaint of back pain. This is a result of no known injury. Onset of pain was several years ago and has been gradually worsening since. The pain is located in diffuse lower back, described as stabbing and rated as severe, with radiation. The patient denies perianal numbness. The patient denies other injuries. Care prior to arrival consisted of multiple recent ED visit with transient relief.        Review of Systems   Constitutional symptoms:  Negative except as documented in HPI.   Skin symptoms:  Negative except as documented in HPI.   HEENT symptoms:  Negative except as documented in HPI.   Respiratory symptoms:  Negative except as documented in HPI.   Cardiovascular symptoms:  Negative except as documented in HPI.   Gastrointestinal symptoms:  Negative except as documented in HPI.    Genitourinary symptoms:  Negative except as documented in HPI.   Musculoskeletal symptoms:  Negative except as documented in HPI.   Neurologic symptoms:  Negative except as documented in HPI.   Psychiatric symptoms:  Negative except as documented in HPI.   Allergy/immunologic symptoms:  Negative except as documented in HPI.             Additional review of systems information: All other systems reviewed and otherwise negative.      Review of patient's allergies indicates:  No Known Allergies    PMH:  As per HPI and below:    Past Medical History:   Diagnosis Date    Chronic back pain     H/O gastric sleeve      "    No family history on file.    Past Surgical History:   Procedure Laterality Date    abominal plasty      BACK SURGERY      BARIATRIC SURGERY      CHOLECYSTECTOMY      gastric sleeve      INJECTION OF FACET JOINT Bilateral 08/02/2022    Procedure: bilateral lumbar facet injection L4-5 and L5-S1 (Iv Sedation);  Surgeon: Robert Reynolds Jr., MD;  Location: [facility];  Service: Pain Management;  Laterality: Bilateral;    SPINAL FUSION         Social History     Tobacco Use    Smoking status: Never    Smokeless tobacco: Never   Substance Use Topics    Alcohol use: Never    Drug use: Never       Patient Active Problem List   Diagnosis    Dorsalgia of lumbosacral region    Lumbar radiculopathy    Status post lumbar spinal fusion    Fracture of multiple ribs of right side        Physical Exam:    /75 (BP Location: Left arm, Patient Position: Sitting)   Pulse 96   Temp 98.2 °F (36.8 °C) (Oral)   Resp 20   Ht 5' 4" (1.626 m)   Wt 127 kg (280 lb)   LMP 02/06/2023 (Approximate)   SpO2 100%   BMI 48.06 kg/m²     Nursing note and vital signs reviewed.    General:  Alert, appears uncomfortable, tearful  Skin: Normal for Ethnic Origin, No cyanosis  HEENT: Normocephalic and atraumatic, Vision unchanged, Pupils symmetric, No icterus , Nasal mucosa is pink and moist  Cardiovascular:  Regular rate and rhythm, No edema  Chest Wall: No deformity, equal chest rise  Respiratory:  Lungs are clear to auscultation, respirations are non-labored.    Musculoskeletal:  No deformity, Normal perfusion to all extremities  Gastrointestinal:  Soft, Non distended  Neurological:  Alert and oriented, normal motor observed, normal speech observed.  Ambulates without assistance  Psychiatric:  Cooperative, appropriate mood & affect.        Labs that have been ordered have been independently reviewed and interpreted by myself.     Old Chart Reviewed.      Initial Impression/ Differential Dx:  Muscular pain, herniated disc, spine " fracture, intra-abdominal causes and urinary tract infection, dehydration.       MDM:      Reviewed Nurses Note.    Reviewed Pertinent old records.    Orders Placed This Encounter    oxyCODONE-acetaminophen 5-325 mg per tablet 1 tablet    ondansetron disintegrating tablet 4 mg    ibuprofen tablet 600 mg    traMADoL (ULTRAM) 50 mg tablet                    Labs Reviewed - No data to display       No orders to display        No visits with results within 1 Day(s) from this visit.   Latest known visit with results is:   Admission on 01/14/2023, Discharged on 01/14/2023   Component Date Value Ref Range Status    POC Preg Test, Ur 01/14/2023 Negative  Negative Final     Acceptable 01/14/2023 Yes   Final       Imaging Results    None                                              Diagnostic Impression:    1. Chronic pain syndrome    2. Chronic bilateral low back pain, unspecified whether sciatica present         ED Disposition Condition    Discharge Stable             Follow-up Information       [facility] - Emergency Dept.    Specialty: Emergency Medicine  Why: If symptoms worsen  Contact information:  [address]  [facility] [address]  [phone]                            ED Prescriptions       Medication Sig Dispense Start Date End Date Auth. Provider    traMADoL (ULTRAM) 50 mg tablet Take 1 tablet (50 mg total) by mouth every 6 (six) hours as needed for Pain. 20 tablet 2/12/2023 -- Gordy Bunch,           Follow-up Information       Follow up With Specialties Details Why Contact Info    [facility] - Emergency Dept Emergency Medicine  If symptoms worsen [address]  [facility] [address]  [phone]             Gordy Bunch DO  02/12/23 1412

## 2023-02-12 NOTE — ED TRIAGE NOTES
Lower back pain which radiates bilateral lower extremity worsening , reports 1 episode of bowel incontinence last numb, w/bilateral leg numbness, seen in ED on yesterday.

## 2023-02-15 ENCOUNTER — HOSPITAL ENCOUNTER (EMERGENCY)
Facility: HOSPITAL | Age: 38
Discharge: HOME | End: 2023-02-16
Attending: EMERGENCY MEDICINE
Payer: COMMERCIAL

## 2023-02-15 DIAGNOSIS — M54.42 CHRONIC BILATERAL LOW BACK PAIN WITH BILATERAL SCIATICA: Primary | ICD-10-CM

## 2023-02-15 DIAGNOSIS — M54.41 CHRONIC BILATERAL LOW BACK PAIN WITH BILATERAL SCIATICA: Primary | ICD-10-CM

## 2023-02-15 DIAGNOSIS — G89.29 CHRONIC BILATERAL LOW BACK PAIN WITH BILATERAL SCIATICA: Primary | ICD-10-CM

## 2023-02-15 PROCEDURE — 99284 EMERGENCY DEPT VISIT MOD MDM: CPT

## 2023-02-15 NOTE — Clinical Note
"Carol Nunonadya Edmond was seen and treated in our emergency department on 2/15/2023.  She may return to work on 02/20/2023.       If you have any questions or concerns, please don't hesitate to call.      Andie Montero MD"
Ambulatory

## 2023-02-16 ENCOUNTER — HOSPITAL ENCOUNTER (EMERGENCY)
Facility: HOSPITAL | Age: 38
Discharge: HOME | End: 2023-02-16
Attending: STUDENT IN AN ORGANIZED HEALTH CARE EDUCATION/TRAINING PROGRAM
Payer: COMMERCIAL

## 2023-02-16 VITALS
WEIGHT: 280 LBS | HEIGHT: 64 IN | HEART RATE: 79 BPM | TEMPERATURE: 99 F | OXYGEN SATURATION: 100 % | DIASTOLIC BLOOD PRESSURE: 84 MMHG | BODY MASS INDEX: 47.8 KG/M2 | RESPIRATION RATE: 18 BRPM | SYSTOLIC BLOOD PRESSURE: 143 MMHG

## 2023-02-16 VITALS
WEIGHT: 280 LBS | SYSTOLIC BLOOD PRESSURE: 129 MMHG | OXYGEN SATURATION: 100 % | HEIGHT: 64 IN | HEART RATE: 70 BPM | DIASTOLIC BLOOD PRESSURE: 84 MMHG | TEMPERATURE: 98 F | RESPIRATION RATE: 18 BRPM | BODY MASS INDEX: 47.8 KG/M2

## 2023-02-16 VITALS
HEART RATE: 79 BPM | WEIGHT: 280 LBS | BODY MASS INDEX: 47.8 KG/M2 | HEIGHT: 64 IN | DIASTOLIC BLOOD PRESSURE: 88 MMHG | RESPIRATION RATE: 18 BRPM | TEMPERATURE: 100 F | OXYGEN SATURATION: 100 % | SYSTOLIC BLOOD PRESSURE: 133 MMHG

## 2023-02-16 DIAGNOSIS — R52 PAIN: ICD-10-CM

## 2023-02-16 DIAGNOSIS — T14.8XXA ABRASION: ICD-10-CM

## 2023-02-16 DIAGNOSIS — M25.511 ACUTE PAIN OF RIGHT SHOULDER: ICD-10-CM
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
## 2023-02-16 DIAGNOSIS — N83.209 CYST OF OVARY, UNSPECIFIED LATERALITY: ICD-10-CM

## 2023-02-16 DIAGNOSIS — S39.012D STRAIN OF LUMBAR REGION, SUBSEQUENT ENCOUNTER: ICD-10-CM

## 2023-02-16 DIAGNOSIS — S30.1XXA CONTUSION OF ABDOMINAL WALL, INITIAL ENCOUNTER: ICD-10-CM

## 2023-02-16 LAB — B-HCG SERPL QL: NEGATIVE

## 2023-02-16 PROCEDURE — 63600175 PHARM REV CODE 636 W HCPCS

## 2023-02-16 PROCEDURE — 96372 THER/PROPH/DIAG INJ SC/IM: CPT

## 2023-02-16 PROCEDURE — 81025 URINE PREGNANCY TEST: CPT | Performed by: STUDENT IN AN ORGANIZED HEALTH CARE EDUCATION/TRAINING PROGRAM

## 2023-02-16 PROCEDURE — 96372 THER/PROPH/DIAG INJ SC/IM: CPT | Mod: 59 | Performed by: EMERGENCY MEDICINE

## 2023-02-16 PROCEDURE — 99284 EMERGENCY DEPT VISIT MOD MDM: CPT | Mod: 25

## 2023-02-16 PROCEDURE — 25500020 PHARM REV CODE 255: Performed by: STUDENT IN AN ORGANIZED HEALTH CARE EDUCATION/TRAINING PROGRAM

## 2023-02-16 PROCEDURE — 63600175 PHARM REV CODE 636 W HCPCS: Performed by: EMERGENCY MEDICINE

## 2023-02-16 PROCEDURE — 99285 EMERGENCY DEPT VISIT HI MDM: CPT | Mod: 25,27

## 2023-02-16 RX ORDER — ORPHENADRINE CITRATE 30 MG/ML
60 INJECTION INTRAMUSCULAR; INTRAVENOUS
Status: COMPLETED | OUTPATIENT
Start: 2023-02-16 | End: 2023-02-16

## 2023-02-16 RX ORDER — DEXAMETHASONE SODIUM PHOSPHATE 4 MG/ML
8 INJECTION, SOLUTION INTRA-ARTICULAR; INTRALESIONAL; INTRAMUSCULAR; INTRAVENOUS; SOFT TISSUE
Status: COMPLETED | OUTPATIENT
Start: 2023-02-16 | End: 2023-02-16

## 2023-02-16 RX ORDER — METHOCARBAMOL 750 MG/1
750 TABLET, FILM COATED ORAL 3 TIMES DAILY
Qty: 30 TABLET | Refills: 0 | Status: SHIPPED | OUTPATIENT
Start: 2023-02-16 | End: 2023-02-26

## 2023-02-16 RX ORDER — METHOCARBAMOL 750 MG/1
1500 TABLET, FILM COATED ORAL 3 TIMES DAILY
Qty: 30 TABLET | Refills: 0 | Status: SHIPPED | OUTPATIENT
Start: 2023-02-16 | End: 2023-02-21

## 2023-02-16 RX ORDER — KETOROLAC TROMETHAMINE 30 MG/ML
60 INJECTION, SOLUTION INTRAMUSCULAR; INTRAVENOUS
Status: COMPLETED | OUTPATIENT
Start: 2023-02-16 | End: 2023-02-16

## 2023-02-16 RX ADMIN — DEXAMETHASONE SODIUM PHOSPHATE 8 MG: 4 INJECTION, SOLUTION INTRA-ARTICULAR; INTRALESIONAL; INTRAMUSCULAR; INTRAVENOUS; SOFT TISSUE at 01:02

## 2023-02-16 RX ADMIN — KETOROLAC TROMETHAMINE 60 MG: 30 INJECTION, SOLUTION INTRAMUSCULAR at 01:02

## 2023-02-16 RX ADMIN — IOPAMIDOL 100 ML: 755 INJECTION, SOLUTION INTRAVENOUS at 12:02

## 2023-02-16 RX ADMIN — ORPHENADRINE CITRATE 60 MG: 30 INJECTION INTRAMUSCULAR; INTRAVENOUS at 01:02
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
## 2023-02-16 NOTE — DISCHARGE INSTRUCTIONS
Follow-up with the primary care physician.      Follow-up with pain management.      You may take muscle relaxer as prescribed for pain.    Return to the emergency department if any new or worsening symptoms.      Your CT scan showed incidental ovarian cyst.  Please follow-up with OBGYN.

## 2023-02-16 NOTE — PROGRESS NOTES
Met with patient as part of complex care plan. 4 ED visits this month for pain. Pt reported that she saw Dr Urena last week due to back pain, although unable to find visit note in chart at this time. She reported recently being established with Dr. Brito for pain management and said that she saw him last week and is waiting on a call to schedule injections. She denied further needs or resources at this time.

## 2023-02-16 NOTE — Clinical Note
"Carol Crespo" Mayito was seen and treated in our emergency department on 2/16/2023.  She may return to work on 02/18/2023.       If you have any questions or concerns, please don't hesitate to call.      Cecilia MUNOZ    "

## 2023-02-16 NOTE — DISCHARGE INSTRUCTIONS
Use caution with all of the sedating medications you take as they increase your fall risk.  Follow-up with your primary care provider for further care.  Physical therapy may be helpful to improve your balance and walking.

## 2023-02-16 NOTE — ED PROVIDER NOTES
Encounter Date: 2/15/2023       History     Chief Complaint   Patient presents with    Back Pain      37-year-old female states that she fell and hurt her back about 2 weeks ago and had x-rays and CT scans.  She was just prescribed tramadol 2 days ago and complains of continued back pain.  She has a history of chronic back pain and has a history of a gastric sleeve and states that she cannot take anti-inflammatory pain medications.        Review of patient's allergies indicates:  No Known Allergies  Past Medical History:   Diagnosis Date    Chronic back pain     H/O gastric sleeve      Past Surgical History:   Procedure Laterality Date    abominal plasty      BACK SURGERY      BARIATRIC SURGERY      CHOLECYSTECTOMY      gastric sleeve      INJECTION OF FACET JOINT Bilateral 08/02/2022    Procedure: bilateral lumbar facet injection L4-5 and L5-S1 (Iv Sedation);  Surgeon: Robert Reynolds Jr., MD;  Location: [facility];  Service: Pain Management;  Laterality: Bilateral;    SPINAL FUSION       No family history on file.  Social History     Tobacco Use    Smoking status: Never    Smokeless tobacco: Never   Substance Use Topics    Alcohol use: Never    Drug use: Never     Review of Systems   Musculoskeletal:  Positive for back pain.   All other systems reviewed and are negative.    Physical Exam     Initial Vitals [02/15/23 2355]   BP Pulse Resp Temp SpO2   129/84 70 18 97.5 °F (36.4 °C) 100 %      MAP       --         Physical Exam    Nursing note and vitals reviewed.  Constitutional: She appears well-developed and well-nourished. She is not diaphoretic. No distress.   HENT:   Head: Normocephalic and atraumatic.   Mouth/Throat: Oropharynx is clear and moist.   Eyes: Conjunctivae are normal. Pupils are equal, round, and reactive to light.   Neck: Neck supple.   Cardiovascular:  Normal rate, regular rhythm, normal heart sounds and intact distal pulses.           Pulmonary/Chest: Breath sounds normal. No respiratory  distress. She has no wheezes. She has no rhonchi. She has no rales.   Abdominal: Abdomen is soft. Bowel sounds are normal. She exhibits no distension. There is no abdominal tenderness. There is no guarding.   Musculoskeletal:         General: No tenderness or edema. Normal range of motion.      Cervical back: Neck supple.     Neurological: She is alert and oriented to person, place, and time.   Skin: Skin is warm and dry. Capillary refill takes less than 2 seconds. No rash noted.   Psychiatric: She has a normal mood and affect. Thought content normal.       ED Course   Procedures  Labs Reviewed - No data to display       Imaging Results    None          Medications   ketorolac injection 60 mg (60 mg Intramuscular Given 2/16/23 0128)   dexAMETHasone injection 8 mg (8 mg Intramuscular Given 2/16/23 0128)     Medical Decision Making:   Initial Assessment:   37-year-old female states that she fell and hurt her back about 2 weeks ago and had x-rays and CT scans.  She was just prescribed tramadol 2 days ago and complains of continued back pain.  She has a history of chronic back pain and has a history of a gastric sleeve and states that she cannot take anti-inflammatory pain medications.    Differential Diagnosis:     Differential diagnosis includes but is not limited to chronic pain, generalized weakness, polypharmacy  ED Management:   I gave the patient a shot of Toradol and Decadron.  I have explained to her that she needs to see her PCP and get scheduled for physical therapy.  She was just prescribed tramadol and I do not believe further sedating pain medications are appropriate considering she is having recurrent falls.  Other:   I have discussed this case with another health care provider.                        Clinical Impression:   Final diagnoses:  [M54.42, M54.41, G89.29] Chronic bilateral low back pain with bilateral sciatica (Primary)        ED Disposition Condition    Discharge Stable          ED  Prescriptions    None       Follow-up Information       Follow up With Specialties Details Why Contact Info    Nic Guzman MD Family Medicine Schedule an appointment as soon as possible for a visit   [address] [address]  [phone]               Andie Montero MD  02/16/23 5871
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
## 2023-03-17 ENCOUNTER — PATIENT MESSAGE (OUTPATIENT)
Dept: RESEARCH | Facility: HOSPITAL | Age: 38
End: 2023-03-17
Payer: MEDICARE

## 2023-03-28 ENCOUNTER — DOCUMENTATION ONLY (OUTPATIENT)
Dept: CASE MANAGEMENT | Facility: HOSPITAL | Age: 38
End: 2023-03-28
Payer: MEDICARE

## 2023-03-28 NOTE — PROGRESS NOTES
Chart reviewed for [facility]. No ED visits since 2/16 since plan in place. No upcoming appts. Unable to reach pt at this time to offer further resources.

## 2023-04-13 ENCOUNTER — PATIENT MESSAGE (OUTPATIENT)
Dept: PAIN MEDICINE | Facility: CLINIC | Age: 38
End: 2023-04-13
Payer: MEDICARE

## 2023-04-14 ENCOUNTER — PATIENT MESSAGE (OUTPATIENT)
Dept: PAIN MEDICINE | Facility: CLINIC | Age: 38
End: 2023-04-14
Payer: MEDICARE

## 2023-04-24 ENCOUNTER — DOCUMENTATION ONLY (OUTPATIENT)
Dept: CASE MANAGEMENT | Facility: HOSPITAL | Age: 38
End: 2023-04-24
Payer: MEDICARE

## 2023-04-24 NOTE — PROGRESS NOTES
Chart review 4/24 for [facility]. No ED visits since 2/16 since plan in place. No upcoming appts. Unable to reach pt at this time to offer further resources.

## 2023-06-30 ENCOUNTER — DOCUMENTATION ONLY (OUTPATIENT)
Dept: CASE MANAGEMENT | Facility: HOSPITAL | Age: 38
End: 2023-06-30
Payer: MEDICARE

## 2023-06-30 NOTE — PROGRESS NOTES
Chart review as part of CCP. Pt has not had ED visits since February. Last discussion she was being established with pain management. Left VM for pt to follow up on such and to inquire about any additional needs or resources.

## 2023-07-13 ENCOUNTER — OFFICE VISIT (OUTPATIENT)
Dept: URGENT CARE | Facility: CLINIC | Age: 38
End: 2023-07-13
Payer: COMMERCIAL

## 2023-07-13 ENCOUNTER — HOSPITAL ENCOUNTER (EMERGENCY)
Facility: HOSPITAL | Age: 38
Discharge: LEFT BEFORE DISCHARGE | End: 2023-07-13
Payer: COMMERCIAL

## 2023-07-13 VITALS
WEIGHT: 280 LBS | DIASTOLIC BLOOD PRESSURE: 72 MMHG | RESPIRATION RATE: 16 BRPM | OXYGEN SATURATION: 100 % | HEART RATE: 75 BPM | SYSTOLIC BLOOD PRESSURE: 114 MMHG | BODY MASS INDEX: 47.8 KG/M2 | HEIGHT: 64 IN | TEMPERATURE: 99 F

## 2023-07-13 DIAGNOSIS — U07.1 COVID-19: Primary | ICD-10-CM

## 2023-07-13 DIAGNOSIS — R09.81 NASAL CONGESTION: ICD-10-CM

## 2023-07-13 DIAGNOSIS — R51.9 ACUTE NONINTRACTABLE HEADACHE, UNSPECIFIED HEADACHE TYPE: ICD-10-CM

## 2023-07-13 DIAGNOSIS — M79.10 MYALGIA: ICD-10-CM

## 2023-07-13 DIAGNOSIS — R06.02 SOB (SHORTNESS OF BREATH): ICD-10-CM

## 2023-07-13 LAB
CTP QC/QA: YES
SARS-COV-2 RDRP RESP QL NAA+PROBE: POSITIVE

## 2023-07-13 PROCEDURE — 99900041 HC LEFT WITHOUT BEING SEEN- EMERGENCY

## 2023-07-13 PROCEDURE — 99214 PR OFFICE/OUTPT VISIT, EST, LEVL IV, 30-39 MIN: ICD-10-PCS | Mod: S$PBB,,,

## 2023-07-13 PROCEDURE — 99215 OFFICE O/P EST HI 40 MIN: CPT | Mod: PBBFAC

## 2023-07-13 PROCEDURE — 87635 SARS-COV-2 COVID-19 AMP PRB: CPT | Mod: PBBFAC

## 2023-07-13 PROCEDURE — 99214 OFFICE O/P EST MOD 30 MIN: CPT | Mod: S$PBB,,,

## 2023-07-13 RX ORDER — PHENTERMINE HYDROCHLORIDE 37.5 MG/1
37.5 TABLET ORAL EVERY MORNING
COMMUNITY
Start: 2023-07-02 | End: 2024-02-06 | Stop reason: ALTCHOICE

## 2023-07-13 RX ORDER — PROMETHAZINE HYDROCHLORIDE AND DEXTROMETHORPHAN HYDROBROMIDE 6.25; 15 MG/5ML; MG/5ML
5 SYRUP ORAL EVERY 4 HOURS PRN
Qty: 180 ML | Refills: 0 | Status: SHIPPED | OUTPATIENT
Start: 2023-07-13 | End: 2023-07-23

## 2023-07-13 RX ORDER — TRAMADOL HYDROCHLORIDE 50 MG/1
50 TABLET ORAL
COMMUNITY
Start: 2023-05-02 | End: 2024-02-06 | Stop reason: ALTCHOICE

## 2023-07-13 RX ORDER — FLUTICASONE PROPIONATE 50 MCG
SPRAY, SUSPENSION (ML) NASAL
Qty: 48 G | Refills: 0 | Status: SHIPPED | OUTPATIENT
Start: 2023-07-13 | End: 2024-02-06 | Stop reason: ALTCHOICE

## 2023-07-13 RX ORDER — ASPIRIN 325 MG
50000 TABLET, DELAYED RELEASE (ENTERIC COATED) ORAL
COMMUNITY
Start: 2023-05-05

## 2023-07-13 RX ORDER — FLUTICASONE PROPIONATE 50 MCG
2 SPRAY, SUSPENSION (ML) NASAL DAILY
Qty: 16 G | Refills: 0 | Status: SHIPPED | OUTPATIENT
Start: 2023-07-13 | End: 2023-07-13

## 2023-07-13 RX ORDER — PREGABALIN 200 MG/1
CAPSULE ORAL
COMMUNITY
Start: 2023-05-02 | End: 2024-02-06 | Stop reason: ALTCHOICE

## 2023-07-13 RX ORDER — ALBUTEROL SULFATE 90 UG/1
2 AEROSOL, METERED RESPIRATORY (INHALATION) EVERY 6 HOURS PRN
Qty: 8 G | Refills: 0 | Status: SHIPPED | OUTPATIENT
Start: 2023-07-13

## 2023-07-13 RX ORDER — CHLOPHEDIANOL HCL AND PYRILAMINE MALEATE 12.5; 12.5 MG/5ML; MG/5ML
10 SOLUTION ORAL 3 TIMES DAILY PRN
Qty: 200 ML | Refills: 0 | Status: SHIPPED | OUTPATIENT
Start: 2023-07-13 | End: 2023-07-13

## 2023-07-13 NOTE — PROGRESS NOTES
"Subjective:      Patient ID: Carol Edmond is a 38 y.o. female.    Vitals:  height is 5' 4" (1.626 m) and weight is 127 kg (280 lb). Her oral temperature is 99 °F (37.2 °C). Her blood pressure is 114/72 and her pulse is 75. Her respiration is 16 and oxygen saturation is 100%.     Chief Complaint: Positive Covid Test at Home, Nasal Congestion, Generalized Body Aches, Headache, Fatigue (X 1 day), and Back Pain    Cc as above.    Headache   This is a new problem. The current episode started yesterday. The problem occurs intermittently. The problem has been unchanged. The pain is located in the Frontal region. The quality of the pain is described as aching. The pain is mild. Associated symptoms include back pain, coughing and muscle aches. She has tried nothing for the symptoms.     Respiratory:  Positive for cough.    Musculoskeletal:  Positive for back pain.    Objective:     Physical Exam   Constitutional: She is oriented to person, place, and time. She appears well-developed. She is cooperative.  Non-toxic appearance. She does not appear ill. No distress. obesity  HENT:   Head: Normocephalic and atraumatic.   Ears:   Right Ear: Hearing, external ear and ear canal normal. A middle ear effusion is present.   Left Ear: Hearing, external ear and ear canal normal. A middle ear effusion is present.   Nose: Congestion present. No mucosal edema, rhinorrhea or nasal deformity. No epistaxis. Right sinus exhibits no maxillary sinus tenderness and no frontal sinus tenderness. Left sinus exhibits no maxillary sinus tenderness and no frontal sinus tenderness.   Mouth/Throat: Uvula is midline and mucous membranes are normal. Mucous membranes are moist. No trismus in the jaw. Normal dentition. No uvula swelling. Posterior oropharyngeal erythema present. No oropharyngeal exudate or posterior oropharyngeal edema. Oropharynx is clear.      Comments: Mild erythema  Mild nasal turbates, postnasal drip      Comments: Mild nasal " turbates, postnasal drip  Eyes: Conjunctivae and lids are normal. No scleral icterus.   Neck: Trachea normal and phonation normal. Neck supple. No edema present. No erythema present. No neck rigidity present.   Cardiovascular: Normal rate, regular rhythm, normal heart sounds and normal pulses.   Pulmonary/Chest: Effort normal. No respiratory distress. She has no decreased breath sounds.   Abdominal: Normal appearance.   Musculoskeletal: Normal range of motion.         General: Normal range of motion.   Lymphadenopathy:     She has no cervical adenopathy.   Neurological: no focal deficit. She is alert and oriented to person, place, and time. She exhibits normal muscle tone.   Skin: Skin is warm, dry, intact and not diaphoretic.   Psychiatric: Her speech is normal and behavior is normal. Mood, judgment and thought content normal.   Nursing note and vitals reviewed.    Assessment:     1. COVID-19    2. SOB (shortness of breath)    3. Nasal congestion    4. Myalgia    5. Acute nonintractable headache, unspecified headache type      Results for orders placed or performed in visit on 07/13/23   POCT COVID-19 Rapid Screening   Result Value Ref Range    POC Rapid COVID Positive (A) Negative     Acceptable Yes        Plan:       COVID-19  -     POCT COVID-19 Rapid Screening  -     pyrilamine-chlophedianoL (NINJACOF) 12.5-12.5 mg/5 mL Liqd; Take 10 mLs by mouth 3 (three) times daily as needed (cough).  Dispense: 200 mL; Refill: 0    SOB (shortness of breath)  -     albuterol (VENTOLIN HFA) 90 mcg/actuation inhaler; Inhale 2 puffs into the lungs every 6 (six) hours as needed for Shortness of Breath. Rescue  Dispense: 8 g; Refill: 0    Nasal congestion  -     pyrilamine-chlophedianoL (NINJACOF) 12.5-12.5 mg/5 mL Liqd; Take 10 mLs by mouth 3 (three) times daily as needed (cough).  Dispense: 200 mL; Refill: 0  -     fluticasone propionate (FLONASE) 50 mcg/actuation nasal spray; 2 sprays (100 mcg total) by Each  Nostril route once daily.  Dispense: 16 g; Refill: 0    Myalgia    Acute nonintractable headache, unspecified headache type    Rest. Drink plenty of fluids to stay hydrated. Tylenol or motrin for fever or aches. The CDC permits 5 days of quarantine and to wear a mask for 5 additional days. Flonase for nasal congestion. Ninjacof as needed for cough. No driving when taking Ninacof, may cause drowsiness. ER precautions provided.

## 2023-07-13 NOTE — LETTER
July 13, 2023      [facility]  [address] [address]  Phone: [phone]       Patient: Carol Edmond   YOB: 1985  Date of Visit: 07/13/2023    To Whom It May Concern:    Brant Edmond  was at [facility] on 07/13/2023. The patient may return to work/school on 07/18/2023 with no restrictions. If you have any questions or concerns, or if I can be of further assistance, please do not hesitate to contact me.    Sincerely,      Ida Spangler NP

## 2023-08-01 ENCOUNTER — DOCUMENTATION ONLY (OUTPATIENT)
Dept: CASE MANAGEMENT | Facility: HOSPITAL | Age: 38
End: 2023-08-01
Payer: MEDICARE

## 2023-08-01 NOTE — PROGRESS NOTES
Chart review as part of CCP. 1 ED visit related to COVID in July. Last discussion with patient they were being set up with pain management. Attempted to call patient to follow up on such but number disconnected.

## 2023-09-05 ENCOUNTER — DOCUMENTATION ONLY (OUTPATIENT)
Dept: CASE MANAGEMENT | Facility: HOSPITAL | Age: 38
End: 2023-09-05
Payer: MEDICARE

## 2023-09-22 ENCOUNTER — DOCUMENTATION ONLY (OUTPATIENT)
Dept: CASE MANAGEMENT | Facility: HOSPITAL | Age: 38
End: 2023-09-22
Payer: MEDICARE

## 2023-10-30 ENCOUNTER — DOCUMENTATION ONLY (OUTPATIENT)
Dept: CASE MANAGEMENT | Facility: HOSPITAL | Age: 38
End: 2023-10-30
Payer: MEDICARE

## 2023-11-30 ENCOUNTER — DOCUMENTATION ONLY (OUTPATIENT)
Dept: CASE MANAGEMENT | Facility: HOSPITAL | Age: 38
End: 2023-11-30
Payer: MEDICARE

## 2024-02-06 ENCOUNTER — HOSPITAL ENCOUNTER (EMERGENCY)
Facility: HOSPITAL | Age: 39
Discharge: HOME | End: 2024-02-06
Attending: STUDENT IN AN ORGANIZED HEALTH CARE EDUCATION/TRAINING PROGRAM
Payer: COMMERCIAL

## 2024-02-06 VITALS
DIASTOLIC BLOOD PRESSURE: 87 MMHG | HEIGHT: 64 IN | WEIGHT: 230 LBS | RESPIRATION RATE: 20 BRPM | HEART RATE: 84 BPM | BODY MASS INDEX: 39.27 KG/M2 | OXYGEN SATURATION: 100 % | TEMPERATURE: 98 F | SYSTOLIC BLOOD PRESSURE: 133 MMHG

## 2024-02-06 DIAGNOSIS — M54.31 BILATERAL SCIATICA: ICD-10-CM

## 2024-02-06 DIAGNOSIS — M54.32 BILATERAL SCIATICA: ICD-10-CM

## 2024-02-06 DIAGNOSIS — G89.29 ACUTE EXACERBATION OF CHRONIC LOW BACK PAIN: Primary | ICD-10-CM

## 2024-02-06 DIAGNOSIS — M54.50 ACUTE EXACERBATION OF CHRONIC LOW BACK PAIN: Primary | ICD-10-CM

## 2024-02-06 PROCEDURE — 63600175 PHARM REV CODE 636 W HCPCS: Performed by: STUDENT IN AN ORGANIZED HEALTH CARE EDUCATION/TRAINING PROGRAM

## 2024-02-06 PROCEDURE — 96372 THER/PROPH/DIAG INJ SC/IM: CPT | Performed by: STUDENT IN AN ORGANIZED HEALTH CARE EDUCATION/TRAINING PROGRAM

## 2024-02-06 PROCEDURE — 99284 EMERGENCY DEPT VISIT MOD MDM: CPT | Mod: 25

## 2024-02-06 RX ORDER — DEXAMETHASONE SODIUM PHOSPHATE 4 MG/ML
8 INJECTION, SOLUTION INTRA-ARTICULAR; INTRALESIONAL; INTRAMUSCULAR; INTRAVENOUS; SOFT TISSUE
Status: COMPLETED | OUTPATIENT
Start: 2024-02-06 | End: 2024-02-06

## 2024-02-06 RX ORDER — KETOROLAC TROMETHAMINE 30 MG/ML
60 INJECTION, SOLUTION INTRAMUSCULAR; INTRAVENOUS
Status: COMPLETED | OUTPATIENT
Start: 2024-02-06 | End: 2024-02-06

## 2024-02-06 RX ADMIN — DEXAMETHASONE SODIUM PHOSPHATE 8 MG: 4 INJECTION, SOLUTION INTRA-ARTICULAR; INTRALESIONAL; INTRAMUSCULAR; INTRAVENOUS; SOFT TISSUE at 01:02

## 2024-02-06 RX ADMIN — KETOROLAC TROMETHAMINE 60 MG: 30 INJECTION, SOLUTION INTRAMUSCULAR; INTRAVENOUS at 01:02

## 2024-02-06 NOTE — ED PROVIDER NOTES
"Encounter Date: 2/6/2024       History     Chief Complaint   Patient presents with    Back Pain     " Back pain had surgery 2022 this feels like a flare up.     HPI    38-year-old female with a past medical history of chronic back pain after back surgery in 2022 as well as history of gastric sleeve and obesity presents emergency department for flare of her back pain.  Patient states she is having lower back pain radiating down her legs.  Denies any numbness or weakness.  States that she recently had a MRI done at outside facility showing worsening ever disc disease.  She is currently working with her neurosurgeon and pain management.  Currently takes Norco 10 3 times a day.  States her pain is a 10/10.  Requesting something more for pain.    Review of patient's allergies indicates:  No Known Allergies  Past Medical History:   Diagnosis Date    Chronic back pain     H/O gastric sleeve      Past Surgical History:   Procedure Laterality Date    abominal plasty      BACK SURGERY      BARIATRIC SURGERY      CHOLECYSTECTOMY      gastric sleeve      INJECTION OF FACET JOINT Bilateral 08/02/2022    Procedure: bilateral lumbar facet injection L4-5 and L5-S1 (Iv Sedation);  Surgeon: Robert Reynolds Jr., MD;  Location: [facility];  Service: Pain Management;  Laterality: Bilateral;    SPINAL FUSION       No family history on file.  Social History     Tobacco Use    Smoking status: Never    Smokeless tobacco: Never   Substance Use Topics    Alcohol use: Never    Drug use: Never     Review of Systems   Constitutional:  Negative for fever.   HENT:  Negative for sore throat.    Respiratory:  Negative for cough and shortness of breath.    Cardiovascular:  Negative for chest pain.   Gastrointestinal:  Negative for abdominal pain, constipation, diarrhea, nausea and vomiting.   Genitourinary:  Negative for dysuria.   Musculoskeletal:  Positive for back pain.   Skin:  Negative for rash.   Neurological:  Negative for weakness and " headaches.   Hematological:  Does not bruise/bleed easily.   All other systems reviewed and are negative.      Physical Exam     Initial Vitals [02/06/24 0057]   BP Pulse Resp Temp SpO2   133/87 84 20 97.9 °F (36.6 °C) 100 %      MAP       --         Physical Exam    Nursing note and vitals reviewed.  Constitutional: She appears well-developed and well-nourished. No distress.   Cardiovascular:  Normal rate and regular rhythm.           Pulmonary/Chest: Breath sounds normal. No respiratory distress. She has no wheezes. She has no rhonchi. She has no rales.   Abdominal: Abdomen is soft. There is no abdominal tenderness. There is no rebound and no guarding.   Musculoskeletal:         General: Tenderness (tenderness to the paraspinal musculature of the lumbar spine with no bony point tenderness.  Positive straight leg test bilaterally) present. Normal range of motion.     Neurological: She is alert and oriented to person, place, and time. She has normal strength. No sensory deficit. GCS score is 15. GCS eye subscore is 4. GCS verbal subscore is 5. GCS motor subscore is 6.   Skin: Skin is warm. Capillary refill takes less than 2 seconds.         ED Course   Procedures  Labs Reviewed - No data to display       Imaging Results    None          Medications   dexAMETHasone injection 8 mg (8 mg Intramuscular Given 2/6/24 0116)   ketorolac injection 60 mg (60 mg Intramuscular Given 2/6/24 0116)     Medical Decision Making  differential diagnosis  Chronic back pain, sciatica,  as well as multiple other possible etiologies      Problems Addressed:  Acute exacerbation of chronic low back pain: chronic illness or injury with exacerbation, progression, or side effects of treatment  Bilateral sciatica: acute illness or injury    Amount and/or Complexity of Data Reviewed  External Data Reviewed: labs, radiology and notes.    Risk  Prescription drug management.                                      Clinical Impression:  Final  diagnoses:  [M54.50, G89.29] Acute exacerbation of chronic low back pain (Primary)  [M54.31, M54.32] Bilateral sciatica          ED Disposition Condition    Discharge Stable          ED Prescriptions    None       Follow-up Information       Follow up With Specialties Details Why Contact Info    Nic Guzman MD Family Medicine Schedule an appointment as soon as possible for a visit   [address] [place] [address]  [phone]      [facility] - Emergency Dept Emergency Medicine Go to  If symptoms worsen [address]  [facility] [id number] [phone]             Joshua Cox MD  02/06/24 0151

## 2024-08-14 ENCOUNTER — HOSPITAL ENCOUNTER (EMERGENCY)
Facility: HOSPITAL | Age: 39
Discharge: HOME | End: 2024-08-14
Attending: EMERGENCY MEDICINE
Payer: COMMERCIAL

## 2024-08-14 VITALS
TEMPERATURE: 98 F | BODY MASS INDEX: 33.32 KG/M2 | OXYGEN SATURATION: 99 % | DIASTOLIC BLOOD PRESSURE: 75 MMHG | WEIGHT: 200 LBS | SYSTOLIC BLOOD PRESSURE: 107 MMHG | HEART RATE: 52 BPM | HEIGHT: 65 IN | RESPIRATION RATE: 18 BRPM

## 2024-08-14 DIAGNOSIS — G89.29 ACUTE EXACERBATION OF CHRONIC LOW BACK PAIN: Primary | ICD-10-CM

## 2024-08-14 DIAGNOSIS — M54.50 ACUTE EXACERBATION OF CHRONIC LOW BACK PAIN: Primary | ICD-10-CM

## 2024-08-14 PROCEDURE — 99284 EMERGENCY DEPT VISIT MOD MDM: CPT | Mod: 25

## 2024-08-14 PROCEDURE — 63600175 PHARM REV CODE 636 W HCPCS: Performed by: EMERGENCY MEDICINE

## 2024-08-14 PROCEDURE — 25000003 PHARM REV CODE 250: Performed by: EMERGENCY MEDICINE

## 2024-08-14 PROCEDURE — 96372 THER/PROPH/DIAG INJ SC/IM: CPT | Performed by: EMERGENCY MEDICINE

## 2024-08-14 RX ORDER — KETOROLAC TROMETHAMINE 30 MG/ML
60 INJECTION, SOLUTION INTRAMUSCULAR; INTRAVENOUS
Status: COMPLETED | OUTPATIENT
Start: 2024-08-14 | End: 2024-08-14

## 2024-08-14 RX ORDER — DEXAMETHASONE SODIUM PHOSPHATE 4 MG/ML
8 INJECTION, SOLUTION INTRA-ARTICULAR; INTRALESIONAL; INTRAMUSCULAR; INTRAVENOUS; SOFT TISSUE
Status: COMPLETED | OUTPATIENT
Start: 2024-08-14 | End: 2024-08-14

## 2024-08-14 RX ORDER — BUTALBITAL, ACETAMINOPHEN AND CAFFEINE 50; 325; 40 MG/1; MG/1; MG/1
2 TABLET ORAL
Status: COMPLETED | OUTPATIENT
Start: 2024-08-14 | End: 2024-08-14

## 2024-08-14 RX ORDER — PREDNISONE 20 MG/1
60 TABLET ORAL DAILY
Qty: 15 TABLET | Refills: 0 | Status: SHIPPED | OUTPATIENT
Start: 2024-08-14 | End: 2024-08-19

## 2024-08-14 RX ADMIN — BUTALBITAL, ACETAMINOPHEN, AND CAFFEINE 2 TABLET: 50; 325; 40 TABLET ORAL at 11:08

## 2024-08-14 RX ADMIN — KETOROLAC TROMETHAMINE 60 MG: 30 INJECTION, SOLUTION INTRAMUSCULAR at 11:08

## 2024-08-14 RX ADMIN — DEXAMETHASONE SODIUM PHOSPHATE 8 MG: 4 INJECTION, SOLUTION INTRA-ARTICULAR; INTRALESIONAL; INTRAMUSCULAR; INTRAVENOUS; SOFT TISSUE at 11:08

## 2024-08-14 NOTE — ED PROVIDER NOTES
Encounter Date: 8/14/2024       History     Chief Complaint   Patient presents with    Back Pain     Reports chronic back pain for 7 years. Reports L lumbar back pain worse today. Denies recent injury     The history is provided by the patient.   Back Pain   This is a recurrent problem. The current episode started several days ago. The problem occurs constantly. The problem has been gradually worsening. The pain is associated with no known injury. The pain is present in the lumbar spine. The quality of the pain is described as stabbing and shooting. The pain radiates to the right thigh. Associated symptoms include leg pain. Pertinent negatives include no chest pain, no fever, no bowel incontinence, no perianal numbness, no bladder incontinence, no dysuria, no paresthesias, no paresis, no tingling and no weakness. She has tried analgesics for the symptoms. The treatment provided mild relief. Risk factors include obesity.   Patient with long h/o low back pain.  Followed by pain management in [place].  She underwent ablation procedure early this year which she states gave her about 6 months of relief.  She developed left sided pain over a month ago and underwent LESI which provided good relief.  She is now having right sided pain and was directed to ED by her pain management MD office.  She has an appointment with him on 8/22.  She is taking Lyrica and oxycodone for pain.    Review of patient's allergies indicates:  No Known Allergies  Past Medical History:   Diagnosis Date    Chronic back pain     H/O gastric sleeve      Past Surgical History:   Procedure Laterality Date    abominal plasty      BACK SURGERY      BARIATRIC SURGERY      CHOLECYSTECTOMY      gastric sleeve      INJECTION OF FACET JOINT Bilateral 08/02/2022    Procedure: bilateral lumbar facet injection L4-5 and L5-S1 (Iv Sedation);  Surgeon: Robert Reynolds Jr., MD;  Location: [facility];  Service: Pain Management;  Laterality: Bilateral;     SPINAL FUSION       No family history on file.  Social History     Tobacco Use    Smoking status: Never    Smokeless tobacco: Never   Substance Use Topics    Alcohol use: Never    Drug use: Never     Review of Systems   Constitutional:  Negative for fever.   HENT:  Negative for sore throat.    Respiratory:  Negative for shortness of breath.    Cardiovascular:  Negative for chest pain.   Gastrointestinal:  Negative for bowel incontinence and nausea.   Genitourinary:  Negative for bladder incontinence and dysuria.   Musculoskeletal:  Positive for back pain.   Skin:  Negative for rash.   Neurological:  Negative for tingling, weakness and paresthesias.   Hematological:  Does not bruise/bleed easily.       Physical Exam     Initial Vitals [08/14/24 1049]   BP Pulse Resp Temp SpO2   107/75 (!) 52 18 98.3 °F (36.8 °C) 99 %      MAP       --         Physical Exam    Nursing note and vitals reviewed.  Constitutional: She appears well-developed and well-nourished.   HENT:   Head: Normocephalic and atraumatic.   Right Ear: External ear normal.   Left Ear: External ear normal.   Eyes: Conjunctivae and EOM are normal. Pupils are equal, round, and reactive to light.   Neck: Neck supple.   Normal range of motion.  Cardiovascular:  Regular rhythm, normal heart sounds and intact distal pulses.   Bradycardia present.         Pulmonary/Chest: Breath sounds normal.   Abdominal: Abdomen is soft. Bowel sounds are normal.   Musculoskeletal:         General: Normal range of motion.      Cervical back: Normal range of motion and neck supple.        Back:      Neurological: She is alert and oriented to person, place, and time. GCS score is 15. GCS eye subscore is 4. GCS verbal subscore is 5. GCS motor subscore is 6.   Skin: Skin is warm and dry. Capillary refill takes less than 2 seconds.   Psychiatric: She has a normal mood and affect. Her behavior is normal. Judgment and thought content normal.         ED Course   Procedures  Labs Reviewed  - No data to display       Imaging Results    None          Medications   dexAMETHasone injection 8 mg (has no administration in time range)   ketorolac injection 60 mg (has no administration in time range)     Medical Decision Making  Risk  Prescription drug management.    Differential includes:  sciatica, herniated disc, chronic pain.  Will give IM steroids and ketorolac and D/C on burst dose of steroids.                                  Clinical Impression:  Final diagnoses:  [M54.50, G89.29] Acute exacerbation of chronic low back pain (Primary)          ED Disposition Condition    Discharge Stable          ED Prescriptions       Medication Sig Dispense Start Date End Date Auth. Provider    predniSONE (DELTASONE) 20 MG tablet Take 3 tablets (60 mg total) by mouth once daily. for 5 days 15 tablet 8/14/2024 8/19/2024 Garrick Eldridge MD          Follow-up Information       Follow up With Specialties Details Why Contact Info    Keep your pain management appointment next week                 Garrick Eldridge MD  08/14/24 4562

## 2024-08-14 NOTE — ED NOTES
Pt reports that the toradol & decadron injections have given her a headache.  Will notify MD when her returns to the nurse's station.

## 2024-08-17 ENCOUNTER — HOSPITAL ENCOUNTER (EMERGENCY)
Facility: HOSPITAL | Age: 39
Discharge: HOME | End: 2024-08-17
Attending: INTERNAL MEDICINE
Payer: COMMERCIAL

## 2024-08-17 VITALS
SYSTOLIC BLOOD PRESSURE: 119 MMHG | OXYGEN SATURATION: 99 % | RESPIRATION RATE: 20 BRPM | TEMPERATURE: 98 F | HEIGHT: 64 IN | WEIGHT: 200 LBS | BODY MASS INDEX: 34.15 KG/M2 | HEART RATE: 63 BPM | DIASTOLIC BLOOD PRESSURE: 67 MMHG

## 2024-08-17 DIAGNOSIS — G89.4 CHRONIC PAIN SYNDROME: ICD-10-CM

## 2024-08-17 DIAGNOSIS — G89.29 ACUTE EXACERBATION OF CHRONIC LOW BACK PAIN: Primary | ICD-10-CM

## 2024-08-17 DIAGNOSIS — M54.50 ACUTE EXACERBATION OF CHRONIC LOW BACK PAIN: Primary | ICD-10-CM

## 2024-08-17 PROCEDURE — 63600175 PHARM REV CODE 636 W HCPCS: Performed by: INTERNAL MEDICINE

## 2024-08-17 PROCEDURE — 96372 THER/PROPH/DIAG INJ SC/IM: CPT | Performed by: INTERNAL MEDICINE

## 2024-08-17 PROCEDURE — 99284 EMERGENCY DEPT VISIT MOD MDM: CPT | Mod: 25

## 2024-08-17 RX ORDER — FENTANYL CITRATE 50 UG/ML
100 INJECTION, SOLUTION INTRAMUSCULAR; INTRAVENOUS
Status: COMPLETED | OUTPATIENT
Start: 2024-08-17 | End: 2024-08-17

## 2024-08-17 RX ORDER — DIPHENHYDRAMINE HYDROCHLORIDE 50 MG/ML
25 INJECTION INTRAMUSCULAR; INTRAVENOUS ONCE
Status: COMPLETED | OUTPATIENT
Start: 2024-08-17 | End: 2024-08-17

## 2024-08-17 RX ADMIN — DIPHENHYDRAMINE HYDROCHLORIDE 25 MG: 50 INJECTION INTRAMUSCULAR; INTRAVENOUS at 04:08

## 2024-08-17 RX ADMIN — FENTANYL CITRATE 100 MCG: 50 INJECTION, SOLUTION INTRAMUSCULAR; INTRAVENOUS at 04:08

## 2024-08-17 NOTE — ED PROVIDER NOTES
"     Source of History:  Patient, no limitations    Chief complaint:  Back Pain (" Was here Wednesday fro back and right leg pain.Not getting better with the meds." I have appointment with pain mangement doctor this week.")      HPI:  Carol Edmond is a 39 y.o. female presenting with Back Pain (" Was here Wednesday fro back and right leg pain.Not getting better with the meds." I have appointment with pain mangement doctor this week.")       Acute on chronic low back pain, no recent injury, no new neurologic symptoms, under pain management contract and currently on Percocet, Neurosurgeon is Dr Urena    Patient presents with complaint of back pain. This is a result of chronic condition. The pain is located in right low back, described as sharp and rated as severe, with radiation. The patient new neurological deficits       Review of Systems   Constitutional symptoms:  Negative except as documented in HPI.   Skin symptoms:  Negative except as documented in HPI.   HEENT symptoms:  Negative except as documented in HPI.   Respiratory symptoms:  Negative except as documented in HPI.   Cardiovascular symptoms:  Negative except as documented in HPI.   Gastrointestinal symptoms:  Negative except as documented in HPI.    Genitourinary symptoms:  Negative except as documented in HPI.   Musculoskeletal symptoms:  Negative except as documented in HPI.   Neurologic symptoms:  no saddle anesthesia, no changes in bowel or bladder  Psychiatric symptoms:  Negative except as documented in HPI.   Allergy/immunologic symptoms:  Negative except as documented in HPI.             Additional review of systems information: All other systems reviewed and otherwise negative.      Review of patient's allergies indicates:  No Known Allergies    PMH:  As per HPI and below:    Past Medical History:   Diagnosis Date    Chronic back pain     H/O gastric sleeve        History reviewed. No pertinent family history.    Past Surgical History: " "  Procedure Laterality Date    abominal plasty      BACK SURGERY      BARIATRIC SURGERY      CHOLECYSTECTOMY      gastric sleeve      INJECTION OF FACET JOINT Bilateral 08/02/2022    Procedure: bilateral lumbar facet injection L4-5 and L5-S1 (Iv Sedation);  Surgeon: Robert Reynolds Jr., MD;  Location: [facility];  Service: Pain Management;  Laterality: Bilateral;    SPINAL FUSION         Social History     Tobacco Use    Smoking status: Never    Smokeless tobacco: Never   Substance Use Topics    Alcohol use: Never    Drug use: Never       Patient Active Problem List   Diagnosis    Dorsalgia of lumbosacral region    Lumbar radiculopathy    Status post lumbar spinal fusion    Fracture of multiple ribs of right side        Physical Exam:    /61 (BP Location: Right arm)   Pulse 68   Temp 98.2 °F (36.8 °C) (Oral)   Resp 20   Ht 5' 4" (1.626 m)   Wt 90.7 kg (200 lb)   LMP 08/15/2024   SpO2 100%   BMI 34.33 kg/m²     Nursing note and vital signs reviewed.    General:  Alert, no acute distress.   Skin: Normal for Ethnic Origin, No cyanosis  HEENT: Normocephalic and atraumatic, Vision unchanged, Pupils symmetric, No icterus , Nasal mucosa is pink and moist  Cardiovascular:  Regular rate and rhythm, No edema  Chest Wall: No deformity, equal chest rise  Respiratory:  Lungs are clear to auscultation, respirations are non-labored.    Musculoskeletal:  No deformity, Normal perfusion to all extremities  Gastrointestinal:  Soft, Non distended  Neurological:  Alert and oriented, normal motor observed, normal speech observed.  Muscle tone nml, ambulates unassisted at brisk pace with narrow gait, no red flags   Psychiatric:  Cooperative, appropriate mood & affect.        Labs that have been ordered have been independently reviewed and interpreted by myself.     Old Chart Reviewed.      Initial Impression/ Differential Dx:  Muscular pain, herniated disc, spine fracture, intra-abdominal causes and urinary tract " infection, dehydration.       MDM:      Reviewed Nurses Note.    Reviewed Pertinent old records.    Orders Placed This Encounter    fentaNYL injection 100 mcg    diphenhydrAMINE injection 25 mg                    Labs Reviewed - No data to display       No orders to display        No visits with results within 1 Day(s) from this visit.   Latest known visit with results is:   Office Visit on 07/13/2023   Component Date Value Ref Range Status    POC Rapid COVID 07/13/2023 Positive (A)  Negative Final     Acceptable 07/13/2023 Yes   Final       Imaging Results    None                                              Diagnostic Impression:    1. Acute exacerbation of chronic low back pain    2. Chronic pain syndrome         ED Disposition Condition    Discharge Stable             Follow-up Information       [facility] - Emergency Dept.    Specialty: Emergency Medicine  Why: If symptoms worsen  Contact information:  [address]  [facility] [phone]                            ED Prescriptions    None       Follow-up Information       Follow up With Specialties Details Why Contact Info    [facility] - Emergency Dept Emergency Medicine  If symptoms worsen [address]  [facility] [phone]             Gordy Bunch, DO  08/17/24 0425

## 2024-09-01 ENCOUNTER — HOSPITAL ENCOUNTER (EMERGENCY)
Facility: HOSPITAL | Age: 39
Discharge: HOME | End: 2024-09-01
Attending: EMERGENCY MEDICINE
Payer: COMMERCIAL

## 2024-09-01 VITALS
HEIGHT: 64 IN | OXYGEN SATURATION: 96 % | SYSTOLIC BLOOD PRESSURE: 113 MMHG | DIASTOLIC BLOOD PRESSURE: 71 MMHG | RESPIRATION RATE: 20 BRPM | BODY MASS INDEX: 33.29 KG/M2 | HEART RATE: 67 BPM | TEMPERATURE: 98 F | WEIGHT: 195 LBS

## 2024-09-01 DIAGNOSIS — K08.89 PAIN, DENTAL: Primary | ICD-10-CM

## 2024-09-01 PROCEDURE — 99281 EMR DPT VST MAYX REQ PHY/QHP: CPT

## 2024-09-01 NOTE — ED PROVIDER NOTES
"Encounter Date: 9/1/2024       History     Chief Complaint   Patient presents with    Dental Pain     Reports "filling" of a top right molar "came out". Pt reports pain to area. Took OTC pain relievers at 11am today. States she has a appointment with her dentist at 0900 Tuesday 9/3     See MDM for details     The history is provided by the patient.     Review of patient's allergies indicates:  No Known Allergies  Past Medical History:   Diagnosis Date    Chronic back pain     H/O gastric sleeve      Past Surgical History:   Procedure Laterality Date    abominal plasty      BACK SURGERY      BARIATRIC SURGERY      CHOLECYSTECTOMY      gastric sleeve      INJECTION OF FACET JOINT Bilateral 08/02/2022    Procedure: bilateral lumbar facet injection L4-5 and L5-S1 (Iv Sedation);  Surgeon: Robert Reynolds Jr., MD;  Location: [facility];  Service: Pain Management;  Laterality: Bilateral;    SPINAL FUSION       No family history on file.  Social History     Tobacco Use    Smoking status: Never    Smokeless tobacco: Never   Substance Use Topics    Alcohol use: Never    Drug use: Never     Review of Systems   Constitutional:  Negative for chills and fever.   HENT:  Positive for dental problem. Negative for facial swelling and trouble swallowing.    Respiratory:  Negative for shortness of breath.    Cardiovascular:  Negative for chest pain.   All other systems reviewed and are negative.      Physical Exam     Initial Vitals [09/01/24 1414]   BP Pulse Resp Temp SpO2   113/71 67 20 98 °F (36.7 °C) 96 %      MAP       --         Physical Exam    Nursing note and vitals reviewed.  Constitutional: She appears well-developed and well-nourished. No distress.   HENT:   Head: Normocephalic and atraumatic.   Nose: Nose normal.   Mouth/Throat: Uvula is midline, oropharynx is clear and moist and mucous membranes are normal. Dental caries present. No dental abscesses. No posterior oropharyngeal edema or posterior oropharyngeal " erythema.       Eyes: Conjunctivae and EOM are normal.   Neck:   Normal range of motion.  Cardiovascular:  Normal rate.           Pulmonary/Chest: No respiratory distress.   Musculoskeletal:         General: Normal range of motion.      Cervical back: Normal range of motion.     Neurological: She is alert and oriented to person, place, and time. GCS score is 15. GCS eye subscore is 4. GCS verbal subscore is 5. GCS motor subscore is 6.   Skin: Skin is warm and dry.   Psychiatric: She has a normal mood and affect. Thought content normal.         ED Course   Procedures  Labs Reviewed - No data to display       Imaging Results    None          Medications - No data to display  Medical Decision Making  39-year-old female presents to the ER for evaluation right upper tooth pain x3 days.  Patient reports that she had a filling fall out on Thursday evening.  She reports that since then she has had continued pain to the area.  She denies any fever or chills.  She denies any facial swelling.  She denies any drainage.  She reports that she was taking numerous over-the-counter medications with no relief.  She also reports that she did go to an urgent care and was given a Toradol injection and antibiotics a few days ago.  Reports that she does have a dentist follow up on Tuesday.    On physical exam, the patient does have a missing filling or large dental aleah to the right upper molar.  Patient reports that she has only taken over-the-counter medications sick, however, per chart review with the patient was seen at [facility] prior to arrival and was given Toradol injection and a dose of Norco.  Of note, the patient is also on chronic pain management has Percocet 10 at.  I discussed with the patient I am aware of her recent medications and her at-home medications and we will not be giving her any additional medications in the ER today.  Recommended the patient use over-the-counter DenTemp to help with her feeling pain until  she was able to see a dentist.      Additional MDM:   Differential Diagnosis:   Other: The following diagnoses were also considered and will be evaluated: dental abscess, malingering and medication seeking.            ED Course as of 09/01/24 1620   Sun Sep 01, 2024   1527 In triage, patient reports that she took OTC medications at 11am with no relief. Per chart review, patient was given Norco and Toradol injection 1 hr PTA at [facility] ER.  [LM]      ED Course User Index  [LM] Brenna Saleem PA                           Clinical Impression:  Final diagnoses:  [K08.89] Pain, dental (Primary)          ED Disposition Condition    Discharge Stable          ED Prescriptions    None       Follow-up Information       Follow up With Specialties Details Why Contact Info    Dentist  Go to  as previously scheduled              Brenna Saleem PA  09/01/24 1620

## 2024-09-01 NOTE — DISCHARGE INSTRUCTIONS
Take previously prescribed penicillin for infection.     OK to take previously prescribed Oxycodone-Acetaminophen for pain by pain management.    Recommend over the counter DenTemp from pharmacy to help with filling.     Recommend soft foods over the next few days until you are able to see your dentist. Follow up with dentist as soon as possible.

## 2024-09-29 ENCOUNTER — HOSPITAL ENCOUNTER (EMERGENCY)
Facility: HOSPITAL | Age: 39
Discharge: HOME | End: 2024-09-29
Attending: INTERNAL MEDICINE
Payer: COMMERCIAL

## 2024-09-29 VITALS
RESPIRATION RATE: 16 BRPM | SYSTOLIC BLOOD PRESSURE: 105 MMHG | OXYGEN SATURATION: 98 % | BODY MASS INDEX: 34.33 KG/M2 | WEIGHT: 200 LBS | HEART RATE: 66 BPM | TEMPERATURE: 98 F | DIASTOLIC BLOOD PRESSURE: 74 MMHG
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
## 2024-09-29 DIAGNOSIS — S20.212A RIB CONTUSION, LEFT, INITIAL ENCOUNTER: ICD-10-CM

## 2024-09-29 LAB — B-HCG UR QL: NEGATIVE

## 2024-09-29 PROCEDURE — 81025 URINE PREGNANCY TEST: CPT | Performed by: INTERNAL MEDICINE

## 2024-09-29 PROCEDURE — 99284 EMERGENCY DEPT VISIT MOD MDM: CPT | Mod: 25

## 2024-09-29 PROCEDURE — 96372 THER/PROPH/DIAG INJ SC/IM: CPT | Performed by: INTERNAL MEDICINE

## 2024-09-29 PROCEDURE — 63600175 PHARM REV CODE 636 W HCPCS: Performed by: INTERNAL MEDICINE

## 2024-09-29 RX ORDER — KETOROLAC TROMETHAMINE 30 MG/ML
60 INJECTION, SOLUTION INTRAMUSCULAR; INTRAVENOUS
Status: COMPLETED | OUTPATIENT
Start: 2024-09-29 | End: 2024-09-29

## 2024-09-29 RX ADMIN — KETOROLAC TROMETHAMINE 60 MG: 30 INJECTION, SOLUTION INTRAMUSCULAR at 02:09
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
## 2024-11-05 ENCOUNTER — HOSPITAL ENCOUNTER (EMERGENCY)
Facility: HOSPITAL | Age: 39
Discharge: HOME | End: 2024-11-05
Attending: STUDENT IN AN ORGANIZED HEALTH CARE EDUCATION/TRAINING PROGRAM
Payer: COMMERCIAL

## 2024-11-05 VITALS
RESPIRATION RATE: 20 BRPM | WEIGHT: 208 LBS | DIASTOLIC BLOOD PRESSURE: 60 MMHG | OXYGEN SATURATION: 100 % | BODY MASS INDEX: 34.66 KG/M2 | TEMPERATURE: 98 F | HEART RATE: 67 BPM | SYSTOLIC BLOOD PRESSURE: 102 MMHG | HEIGHT: 65 IN

## 2024-11-05 DIAGNOSIS — M54.42 CHRONIC MIDLINE LOW BACK PAIN WITH LEFT-SIDED SCIATICA: Primary | ICD-10-CM

## 2024-11-05 DIAGNOSIS — G89.29 CHRONIC MIDLINE LOW BACK PAIN WITH LEFT-SIDED SCIATICA: Primary | ICD-10-CM

## 2024-11-05 PROCEDURE — 99283 EMERGENCY DEPT VISIT LOW MDM: CPT

## 2024-11-05 PROCEDURE — 25000003 PHARM REV CODE 250: Performed by: NURSE PRACTITIONER

## 2024-11-05 RX ORDER — METHOCARBAMOL 500 MG/1
1000 TABLET, FILM COATED ORAL 2 TIMES DAILY PRN
Qty: 20 TABLET | Refills: 0 | Status: SHIPPED | OUTPATIENT
Start: 2024-11-05 | End: 2024-11-10

## 2024-11-05 RX ORDER — METHOCARBAMOL 500 MG/1
1000 TABLET, FILM COATED ORAL
Status: COMPLETED | OUTPATIENT
Start: 2024-11-05 | End: 2024-11-05

## 2024-11-05 RX ADMIN — METHOCARBAMOL 1000 MG: 500 TABLET ORAL at 04:11
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
## 2024-11-05 NOTE — ED NOTES
Pt states she was told to come to the ED if pain is uncontrolled prior to her visit with pain mgmt tomorrow, pt did not disclose she had just been seen less than 30 ins priort o arrival to ED at this time & received toradol & celestone IM.  NP advised pt we could give a muscle relaxer but it was not appropriate to dispense a narcotic  pain med.

## 2024-11-05 NOTE — Clinical Note
"Carol Crespo" Mayito was seen and treated in our emergency department on 11/5/2024.  She may return to work on 11/06/2024.       If you have any questions or concerns, please don't hesitate to call.       RN    "

## 2024-11-05 NOTE — DISCHARGE INSTRUCTIONS
Continue to take your medication you have at home for pain and keep appointment tomorrow with pain management.

## 2024-12-13 ENCOUNTER — HOSPITAL ENCOUNTER (EMERGENCY)
Facility: HOSPITAL | Age: 39
Discharge: HOME | End: 2024-12-13
Attending: EMERGENCY MEDICINE
Payer: COMMERCIAL

## 2024-12-13 ENCOUNTER — HOSPITAL ENCOUNTER (EMERGENCY)
Facility: HOSPITAL | Age: 39
Discharge: HOME | End: 2024-12-13
Attending: FAMILY MEDICINE
Payer: COMMERCIAL

## 2024-12-13 VITALS
WEIGHT: 208 LBS | SYSTOLIC BLOOD PRESSURE: 120 MMHG | RESPIRATION RATE: 16 BRPM | DIASTOLIC BLOOD PRESSURE: 74 MMHG | HEIGHT: 64 IN | OXYGEN SATURATION: 100 % | HEART RATE: 76 BPM | TEMPERATURE: 98 F | BODY MASS INDEX: 35.51 KG/M2

## 2024-12-13 VITALS
DIASTOLIC BLOOD PRESSURE: 91 MMHG | OXYGEN SATURATION: 98 % | BODY MASS INDEX: 37.56 KG/M2 | HEIGHT: 64 IN | SYSTOLIC BLOOD PRESSURE: 140 MMHG | WEIGHT: 220 LBS | HEART RATE: 103 BPM | RESPIRATION RATE: 18 BRPM | TEMPERATURE: 98 F

## 2024-12-13 DIAGNOSIS — M54.50 CHRONIC BILATERAL LOW BACK PAIN WITHOUT SCIATICA: Primary | ICD-10-CM

## 2024-12-13 DIAGNOSIS — G89.29 CHRONIC BILATERAL LOW BACK PAIN WITHOUT SCIATICA: Primary | ICD-10-CM

## 2024-12-13 DIAGNOSIS — S16.1XXA STRAIN OF NECK MUSCLE, INITIAL ENCOUNTER: ICD-10-CM
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
## 2024-12-13 DIAGNOSIS — M54.50 ACUTE EXACERBATION OF CHRONIC LOW BACK PAIN: ICD-10-CM

## 2024-12-13 DIAGNOSIS — G89.29 ACUTE EXACERBATION OF CHRONIC LOW BACK PAIN: ICD-10-CM

## 2024-12-13 PROCEDURE — 99284 EMERGENCY DEPT VISIT MOD MDM: CPT | Mod: 25,27

## 2024-12-13 PROCEDURE — 99284 EMERGENCY DEPT VISIT MOD MDM: CPT | Mod: 25

## 2024-12-13 PROCEDURE — 96372 THER/PROPH/DIAG INJ SC/IM: CPT | Performed by: FAMILY MEDICINE

## 2024-12-13 PROCEDURE — 25000003 PHARM REV CODE 250: Performed by: EMERGENCY MEDICINE

## 2024-12-13 PROCEDURE — 63600175 PHARM REV CODE 636 W HCPCS: Performed by: EMERGENCY MEDICINE

## 2024-12-13 PROCEDURE — 96372 THER/PROPH/DIAG INJ SC/IM: CPT | Performed by: EMERGENCY MEDICINE

## 2024-12-13 PROCEDURE — 63600175 PHARM REV CODE 636 W HCPCS: Performed by: FAMILY MEDICINE

## 2024-12-13 RX ORDER — PROMETHAZINE HYDROCHLORIDE 25 MG/1
25 TABLET ORAL
Status: COMPLETED | OUTPATIENT
Start: 2024-12-13 | End: 2024-12-13

## 2024-12-13 RX ORDER — KETOROLAC TROMETHAMINE 30 MG/ML
60 INJECTION, SOLUTION INTRAMUSCULAR; INTRAVENOUS
Status: COMPLETED | OUTPATIENT
Start: 2024-12-13 | End: 2024-12-13

## 2024-12-13 RX ORDER — MORPHINE SULFATE 4 MG/ML
4 INJECTION, SOLUTION INTRAMUSCULAR; INTRAVENOUS
Status: COMPLETED | OUTPATIENT
Start: 2024-12-13 | End: 2024-12-13

## 2024-12-13 RX ADMIN — KETOROLAC TROMETHAMINE 60 MG: 30 INJECTION, SOLUTION INTRAMUSCULAR at 06:12

## 2024-12-13 RX ADMIN — MORPHINE SULFATE 4 MG: 4 INJECTION INTRAVENOUS at 01:12

## 2024-12-13 RX ADMIN — PROMETHAZINE HYDROCHLORIDE 25 MG: 25 TABLET ORAL at 01:12
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
## 2024-12-13 NOTE — Clinical Note
"Carol Gonzalezica" Mayito was seen and treated in our emergency department on 12/13/2024.  She may return to work on 12/18/2024.       If you have any questions or concerns, please don't hesitate to call.      Andie Montero MD"
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
## 2024-12-27 ENCOUNTER — HOSPITAL ENCOUNTER (EMERGENCY)
Facility: HOSPITAL | Age: 39
Discharge: HOME | End: 2024-12-27
Attending: EMERGENCY MEDICINE
Payer: COMMERCIAL

## 2024-12-27 VITALS
TEMPERATURE: 98 F | RESPIRATION RATE: 20 BRPM | HEART RATE: 79 BPM | DIASTOLIC BLOOD PRESSURE: 90 MMHG | WEIGHT: 195 LBS | SYSTOLIC BLOOD PRESSURE: 126 MMHG | HEIGHT: 65 IN | BODY MASS INDEX: 32.49 KG/M2 | OXYGEN SATURATION: 100 %

## 2024-12-27 DIAGNOSIS — M54.50 CHRONIC LEFT-SIDED LOW BACK PAIN WITHOUT SCIATICA: Primary | ICD-10-CM

## 2024-12-27 DIAGNOSIS — G89.29 CHRONIC LEFT-SIDED LOW BACK PAIN WITHOUT SCIATICA: Primary | ICD-10-CM

## 2024-12-27 PROCEDURE — 99284 EMERGENCY DEPT VISIT MOD MDM: CPT | Mod: 25

## 2024-12-27 PROCEDURE — 63600175 PHARM REV CODE 636 W HCPCS: Performed by: EMERGENCY MEDICINE

## 2024-12-27 PROCEDURE — 25000003 PHARM REV CODE 250: Performed by: EMERGENCY MEDICINE

## 2024-12-27 PROCEDURE — 96372 THER/PROPH/DIAG INJ SC/IM: CPT | Performed by: EMERGENCY MEDICINE

## 2024-12-27 RX ORDER — KETOROLAC TROMETHAMINE 30 MG/ML
60 INJECTION, SOLUTION INTRAMUSCULAR; INTRAVENOUS
Status: COMPLETED | OUTPATIENT
Start: 2024-12-27 | End: 2024-12-27

## 2024-12-27 RX ORDER — OXYCODONE AND ACETAMINOPHEN 5; 325 MG/1; MG/1
2 TABLET ORAL
Status: COMPLETED | OUTPATIENT
Start: 2024-12-27 | End: 2024-12-27

## 2024-12-27 RX ADMIN — KETOROLAC TROMETHAMINE 60 MG: 30 INJECTION, SOLUTION INTRAMUSCULAR at 06:12

## 2024-12-27 RX ADMIN — OXYCODONE HYDROCHLORIDE AND ACETAMINOPHEN 2 TABLET: 5; 325 TABLET ORAL at 06:12

## 2024-12-28 NOTE — ED PROVIDER NOTES
"Encounter Date: 12/27/2024       History     Chief Complaint   Patient presents with    Back Pain     Pt to er c/o chronic low back pain and left side pain onset in September.     The history is provided by the patient.   Back Pain   This is a chronic problem. The problem occurs constantly. The problem has been unchanged. The pain is associated with no known injury. The pain is present in the lumbar spine. The quality of the pain is described as stabbing. Pertinent negatives include no chest pain, no fever, no numbness, no abdominal pain, no dysuria, no leg pain, no paresthesias, no paresis, no tingling and no weakness. She has tried analgesics and NSAIDs for the symptoms. The treatment provided no relief. Risk factors include obesity.   Patient is well-known to this ED.  She is on a complex care plan and this marks her 17th ED visit in the past 4 months for the same complaint.  She claims that the last time she was here (12/13), she was given a "morphine shot" which she claims was of great benefit however, review of the EMR reveals that she went to the ED in [place] later that day for the same complaint.  She is taking Percocet 10 at home without relief.  She claims to have had a recent MRI and is awaiting an appointment with Dr. Urena.  Despite past claims of her inability to take NSAID's due to prior bariatric surgery, today, she tells me she took ibuprofen 800 mg about 8 hours ago and her last Percocet was about 6 hours ago.    Review of patient's allergies indicates:  No Known Allergies  Past Medical History:   Diagnosis Date    Chronic back pain     H/O gastric sleeve      Past Surgical History:   Procedure Laterality Date    abominal plasty      BACK SURGERY      BARIATRIC SURGERY      CHOLECYSTECTOMY      gastric sleeve      INJECTION OF FACET JOINT Bilateral 08/02/2022    Procedure: bilateral lumbar facet injection L4-5 and L5-S1 (Iv Sedation);  Surgeon: Robert Reynolds Jr., MD;  Location: [facility];  " Service: Pain Management;  Laterality: Bilateral;    SPINAL FUSION       No family history on file.  Social History     Tobacco Use    Smoking status: Never    Smokeless tobacco: Never   Substance Use Topics    Alcohol use: Never    Drug use: Never     Review of Systems   Constitutional:  Negative for fever.   HENT:  Negative for sore throat.    Respiratory:  Negative for shortness of breath.    Cardiovascular:  Negative for chest pain.   Gastrointestinal:  Negative for abdominal pain and nausea.   Genitourinary:  Negative for dysuria.   Musculoskeletal:  Positive for back pain.   Skin:  Negative for rash.   Neurological:  Negative for tingling, weakness, numbness and paresthesias.   Hematological:  Does not bruise/bleed easily.       Physical Exam     Initial Vitals [12/27/24 1826]   BP Pulse Resp Temp SpO2   (!) 126/90 79 20 97.7 °F (36.5 °C) 100 %      MAP       --         Physical Exam    Nursing note and vitals reviewed.  Constitutional: She appears well-developed and well-nourished.   HENT:   Head: Normocephalic and atraumatic.   Right Ear: External ear normal.   Left Ear: External ear normal.   Eyes: Conjunctivae and EOM are normal. Pupils are equal, round, and reactive to light.   Neck: Neck supple.   Normal range of motion.  Cardiovascular:  Normal rate, regular rhythm, normal heart sounds and intact distal pulses.           Pulmonary/Chest: Breath sounds normal.   Abdominal: Abdomen is soft. Bowel sounds are normal.   Musculoskeletal:         General: Normal range of motion.      Cervical back: Normal range of motion and neck supple.     Neurological: She is alert and oriented to person, place, and time. GCS score is 15. GCS eye subscore is 4. GCS verbal subscore is 5. GCS motor subscore is 6.   Skin: Skin is warm and dry. Capillary refill takes less than 2 seconds.   Psychiatric: Her behavior is normal. Judgment and thought content normal. Her mood appears anxious. Her affect is labile.         ED Course    Procedures  Labs Reviewed - No data to display       Imaging Results    None          Medications   ketorolac injection 60 mg (60 mg Intramuscular Given 12/27/24 1845)   oxyCODONE-acetaminophen 5-325 mg per tablet 2 tablet (2 tablets Oral Given 12/27/24 1845)     Medical Decision Making  Amount and/or Complexity of Data Reviewed  External Data Reviewed: notes.     Details: EMR reviewed and  viewed - she filled 120 Percocet tablets on 12/16.    Risk  Prescription drug management.    Differential includes:  chronic pain disorder, opioid dependence, malingering, herniated disc.  I had a lengthy discussion with patient and with Sharmila Camp RN, also at the bedside.  I discussed her frequent ED use, and the fact that she is on a complex care plan due to her frequent visits.  I explained that the ED could not simply give her a morphine injection every time she comes in because she is simply coming in too frequently.  I further explained that if her pain is this poorly controlled, then her pain management MD needs to formulate a different plan of care.  She became visibly upset during the discussion and after I exited the room, was heard bickering with her friend who was also at the bedside during the conversation.  I compromised by offering her her home Percocet dose and IM ketorolac, to which she was agreeable.  She certainly exhibits borderline personality traits and her frequent ED visits across multiple sites ([facility] and [facility]) is strongly suggestive of drug-seeking behavior.                                  Clinical Impression:  Final diagnoses:  [M54.50, G89.29] Chronic left-sided low back pain without sciatica (Primary)          ED Disposition Condition    Discharge Stable          ED Prescriptions    None       Follow-up Information       Follow up With Specialties Details Why Contact Info    Follow up with your primary care provider in 2 weeks if not improved                 Garrick Eldridge,  MD  12/27/24 7745

## 2024-12-30 ENCOUNTER — HOSPITAL ENCOUNTER (EMERGENCY)
Facility: HOSPITAL | Age: 39
Discharge: HOME | End: 2024-12-30
Attending: EMERGENCY MEDICINE
Payer: COMMERCIAL

## 2024-12-30 VITALS
RESPIRATION RATE: 18 BRPM | TEMPERATURE: 99 F | HEART RATE: 113 BPM | SYSTOLIC BLOOD PRESSURE: 110 MMHG | DIASTOLIC BLOOD PRESSURE: 71 MMHG | OXYGEN SATURATION: 99 % | HEIGHT: 64 IN | BODY MASS INDEX: 33.29 KG/M2 | WEIGHT: 195 LBS

## 2024-12-30 DIAGNOSIS — M54.50 CHRONIC MIDLINE LOW BACK PAIN WITHOUT SCIATICA: Primary | ICD-10-CM

## 2024-12-30 DIAGNOSIS — G89.29 CHRONIC MIDLINE LOW BACK PAIN WITHOUT SCIATICA: Primary | ICD-10-CM

## 2024-12-30 DIAGNOSIS — M54.2 CHRONIC NECK PAIN: ICD-10-CM

## 2024-12-30 DIAGNOSIS — G89.29 CHRONIC NECK PAIN: ICD-10-CM

## 2024-12-30 PROCEDURE — 99284 EMERGENCY DEPT VISIT MOD MDM: CPT | Mod: 25

## 2024-12-30 PROCEDURE — 63600175 PHARM REV CODE 636 W HCPCS: Performed by: EMERGENCY MEDICINE

## 2024-12-30 PROCEDURE — 96372 THER/PROPH/DIAG INJ SC/IM: CPT | Performed by: EMERGENCY MEDICINE

## 2024-12-30 PROCEDURE — 25000003 PHARM REV CODE 250: Performed by: EMERGENCY MEDICINE

## 2024-12-30 RX ORDER — KETOROLAC TROMETHAMINE 30 MG/ML
60 INJECTION, SOLUTION INTRAMUSCULAR; INTRAVENOUS
Status: COMPLETED | OUTPATIENT
Start: 2024-12-30 | End: 2024-12-30

## 2024-12-30 RX ORDER — OXYCODONE AND ACETAMINOPHEN 5; 325 MG/1; MG/1
2 TABLET ORAL
Status: COMPLETED | OUTPATIENT
Start: 2024-12-30 | End: 2024-12-30

## 2024-12-30 RX ADMIN — KETOROLAC TROMETHAMINE 60 MG: 30 INJECTION, SOLUTION INTRAMUSCULAR at 06:12

## 2024-12-30 RX ADMIN — OXYCODONE HYDROCHLORIDE AND ACETAMINOPHEN 2 TABLET: 5; 325 TABLET ORAL at 06:12
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
## 2025-02-20 ENCOUNTER — HOSPITAL ENCOUNTER (EMERGENCY)
Facility: HOSPITAL | Age: 40
Discharge: HOME | End: 2025-02-20
Attending: STUDENT IN AN ORGANIZED HEALTH CARE EDUCATION/TRAINING PROGRAM
Payer: COMMERCIAL

## 2025-02-20 VITALS
SYSTOLIC BLOOD PRESSURE: 129 MMHG | HEIGHT: 64 IN | WEIGHT: 185 LBS | RESPIRATION RATE: 16 BRPM | TEMPERATURE: 98 F | HEART RATE: 77 BPM | OXYGEN SATURATION: 100 % | DIASTOLIC BLOOD PRESSURE: 85 MMHG | BODY MASS INDEX: 31.58 KG/M2

## 2025-02-20 DIAGNOSIS — G89.29 ACUTE EXACERBATION OF CHRONIC LOW BACK PAIN: Primary | ICD-10-CM

## 2025-02-20 DIAGNOSIS — G89.4 CHRONIC PAIN SYNDROME: ICD-10-CM

## 2025-02-20 DIAGNOSIS — M54.50 ACUTE EXACERBATION OF CHRONIC LOW BACK PAIN: Primary | ICD-10-CM

## 2025-02-20 LAB
B-HCG UR QL: NEGATIVE
BACTERIA #/AREA URNS AUTO: ABNORMAL /HPF
BILIRUB UR QL STRIP.AUTO: NEGATIVE
CLARITY UR: CLEAR
COLOR UR AUTO: YELLOW
GLUCOSE UR QL STRIP: NEGATIVE
HGB UR QL STRIP: ABNORMAL
KETONES UR QL STRIP: NEGATIVE
LEUKOCYTE ESTERASE UR QL STRIP: NEGATIVE
MUCOUS THREADS URNS QL MICRO: ABNORMAL /LPF
NITRITE UR QL STRIP: NEGATIVE
PH UR STRIP: 6 [PH]
PROT UR QL STRIP: NEGATIVE
RBC #/AREA URNS AUTO: ABNORMAL /HPF
SP GR UR STRIP.AUTO: >=1.03 (ref 1–1.03)
SQUAMOUS #/AREA URNS AUTO: ABNORMAL /HPF
UROBILINOGEN UR STRIP-ACNC: 0.2
WBC #/AREA URNS AUTO: ABNORMAL /HPF

## 2025-02-20 PROCEDURE — 81003 URINALYSIS AUTO W/O SCOPE: CPT | Performed by: STUDENT IN AN ORGANIZED HEALTH CARE EDUCATION/TRAINING PROGRAM

## 2025-02-20 PROCEDURE — 63600175 PHARM REV CODE 636 W HCPCS: Performed by: STUDENT IN AN ORGANIZED HEALTH CARE EDUCATION/TRAINING PROGRAM

## 2025-02-20 PROCEDURE — 81025 URINE PREGNANCY TEST: CPT | Performed by: STUDENT IN AN ORGANIZED HEALTH CARE EDUCATION/TRAINING PROGRAM

## 2025-02-20 PROCEDURE — 96372 THER/PROPH/DIAG INJ SC/IM: CPT | Performed by: STUDENT IN AN ORGANIZED HEALTH CARE EDUCATION/TRAINING PROGRAM

## 2025-02-20 PROCEDURE — 99284 EMERGENCY DEPT VISIT MOD MDM: CPT | Mod: 25

## 2025-02-20 RX ORDER — KETOROLAC TROMETHAMINE 30 MG/ML
60 INJECTION, SOLUTION INTRAMUSCULAR; INTRAVENOUS
Status: COMPLETED | OUTPATIENT
Start: 2025-02-20 | End: 2025-02-20

## 2025-02-20 RX ORDER — DEXAMETHASONE SODIUM PHOSPHATE 4 MG/ML
8 INJECTION, SOLUTION INTRA-ARTICULAR; INTRALESIONAL; INTRAMUSCULAR; INTRAVENOUS; SOFT TISSUE
Status: COMPLETED | OUTPATIENT
Start: 2025-02-20 | End: 2025-02-20

## 2025-02-20 RX ADMIN — KETOROLAC TROMETHAMINE 60 MG: 30 INJECTION, SOLUTION INTRAMUSCULAR at 08:02

## 2025-02-20 RX ADMIN — DEXAMETHASONE SODIUM PHOSPHATE 8 MG: 4 INJECTION INTRA-ARTICULAR; INTRALESIONAL; INTRAMUSCULAR; INTRAVENOUS; SOFT TISSUE at 08:02
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
## 2025-02-27 ENCOUNTER — HOSPITAL ENCOUNTER (EMERGENCY)
Facility: HOSPITAL | Age: 40
Discharge: HOME | End: 2025-02-27
Attending: EMERGENCY MEDICINE
Payer: COMMERCIAL

## 2025-02-27 VITALS
RESPIRATION RATE: 20 BRPM | SYSTOLIC BLOOD PRESSURE: 139 MMHG | HEART RATE: 99 BPM | BODY MASS INDEX: 32.44 KG/M2 | DIASTOLIC BLOOD PRESSURE: 91 MMHG | TEMPERATURE: 98 F | HEIGHT: 64 IN | OXYGEN SATURATION: 100 % | WEIGHT: 190 LBS

## 2025-02-27 DIAGNOSIS — M54.41 CHRONIC MIDLINE LOW BACK PAIN WITH BILATERAL SCIATICA: Primary | ICD-10-CM

## 2025-02-27 DIAGNOSIS — M54.42 CHRONIC MIDLINE LOW BACK PAIN WITH BILATERAL SCIATICA: Primary | ICD-10-CM

## 2025-02-27 DIAGNOSIS — G89.29 CHRONIC MIDLINE LOW BACK PAIN WITH BILATERAL SCIATICA: Primary | ICD-10-CM

## 2025-02-27 PROCEDURE — 99283 EMERGENCY DEPT VISIT LOW MDM: CPT

## 2025-02-27 PROCEDURE — 25000003 PHARM REV CODE 250

## 2025-02-27 RX ORDER — OXYCODONE AND ACETAMINOPHEN 5; 325 MG/1; MG/1
2 TABLET ORAL
Refills: 0 | Status: COMPLETED | OUTPATIENT
Start: 2025-02-27 | End: 2025-02-27

## 2025-02-27 RX ADMIN — OXYCODONE HYDROCHLORIDE AND ACETAMINOPHEN 2 TABLET: 5; 325 TABLET ORAL at 08:02

## 2025-02-28 NOTE — ED PROVIDER NOTES
Encounter Date: 2/27/2025       History     Chief Complaint   Patient presents with    Back Pain     Chronic back pain. Says pain management doctor told her to come to er to get a shot to hold her over till her appointment tomorrow     39 y.o. White female with a history of chronic back pain presents to Emergency Department with a chief complaint of atraumatic back pain. Symptoms began 1 week ago and have been constant since onset. Patient requesting IM narcotic injection. This is patient's 5th ER visit in the past week for same complaints. In pain management. Reports pain radiates down LLE.  Has intermittent numbness/tingling down BLE. The patient denies CP, SOB, bowel/bladder incontinence, saddle anesthesia, fever, or vomiting.. No other reported symptoms at this time      The history is provided by the patient. No  was used.   Back Pain   This is a chronic problem. The current episode started several days ago. The problem occurs throughout the day. The problem has been unchanged. The pain is associated with no known injury. The pain is present in the lumbar spine. The pain radiates to the left leg. The pain is The same all the time. Stiffness is present All day. Pertinent negatives include no chest pain, no fever, no weight loss, no headaches, no abdominal pain, no abdominal swelling, no bowel incontinence, no perianal numbness, no bladder incontinence, no dysuria, no paresthesias, no paresis, no tingling and no weakness. She has tried analgesics for the symptoms. The treatment provided no relief.     Review of patient's allergies indicates:  No Known Allergies  Past Medical History:   Diagnosis Date    Chronic back pain     H/O gastric sleeve      Past Surgical History:   Procedure Laterality Date    abominal plasty      BACK SURGERY      BARIATRIC SURGERY      CHOLECYSTECTOMY      gastric sleeve      INJECTION OF FACET JOINT Bilateral 08/02/2022    Procedure: bilateral lumbar facet injection  L4-5 and L5-S1 (Iv Sedation);  Surgeon: Robert Reynolds Jr., MD;  Location: [facility];  Service: Pain Management;  Laterality: Bilateral;    SPINAL FUSION       No family history on file.  Social History[1]  Review of Systems   Constitutional:  Negative for chills, diaphoresis, fatigue, fever and weight loss.   Eyes:  Negative for photophobia.   Respiratory:  Negative for cough, shortness of breath, wheezing and stridor.    Cardiovascular:  Negative for chest pain, palpitations and leg swelling.   Gastrointestinal:  Negative for abdominal pain, bowel incontinence, nausea and vomiting.   Genitourinary:  Negative for bladder incontinence, dysuria, flank pain and frequency.   Musculoskeletal:  Positive for back pain and myalgias. Negative for gait problem and joint swelling.   Skin:  Negative for color change and wound.   Neurological:  Negative for tingling, syncope, weakness, headaches and paresthesias.   All other systems reviewed and are negative.      Physical Exam     Initial Vitals   BP Pulse Resp Temp SpO2   02/27/25 1920 02/27/25 1919 02/27/25 1919 02/27/25 1919 02/27/25 1919   (!) 139/91 99 18 98.3 °F (36.8 °C) 99 %      MAP       --                Physical Exam    Nursing note and vitals reviewed.  Constitutional: She appears well-developed and well-nourished. She is not diaphoretic. She is cooperative.  Non-toxic appearance. No distress.   HENT:   Head: Normocephalic and atraumatic.   Right Ear: External ear normal.   Left Ear: External ear normal.   Nose: Nose normal.   Eyes: Conjunctivae and EOM are normal. Pupils are equal, round, and reactive to light.   Neck: Neck supple.   Normal range of motion.  Cardiovascular:  Normal pulses.           Pulses:       Radial pulses are 2+ on the right side and 2+ on the left side.        Posterior tibial pulses are 2+ on the right side and 2+ on the left side.   Pulmonary/Chest: Effort normal. No tachypnea and no bradypnea. No respiratory distress.    Musculoskeletal:         General: Tenderness present. Normal range of motion.      Cervical back: Normal, normal range of motion and neck supple.      Thoracic back: Normal.      Lumbar back: Tenderness present. No swelling, edema or deformity.      Comments: Tenderness noted to lumbar spine with radiation down LLE. FROM noted. BUE/BLE equal strength. CMS intact. All other adjacent joints otherwise normal.        Neurological: She is alert and oriented to person, place, and time. She has normal strength. No sensory deficit. GCS score is 15. GCS eye subscore is 4. GCS verbal subscore is 5. GCS motor subscore is 6.   Skin: Skin is warm and dry. Capillary refill takes less than 2 seconds.   Psychiatric: She has a normal mood and affect. Thought content normal.         ED Course   Procedures  Labs Reviewed - No data to display       Imaging Results    None          Medications   oxyCODONE-acetaminophen 5-325 mg per tablet 2 tablet (2 tablets Oral Given 2/27/25 2013)     Medical Decision Making  Patient awake, alert, has non-labored breathing, and follows commands appropriately. Arrived to ED due to lower back pain. This is chronic for patient. Sees Dr. Urena and in pain management. Denies injury/trauma or  complaints. NAD noted.     Judging by the patient's chief complaint and pertinent history, the patient has the following possible differential diagnoses, including but not limited to the following: Chronic Back Pain, Sciatica, Lumbar Strain    Some of these are deemed to be lower likelihood and some more likely based on my physical exam and history combined with possible lab work and/or imaging studies. Please see the pertinent studies, and refer to the HPI. Some of these diagnoses will take further evaluation to fully rule out, perhaps as an outpatient and the patient was encouraged to follow up when discharged for more comprehensive evaluation.       Amount and/or Complexity of Data Reviewed  Discussion of  management or test interpretation with external provider(s): Discussed plan of care and interventions with patient. Agreed to and aware of plan of care. Comfortable being discharged home. Patient discharged home. Patient denies new or additional complaints; no further tests indicated at this time. Verbalized understanding of instructions. No emergent or apparent distress noted prior to discharge. Strict ER return precautions given.       Risk  Prescription drug management.               ED Course as of 02/27/25 2016   Thu Feb 27, 2025 2012 Patient has complex care plan. Had 4 previous visits within the past week at different hospitals across the region for the same complaint. Will give PO dose of Percocet. Patient has no red flags or neuro deficits. States she has appointment with pain management on tomorrow. Patient denies recent injury/trauma, therefore, imaging not indicated at this time.  [JA]      ED Course User Index  [JA] Ladonna Fair, LY                           Clinical Impression:  Final diagnoses:  [M54.41, M54.42, G89.29] Chronic midline low back pain with bilateral sciatica (Primary)          ED Disposition Condition    Discharge Stable          ED Prescriptions    None       Follow-up Information       Follow up With Specialties Details Why Contact Info    Nic Guzman MD Family Medicine Schedule an appointment as soon as possible for a visit on 3/3/2025  [address] [address] [phone]      [facility] - Emergency Dept Emergency Medicine Go to  If symptoms worsen, As needed [id number]  [facility] [id number] [phone]    Follow up with pain management appointment on tomorrow.                   [1]   Social History  Tobacco Use    Smoking status: Never    Smokeless tobacco: Never   Substance Use Topics    Alcohol use: Never    Drug use: Never        Ladonna Fair NP  02/27/25 2023

## 2025-03-19 ENCOUNTER — HOSPITAL ENCOUNTER (EMERGENCY)
Facility: HOSPITAL | Age: 40
Discharge: LEFT BEFORE DISCHARGE | End: 2025-03-19
Attending: STUDENT IN AN ORGANIZED HEALTH CARE EDUCATION/TRAINING PROGRAM
Payer: COMMERCIAL

## 2025-03-19 VITALS
TEMPERATURE: 98 F | DIASTOLIC BLOOD PRESSURE: 84 MMHG | HEIGHT: 64 IN | HEART RATE: 90 BPM | OXYGEN SATURATION: 100 % | SYSTOLIC BLOOD PRESSURE: 104 MMHG | WEIGHT: 190 LBS | BODY MASS INDEX: 32.44 KG/M2 | RESPIRATION RATE: 18 BRPM

## 2025-03-19 DIAGNOSIS — M54.50 MIDLINE LOW BACK PAIN, UNSPECIFIED CHRONICITY, UNSPECIFIED WHETHER SCIATICA PRESENT: Primary | ICD-10-CM

## 2025-03-19 LAB — B-HCG UR QL: NEGATIVE

## 2025-03-19 PROCEDURE — 63600175 PHARM REV CODE 636 W HCPCS: Mod: JZ,TB

## 2025-03-19 PROCEDURE — 25000003 PHARM REV CODE 250

## 2025-03-19 PROCEDURE — 99284 EMERGENCY DEPT VISIT MOD MDM: CPT | Mod: 25

## 2025-03-19 PROCEDURE — 96372 THER/PROPH/DIAG INJ SC/IM: CPT

## 2025-03-19 PROCEDURE — 81025 URINE PREGNANCY TEST: CPT

## 2025-03-19 RX ORDER — OXYCODONE AND ACETAMINOPHEN 5; 325 MG/1; MG/1
2 TABLET ORAL
Refills: 0 | Status: COMPLETED | OUTPATIENT
Start: 2025-03-19 | End: 2025-03-19

## 2025-03-19 RX ORDER — KETOROLAC TROMETHAMINE 30 MG/ML
30 INJECTION, SOLUTION INTRAMUSCULAR; INTRAVENOUS
Status: COMPLETED | OUTPATIENT
Start: 2025-03-19 | End: 2025-03-19

## 2025-03-19 RX ADMIN — OXYCODONE HYDROCHLORIDE AND ACETAMINOPHEN 2 TABLET: 5; 325 TABLET ORAL at 06:03

## 2025-03-19 RX ADMIN — KETOROLAC TROMETHAMINE 30 MG: 30 INJECTION, SOLUTION INTRAMUSCULAR at 06:03

## 2025-03-19 NOTE — ED PROVIDER NOTES
Encounter Date: 3/19/2025       History     Chief Complaint   Patient presents with    Fall     The patient is a 39 y.o. female with a history of chronic back pain who presents to the Emergency Department with a chief complaint of low back pain after fall.  Patient reports she slipped on her tile and fell on her back and she was getting out of the shower 2 days ago.  Patient states that she is in pain management for chronic low back pain.  He was recently switched from Percocet to morphine.  However, patient reports that she has not been taking the morphine because it has not been helping with her pain.  She states that she did contact her pain management provider who advised her to come to the emergency department for evaluation.  Symptoms began 2 days ago and have been constant since onset. Her pain is currently rated as a 9/10 in severity and described as aching with no radiation. Associated symptoms include nothing. Symptoms are aggravated with movement and there are no alleviating factors. The patient denies paresthesias, extremity weakness, or bowel/bladder incontinence. She reports taking nothing prior to arrival with no relief of symptoms. No other reported symptoms at this time.          Review of patient's allergies indicates:  No Known Allergies  Past Medical History:   Diagnosis Date    Chronic back pain     H/O gastric sleeve      Past Surgical History:   Procedure Laterality Date    abominal plasty      BACK SURGERY      BARIATRIC SURGERY      CHOLECYSTECTOMY      gastric sleeve      INJECTION OF FACET JOINT Bilateral 08/02/2022    Procedure: bilateral lumbar facet injection L4-5 and L5-S1 (Iv Sedation);  Surgeon: Robert Reynolds Jr., MD;  Location: [facility];  Service: Pain Management;  Laterality: Bilateral;    SPINAL FUSION       No family history on file.  Social History[1]  Review of Systems   Constitutional:  Negative for fever.   HENT:  Negative for sore throat.    Respiratory:  Negative  for shortness of breath.    Cardiovascular:  Negative for chest pain.   Gastrointestinal:  Negative for nausea.   Genitourinary:  Negative for dysuria.   Musculoskeletal:  Positive for back pain.   Skin:  Negative for rash.   Neurological:  Negative for weakness.   Hematological:  Does not bruise/bleed easily.   All other systems reviewed and are negative.      Physical Exam     Initial Vitals [03/19/25 1805]   BP Pulse Resp Temp SpO2   104/84 90 20 98.4 °F (36.9 °C) 100 %      MAP       --         Physical Exam    Nursing note and vitals reviewed.  Constitutional: She appears well-developed and well-nourished.   HENT:   Head: Normocephalic.   Right Ear: Hearing and tympanic membrane normal.   Left Ear: Hearing and tympanic membrane normal. Mouth/Throat: Uvula is midline, oropharynx is clear and moist and mucous membranes are normal.   Eyes: Conjunctivae and EOM are normal. Pupils are equal, round, and reactive to light.   Cardiovascular:  Normal rate, regular rhythm, normal heart sounds and normal pulses.           Pulmonary/Chest: Effort normal and breath sounds normal.   Abdominal: Abdomen is soft. Bowel sounds are normal. There is no abdominal tenderness.   Musculoskeletal:      Lumbar back: Bony tenderness present.     Lymphadenopathy:     She has no cervical adenopathy.   Neurological: She is alert. GCS eye subscore is 4. GCS verbal subscore is 5. GCS motor subscore is 6.   Skin: Skin is warm, dry and intact. Capillary refill takes less than 2 seconds.         ED Course   Procedures  Labs Reviewed   PREGNANCY TEST, URINE RAPID - Normal       Result Value    hCG Qualitative, Urine Negative            Imaging Results    None          Medications   oxyCODONE-acetaminophen 5-325 mg per tablet 2 tablet (2 tablets Oral Given 3/19/25 1837)   ketorolac injection 30 mg (30 mg Intramuscular Given 3/19/25 1837)     Medical Decision Making  The patient is a 39 y.o. female with a history of chronic back pain who presents  to the Emergency Department with a chief complaint of low back pain after fall.  Patient reports she slipped on her tile and fell on her back and she was getting out of the shower 2 days ago.  Patient states that she is in pain management for chronic low back pain.  He was recently switched from Percocet to morphine.  However, patient reports that she has not been taking the morphine because it has not been helping with her pain.  She states that she did contact her pain management provider who advised her to come to the emergency department for evaluation.  Symptoms began 2 days ago and have been constant since onset. Her pain is currently rated as a 9/10 in severity and described as aching with no radiation. Associated symptoms include nothing. Symptoms are aggravated with movement and there are no alleviating factors. The patient denies paresthesias, extremity weakness, or bowel/bladder incontinence. She reports taking nothing prior to arrival with no relief of symptoms. No other reported symptoms at this time.      Judging by the patient's chief complaint and pertinent history, the patient has the following possible differential diagnoses, including but not limited to the following.  Some of these are deemed to be lower likelihood and some more likely based on my physical exam and history combined with possible lab work and/or imaging studies.   Please see the pertinent studies, and refer to the HPI.  Some of these diagnoses will take further evaluation to fully rule out, perhaps as an outpatient and the patient was encouraged to follow up when discharged for more comprehensive evaluation.    Lumbar strain, lumbar fracture, lumbar radiculopathy, chronic back pain    Amount and/or Complexity of Data Reviewed  Radiology: ordered.    Risk  Prescription drug management.               ED Course as of 03/19/25 1914   Wed Mar 19, 2025   1912 Patient eloped.  [LM]      ED Course User Index  [LM] Deng White, LY                            Clinical Impression:  Final diagnoses:  [M54.50] Midline low back pain, unspecified chronicity, unspecified whether sciatica present (Primary)          ED Disposition Condition    Eloped Stable                  [1]   Social History  Tobacco Use    Smoking status: Never    Smokeless tobacco: Never   Substance Use Topics    Alcohol use: Never    Drug use: Never        Deng White NP  03/19/25 1914

## 2025-03-19 NOTE — ED TRIAGE NOTES
C/o mid/low back pain after standing height slip/trip fall 2 days ago. Hx of l4-l5 fusion and known l2-l3 issues. Sees pain management but pain has worsened

## 2025-03-21 ENCOUNTER — HOSPITAL ENCOUNTER (EMERGENCY)
Facility: HOSPITAL | Age: 40
Discharge: HOME | End: 2025-03-21
Attending: EMERGENCY MEDICINE
Payer: COMMERCIAL

## 2025-03-21 VITALS
DIASTOLIC BLOOD PRESSURE: 81 MMHG | WEIGHT: 190 LBS | HEART RATE: 85 BPM | OXYGEN SATURATION: 99 % | TEMPERATURE: 98 F | SYSTOLIC BLOOD PRESSURE: 113 MMHG | HEIGHT: 65 IN | RESPIRATION RATE: 16 BRPM | BODY MASS INDEX: 31.65 KG/M2

## 2025-03-21 DIAGNOSIS — M54.9 BACK PAIN, UNSPECIFIED BACK LOCATION, UNSPECIFIED BACK PAIN LATERALITY, UNSPECIFIED CHRONICITY: Primary | ICD-10-CM

## 2025-03-21 PROCEDURE — 99283 EMERGENCY DEPT VISIT LOW MDM: CPT

## 2025-03-21 PROCEDURE — 25000003 PHARM REV CODE 250: Performed by: EMERGENCY MEDICINE

## 2025-03-21 RX ORDER — HYDROCODONE BITARTRATE AND ACETAMINOPHEN 10; 325 MG/1; MG/1
1 TABLET ORAL
Refills: 0 | Status: COMPLETED | OUTPATIENT
Start: 2025-03-21 | End: 2025-03-21

## 2025-03-21 RX ADMIN — HYDROCODONE BITARTRATE AND ACETAMINOPHEN 1 TABLET: 10; 325 TABLET ORAL at 10:03

## 2025-03-22 ENCOUNTER — HOSPITAL ENCOUNTER (EMERGENCY)
Facility: HOSPITAL | Age: 40
Discharge: LEFT BEFORE DISCHARGE | End: 2025-03-22
Attending: EMERGENCY MEDICINE
Payer: COMMERCIAL

## 2025-03-22 VITALS
RESPIRATION RATE: 18 BRPM | HEIGHT: 64 IN | TEMPERATURE: 97 F | HEART RATE: 87 BPM | WEIGHT: 190 LBS | SYSTOLIC BLOOD PRESSURE: 130 MMHG | DIASTOLIC BLOOD PRESSURE: 97 MMHG | OXYGEN SATURATION: 98 % | BODY MASS INDEX: 32.44 KG/M2
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
## 2025-03-22 DIAGNOSIS — M54.50 CHRONIC MIDLINE LOW BACK PAIN WITHOUT SCIATICA: ICD-10-CM

## 2025-03-22 DIAGNOSIS — G89.29 CHRONIC MIDLINE LOW BACK PAIN WITHOUT SCIATICA: ICD-10-CM

## 2025-03-22 DIAGNOSIS — S46.812S STRAIN OF LEFT TRAPEZIUS MUSCLE, SEQUELA: ICD-10-CM

## 2025-03-22 LAB — B-HCG UR QL: NEGATIVE

## 2025-03-22 PROCEDURE — 99283 EMERGENCY DEPT VISIT LOW MDM: CPT | Mod: 25

## 2025-03-22 PROCEDURE — 25000003 PHARM REV CODE 250

## 2025-03-22 PROCEDURE — 81025 URINE PREGNANCY TEST: CPT

## 2025-03-22 RX ORDER — OXYCODONE AND ACETAMINOPHEN 10; 325 MG/1; MG/1
1 TABLET ORAL
Refills: 0 | Status: COMPLETED | OUTPATIENT
Start: 2025-03-22 | End: 2025-03-22

## 2025-03-22 RX ADMIN — OXYCODONE HYDROCHLORIDE AND ACETAMINOPHEN 1 TABLET: 10; 325 TABLET ORAL at 08:03

## 2025-03-22 NOTE — ED PROVIDER NOTES
Encounter Date: 3/21/2025       History     Chief Complaint   Patient presents with    Back Pain     Was seen Monday for back pain after falling in shower c/o continued back pain. Sees pain management, who suggested that she come back in for another evaluation.      39-year-old female history of spinal fusion, bariatric surgery, cholecystectomy on pain management presenting with left lower lumbar back pain since she fell 4 days ago.  Patient says her morphine prescribed by pain management it is not helping.  Patient says her pain management doctor told her to come to the emergency department and get a prescription for Norco till they can see her next week.  She has been seen twice in the emergency department since she fell.  Patient said she had an x-ray of her back which was normal.  She was seen earlier today at [facility] and was prescribed prednisone and Robaxin.     The history is provided by the patient.     Review of patient's allergies indicates:  No Known Allergies  Past Medical History:   Diagnosis Date    Chronic back pain     H/O gastric sleeve      Past Surgical History:   Procedure Laterality Date    abominal plasty      BACK SURGERY      BARIATRIC SURGERY      CHOLECYSTECTOMY      gastric sleeve      INJECTION OF FACET JOINT Bilateral 08/02/2022    Procedure: bilateral lumbar facet injection L4-5 and L5-S1 (Iv Sedation);  Surgeon: Robert Reynolds Jr., MD;  Location: [facility];  Service: Pain Management;  Laterality: Bilateral;    SPINAL FUSION       No family history on file.  Social History[1]  Review of Systems   Constitutional:  Negative for chills, diaphoresis, fatigue and fever.   HENT:  Negative for congestion, drooling, ear discharge, ear pain, postnasal drip, rhinorrhea, sinus pressure, sinus pain, sore throat and tinnitus.    Eyes:  Negative for discharge.   Respiratory:  Negative for cough, chest tightness, shortness of breath and wheezing.    Cardiovascular:  Negative for chest pain and  palpitations.   Gastrointestinal:  Negative for abdominal pain, diarrhea, nausea and vomiting.   Genitourinary:  Negative for frequency, hematuria and urgency.   Musculoskeletal:  Positive for back pain. Negative for neck pain and neck stiffness.   Skin:  Negative for rash.   Neurological:  Negative for syncope, weakness, light-headedness, numbness and headaches.   Psychiatric/Behavioral:  Negative for suicidal ideas.        Physical Exam     Initial Vitals [03/21/25 2111]   BP Pulse Resp Temp SpO2   113/81 85 18 98.1 °F (36.7 °C) 99 %      MAP       --         Physical Exam    Nursing note and vitals reviewed.  Constitutional: No distress.   Eyes: Conjunctivae are normal.   Neck:   Normal range of motion.  Cardiovascular:  Normal rate.           Pulmonary/Chest: Breath sounds normal. No respiratory distress.   Abdominal: There is no abdominal tenderness.   Musculoskeletal:         General: No tenderness. Normal range of motion.      Cervical back: Normal range of motion.      Lumbar back: No bony tenderness. Negative right straight leg raise test and negative left straight leg raise test.     Neurological: She is alert and oriented to person, place, and time. She has normal strength. No sensory deficit.   Skin: No rash noted. No erythema.         ED Course   Procedures  Labs Reviewed - No data to display       Imaging Results    None          Medications   HYDROcodone-acetaminophen  mg per tablet 1 tablet (1 tablet Oral Given 3/21/25 2228)     Medical Decision Making  Medical Decision Making  Problem list/ differential diagnosis including but not limited to:  Ligament injury, muscle strain, disc herniation, radiculopathy, epidural compression (cauda equina/ conus medullaris/ abscess/ hematoma), fracture, stenosis, myelitis, osteomyelitis, discitis, kidney stone/ infection, obstruction, dissection/aneurysm, perforated ulcer    Patient's chronic illnesses impacting care:  Pain management    Diagnostic test  considered but not ordered:    My interpretations:  Negative pregnancy test on March 19th    Radiology reports      Discussion of case with external qualified healthcare professionals:  Not applicable    Review of external notes( inpt, ems, NH, clinic):      Decision rules/scores:    Medications reviewed: morphine  Medications ordered in the ER: norco  Discharge prescriptions:    Social variables possible impacting patient's healthcare:    Code status/discussion    Shared decision making:    Consideration for admission versus discharge: stable for discharge     Risk  Prescription drug management.                                      Clinical Impression:  Final diagnoses:  [M54.9] Back pain, unspecified back location, unspecified back pain laterality, unspecified chronicity (Primary)          ED Disposition Condition    Discharge Stable          ED Prescriptions    None       Follow-up Information    None          Michael Daley MD  03/21/25 2237         [1]   Social History  Tobacco Use    Smoking status: Never    Smokeless tobacco: Never   Substance Use Topics    Alcohol use: Never    Drug use: Never        Michael Daley MD  03/21/25 5742

## 2025-03-23 NOTE — ED PROVIDER NOTES
Encounter Date: 3/22/2025       History     Chief Complaint   Patient presents with    Fall     Pt states she fell Monday.  C/O lower back and left shoulder pain     39 y.o. White female with a history of chronic back pain presents to Emergency Department with a chief complaint of lower back pain. Symptoms began several days ago after a fall and have been constant since onset. Patient reports she slipped and fell onto her back. This is patient's 4th visit regarding back pain. In pain management. States Morphine is not working and she discarded the medication due to this. Associated symptoms include L shoulder pain. Symptoms are aggravated with palpation and there are no alleviating factors. The patient denies CP, SOB, fever, LOC, head injury, incontinence, weakness, or vomiting. Patient requesting script for narcotics. No other reported symptoms at this time      The history is provided by the patient. No  was used.   Fall  The accident occurred several days ago. She landed on A hard floor. There was no blood loss. The pain is present in the back and left shoulder. She was Ambulatory at the scene. There was No entrapment after the fall. There was No drug use involved in the accident. There was No alcohol use involved in the accident. Associated symptoms include back pain. Pertinent negatives include no neck pain, no paresthesias, no paralysis, no visual change, no fever, no numbness, no abdominal pain, no bowel incontinence, no nausea, no vomiting, no hematuria, no headaches, no hearing loss and no loss of consciousness.     Review of patient's allergies indicates:  No Known Allergies  Past Medical History:   Diagnosis Date    Chronic back pain     H/O gastric sleeve      Past Surgical History:   Procedure Laterality Date    abominal plasty      BACK SURGERY      BARIATRIC SURGERY      CHOLECYSTECTOMY      gastric sleeve      INJECTION OF FACET JOINT Bilateral 08/02/2022    Procedure: bilateral  lumbar facet injection L4-5 and L5-S1 (Iv Sedation);  Surgeon: Robert Reynolds Jr., MD;  Location: [facility];  Service: Pain Management;  Laterality: Bilateral;    SPINAL FUSION       No family history on file.  Social History[1]  Review of Systems   Constitutional:  Negative for diaphoresis, fatigue and fever.   Eyes:  Negative for photophobia and visual disturbance.   Respiratory:  Negative for cough, shortness of breath, wheezing and stridor.    Cardiovascular:  Negative for palpitations and leg swelling.   Gastrointestinal:  Negative for abdominal pain, bowel incontinence, nausea and vomiting.   Genitourinary:  Negative for hematuria.   Musculoskeletal:  Positive for back pain and myalgias. Negative for gait problem, joint swelling and neck pain.   Neurological:  Negative for tremors, loss of consciousness, weakness, numbness, headaches and paresthesias.   All other systems reviewed and are negative.      Physical Exam     Initial Vitals [03/22/25 2006]   BP Pulse Resp Temp SpO2   (!) 130/97 87 20 97.3 °F (36.3 °C) 98 %      MAP       --         Physical Exam    Nursing note and vitals reviewed.  Constitutional: She appears well-developed and well-nourished. She is not diaphoretic. She is cooperative.  Non-toxic appearance. No distress.   HENT:   Head: Normocephalic and atraumatic.   Right Ear: External ear normal.   Left Ear: External ear normal.   Nose: Nose normal.   Eyes: Conjunctivae and EOM are normal. Pupils are equal, round, and reactive to light.   Neck: Neck supple.   Normal range of motion.   Full passive range of motion without pain.     Cardiovascular:  Normal rate, regular rhythm, normal heart sounds, intact distal pulses and normal pulses.           Pulmonary/Chest: Effort normal and breath sounds normal. No tachypnea and no bradypnea. No respiratory distress. She has no decreased breath sounds. She has no wheezes. She has no rhonchi. She has no rales. She exhibits no tenderness.    Abdominal: Abdomen is soft. Bowel sounds are normal. She exhibits no distension. There is no abdominal tenderness. There is no rebound.   Musculoskeletal:         General: Tenderness present. Normal range of motion.      Cervical back: Normal, full passive range of motion without pain, normal range of motion and neck supple. No spinous process tenderness or muscular tenderness. Normal range of motion.      Thoracic back: Normal.      Lumbar back: Tenderness present. No swelling, edema or deformity.        Back:       Comments: Tenderness noted to outlined areas. FROM noted. CMS intact. No step offs or deformities noted. No pain to L shoulder joint. .djaj       Neurological: She is alert and oriented to person, place, and time. She has normal strength. No sensory deficit. GCS score is 15. GCS eye subscore is 4. GCS verbal subscore is 5. GCS motor subscore is 6.   Skin: Skin is warm and dry. Capillary refill takes less than 2 seconds.   Psychiatric: She has a normal mood and affect. Thought content normal.         ED Course   Procedures  Labs Reviewed   PREGNANCY TEST, URINE RAPID - Normal       Result Value    hCG Qualitative, Urine Negative            Imaging Results              X-Ray Lumbar Spine Ap And Lateral (In process)                      Medications   oxyCODONE-acetaminophen  mg per tablet 1 tablet (1 tablet Oral Given 3/22/25 2037)     Medical Decision Making  Patient awake, alert, has non-labored breathing, and follows commands appropriately. Arrived to ED due to back pain and L posterior trapezius muscle pain. Symptoms began 6 days ago after a fall. Afebrile. NAD Noted.     Judging by the patient's chief complaint and pertinent history, the patient has the following possible differential diagnoses, including but not limited to the following: Back Pain, Fracture, Musculoskeletal Strain     Some of these are deemed to be lower likelihood and some more likely based on my physical exam and history  combined with possible lab work and/or imaging studies. Please see the pertinent studies, and refer to the HPI. Some of these diagnoses will take further evaluation to fully rule out, perhaps as an outpatient and the patient was encouraged to follow up when discharged for more comprehensive evaluation.       Amount and/or Complexity of Data Reviewed  Labs: ordered.     Details: UPT negative.   Radiology: ordered and independent interpretation performed.     Details: Degenerative changes noted. Post-op changes of lumbar spine. NAF. Patient did not stay for results.   Discussion of management or test interpretation with external provider(s): Patient has been seen multiple times regarding chronic back pain and falls. Following complex care plan, no narcotics prescribed or IV/IM narcotics given. Patient left ER prior to results being given. It seems patient is coming to ER, getting dose of pain medication, and eloping. This is the second time patient has done this, this week.     Risk  OTC drugs.  Prescription drug management.               ED Course as of 03/22/25 2058   Sat Mar 22, 2025   2029 Patient has been seen three previous times this week for same complaints. Eloped from the ED on 03/19/25. Seen at [facility] and ER on yesterday. Discharged home on Robaxin and Prednisone.  [JA]   2056 Patient walked out of room and told staff she was going to use the restroom. Triage staff saw her leaving the premises. Eloped.  [JA]      ED Course User Index  [JA] Ladonna Fair, NP                           Clinical Impression:  Final diagnoses:  [W19.XXXS] Fall, sequela (Primary)  [M54.50, G89.29] Chronic midline low back pain without sciatica  [S46.812S] Strain of left trapezius muscle, sequela          ED Disposition Condition    Eloped Stable                  [1]   Social History  Tobacco Use    Smoking status: Never    Smokeless tobacco: Never   Substance Use Topics    Alcohol use: Never    Drug use: Never        Marv  Ladonna VENTURA NP  03/22/25 5410

## 2025-03-23 NOTE — ED NOTES
Patient seen walking towards fast track and when I asked her where she was going because she's not discharged the patient stated she was going to the restroom in triage. I said ok and after she's done she can go back to her room.

## 2025-03-23 NOTE — ED NOTES
Patient left premises after she lied about going to triage restroom. Pt was seen by our ER tech leaving premises.

## 2025-03-23 NOTE — ED NOTES
Patient asking for narcotic prescription because her pain management appt is not until 3/24/25. I spoke to our NP and she stated she is not giving any narcotic prescriptions due to our policy and her being in pain management. I relayed the NP's message to pt.

## 2025-04-26 ENCOUNTER — HOSPITAL ENCOUNTER (EMERGENCY)
Facility: HOSPITAL | Age: 40
Discharge: HOME | End: 2025-04-26
Attending: EMERGENCY MEDICINE
Payer: MEDICARE

## 2025-04-26 VITALS
BODY MASS INDEX: 31.65 KG/M2 | DIASTOLIC BLOOD PRESSURE: 68 MMHG | WEIGHT: 190 LBS | HEIGHT: 65 IN | RESPIRATION RATE: 20 BRPM | OXYGEN SATURATION: 100 % | TEMPERATURE: 98 F | HEART RATE: 55 BPM | SYSTOLIC BLOOD PRESSURE: 108 MMHG

## 2025-04-26 DIAGNOSIS — R52 PAIN: ICD-10-CM

## 2025-04-26 DIAGNOSIS — Z76.5 MALINGERING: ICD-10-CM

## 2025-04-26 DIAGNOSIS — G89.29 OTHER CHRONIC PAIN: Primary | ICD-10-CM

## 2025-04-26 LAB — B-HCG UR QL: NEGATIVE

## 2025-04-26 PROCEDURE — 99283 EMERGENCY DEPT VISIT LOW MDM: CPT | Mod: 25

## 2025-04-26 PROCEDURE — 81025 URINE PREGNANCY TEST: CPT | Performed by: EMERGENCY MEDICINE

## 2025-04-26 NOTE — ED PROVIDER NOTES
Encounter Date: 4/26/2025       History     Chief Complaint   Patient presents with    Back Pain     Back pain since 4/14/[id number]-nnhvesffn tailbone-mri scheduled 5/2/2025-took ibuprofen 800 mg and tylenol 650 mg at 2000-ran out of percocet-     Patient is a 39-year-old female presenting with tailbone pain.  She reports that on the 14th of April she fell while in [place] and was told she had fractured her tailbone.  She states she followed up with LOS in the scheduled her to have an MRI in the future.  She states she has continued to have pain.  She denies any new numbness tingling fever bowel or bladder incontinence.  She states she talked to her pain management doctor who told her to increase her pain medicine intake to treat the pain.  And now she states she ran out of her medicine.  She did went to urgent care and got a new prescription for Percocet.  She denies any fevers chills chest pain shortness of breath or other complaints at this time.  She states she ran out of her pain medication.        Review of patient's allergies indicates:  No Known Allergies  Past Medical History:   Diagnosis Date    Chronic back pain     H/O gastric sleeve      Past Surgical History:   Procedure Laterality Date    abominal plasty      BACK SURGERY      BARIATRIC SURGERY      CHOLECYSTECTOMY      gastric sleeve      INJECTION OF FACET JOINT Bilateral 08/02/2022    Procedure: bilateral lumbar facet injection L4-5 and L5-S1 (Iv Sedation);  Surgeon: Robert Reynolds Jr., MD;  Location: [facility];  Service: Pain Management;  Laterality: Bilateral;    SPINAL FUSION       No family history on file.  Social History[1]  Review of Systems    Physical Exam     Initial Vitals [04/26/25 0515]   BP Pulse Resp Temp SpO2   108/68 (!) 55 20 97.5 °F (36.4 °C) 100 %      MAP       --         Physical Exam    Nursing note and vitals reviewed.  Constitutional: She appears well-developed and well-nourished. No distress.   HENT:   Head:  Normocephalic and atraumatic.   Neck: Neck supple.   Cardiovascular:  Normal rate, regular rhythm and normal heart sounds.           No murmur heard.  Pulmonary/Chest: Breath sounds normal. No respiratory distress. She has no wheezes. She has no rhonchi.   Musculoskeletal:      Cervical back: Neck supple.      Comments: Diffuse tenderness to palpation to the sacrum without knee swelling or deformity noted.  No midline tenderness to palpation of the lower lumbar spine.     Neurological: She is alert and oriented to person, place, and time.   Skin: Skin is warm and dry.   Psychiatric: She has a normal mood and affect. Thought content normal.         ED Course   Procedures  Labs Reviewed   PREGNANCY TEST, URINE RAPID - Normal       Result Value    hCG Qualitative, Urine Negative            Imaging Results              X-Ray Sacrum And Coccyx (Preliminary result)  Result time 04/26/25 06:13:41      Preliminary result by Tony Hook MD (04/26/25 06:13:41)                   Narrative:    START OF REPORT:  Technique: AP and lateral plain film views of the sacrum and coccyx were performed.    Clinical history: Pain.    Findings: No acute fracture, dislocation or subluxation in seen in the visualized osseous structures of the pelvis, lumbosacral spine and coccyx.  Alignment: Within normal limits.  Curvature: Normal sacrococcygeal curvature.  Intervertebral disc spaces: Preserved.  Mineralization: Normal.  Fractures: None.    Miscellaneous:  Orthopedic hardware: An orthopedic disc device is seen at the the L5-S1 level.  Soft Tissues: Normal soft tissue swelling is seen projecting over the.      Impression:  1. No acute fracture, dislocation or subluxation in seen in the visualized osseous structures of the pelvis, lumbosacral spine and coccyx.  2. Details as above.                                         Medications - No data to display  Medical Decision Making   reviewed. Seven days ago the patient filled 90 tablets  of Morphine Sulfate. Two days ago the patient f illed 20 of percocet 5mg. Currently she should still have 93 tablets of narcotics though she states she ran out after the advice for pain management doctor was for her to increase her intake. She was not forth coming with this fill 7 days prior.     I discussed this with the patient.  I explained to her that I will not be writing any narcotic scripts as currently she is still should have plenty.    Today I will obtain an x-ray of the sacrum to evaluate for tailbone fracture.    X-ray negative for acute fracture today.  Patient was informed of results.    Problems Addressed:  Other chronic pain: chronic illness or injury    Amount and/or Complexity of Data Reviewed  Radiology: ordered. Decision-making details documented in ED Course.                                      Clinical Impression:  Final diagnoses:  [R52] Pain  [G89.29] Other chronic pain (Primary)  [Z76.5] Malingering          ED Disposition Condition    Discharge Stable          ED Prescriptions    None       Follow-up Information       Follow up With Specialties Details Why Contact Info    Nic Guzman MD Family Medicine Schedule an appointment as soon as possible for a visit   [address] [place] [id number] [phone]                   [1]   Social History  Tobacco Use    Smoking status: Never    Smokeless tobacco: Never   Substance Use Topics    Alcohol use: Never    Drug use: Never        Samantha Peñaloza MD  04/26/25 6013

## 2025-05-15 ENCOUNTER — HOSPITAL ENCOUNTER (EMERGENCY)
Facility: HOSPITAL | Age: 40
Discharge: LEFT BEFORE DISCHARGE | End: 2025-05-15
Attending: FAMILY MEDICINE
Payer: COMMERCIAL

## 2025-05-15 PROCEDURE — 99900041 HC LEFT WITHOUT BEING SEEN- EMERGENCY

## 2025-05-15 PROCEDURE — 99284 EMERGENCY DEPT VISIT MOD MDM: CPT

## 2025-05-16 ENCOUNTER — HOSPITAL ENCOUNTER (EMERGENCY)
Facility: HOSPITAL | Age: 40
Discharge: HOME | End: 2025-05-16
Attending: FAMILY MEDICINE
Payer: COMMERCIAL

## 2025-05-16 ENCOUNTER — HOSPITAL ENCOUNTER (EMERGENCY)
Facility: HOSPITAL | Age: 40
Discharge: HOME | End: 2025-05-16
Attending: EMERGENCY MEDICINE
Payer: COMMERCIAL

## 2025-05-16 VITALS
RESPIRATION RATE: 18 BRPM | TEMPERATURE: 98 F | HEART RATE: 80 BPM | SYSTOLIC BLOOD PRESSURE: 97 MMHG | WEIGHT: 187.31 LBS | DIASTOLIC BLOOD PRESSURE: 59 MMHG | OXYGEN SATURATION: 100 % | HEIGHT: 64 IN | BODY MASS INDEX: 31.98 KG/M2

## 2025-05-16 VITALS
TEMPERATURE: 99 F | BODY MASS INDEX: 31.62 KG/M2 | HEART RATE: 90 BPM | DIASTOLIC BLOOD PRESSURE: 55 MMHG | SYSTOLIC BLOOD PRESSURE: 135 MMHG | RESPIRATION RATE: 20 BRPM | WEIGHT: 190 LBS

## 2025-05-16 DIAGNOSIS — M53.3 COCCYODYNIA: Primary | ICD-10-CM

## 2025-05-16 PROCEDURE — 25000003 PHARM REV CODE 250: Performed by: FAMILY MEDICINE

## 2025-05-16 PROCEDURE — 63600175 PHARM REV CODE 636 W HCPCS: Mod: JZ,TB | Performed by: EMERGENCY MEDICINE

## 2025-05-16 PROCEDURE — 99284 EMERGENCY DEPT VISIT MOD MDM: CPT | Mod: 25

## 2025-05-16 PROCEDURE — 96374 THER/PROPH/DIAG INJ IV PUSH: CPT

## 2025-05-16 PROCEDURE — 63600175 PHARM REV CODE 636 W HCPCS: Mod: JZ,TB | Performed by: FAMILY MEDICINE

## 2025-05-16 PROCEDURE — 96372 THER/PROPH/DIAG INJ SC/IM: CPT | Performed by: EMERGENCY MEDICINE

## 2025-05-16 PROCEDURE — 96372 THER/PROPH/DIAG INJ SC/IM: CPT | Performed by: FAMILY MEDICINE

## 2025-05-16 RX ORDER — KETOROLAC TROMETHAMINE 30 MG/ML
30 INJECTION, SOLUTION INTRAMUSCULAR; INTRAVENOUS
Status: COMPLETED | OUTPATIENT
Start: 2025-05-16 | End: 2025-05-16

## 2025-05-16 RX ORDER — KETOROLAC TROMETHAMINE 30 MG/ML
60 INJECTION, SOLUTION INTRAMUSCULAR; INTRAVENOUS
Status: COMPLETED | OUTPATIENT
Start: 2025-05-16 | End: 2025-05-16

## 2025-05-16 RX ORDER — ONDANSETRON 4 MG/1
4 TABLET, ORALLY DISINTEGRATING ORAL
Status: COMPLETED | OUTPATIENT
Start: 2025-05-16 | End: 2025-05-16

## 2025-05-16 RX ORDER — HYDROMORPHONE HYDROCHLORIDE 1 MG/ML
1 INJECTION, SOLUTION INTRAMUSCULAR; INTRAVENOUS; SUBCUTANEOUS
Refills: 0 | Status: COMPLETED | OUTPATIENT
Start: 2025-05-16 | End: 2025-05-16

## 2025-05-16 RX ORDER — HYDROMORPHONE HYDROCHLORIDE 2 MG/ML
1 INJECTION, SOLUTION INTRAMUSCULAR; INTRAVENOUS; SUBCUTANEOUS
Refills: 0 | Status: COMPLETED | OUTPATIENT
Start: 2025-05-16 | End: 2025-05-16

## 2025-05-16 RX ORDER — METHYLPREDNISOLONE SOD SUCC 125 MG
125 VIAL (EA) INJECTION
Status: COMPLETED | OUTPATIENT
Start: 2025-05-16 | End: 2025-05-16

## 2025-05-16 RX ADMIN — ONDANSETRON 4 MG: 4 TABLET, ORALLY DISINTEGRATING ORAL at 12:05

## 2025-05-16 RX ADMIN — HYDROMORPHONE HYDROCHLORIDE 1 MG: 2 INJECTION INTRAMUSCULAR; INTRAVENOUS; SUBCUTANEOUS at 12:05

## 2025-05-16 RX ADMIN — KETOROLAC TROMETHAMINE 30 MG: 30 INJECTION, SOLUTION INTRAMUSCULAR; INTRAVENOUS at 12:05

## 2025-05-16 RX ADMIN — METHYLPREDNISOLONE SODIUM SUCCINATE 125 MG: 125 INJECTION, POWDER, FOR SOLUTION INTRAMUSCULAR; INTRAVENOUS at 12:05

## 2025-05-16 RX ADMIN — METHYLPREDNISOLONE SODIUM SUCCINATE 125 MG: 125 INJECTION, POWDER, FOR SOLUTION INTRAMUSCULAR; INTRAVENOUS at 09:05

## 2025-05-16 RX ADMIN — KETOROLAC TROMETHAMINE 60 MG: 60 INJECTION, SOLUTION INTRAMUSCULAR at 09:05

## 2025-05-16 RX ADMIN — HYDROMORPHONE HYDROCHLORIDE 1 MG: 1 INJECTION, SOLUTION INTRAMUSCULAR; INTRAVENOUS; SUBCUTANEOUS at 09:05

## 2025-05-16 NOTE — ED PROVIDER NOTES
Encounter Date: 5/15/2025       History     Chief Complaint   Patient presents with    Tailbone Pain     Pt to er with chronic coccyx pain. Seen in ER earlier today for same complaint     39-year-old female presents emergency room complaints of coccyx pain.  Patient reports that she has chronic pain in her coccyx area, currently in pain management.  She recently had a fall, reports having increased pain to the area.  Denies fever chills.  Denies nausea or vomiting.  Reports that her pain management physician had informed her that he will be switching her from [place] to Percocet starting next week.  Patient currently nontoxic appearing.  When talking she     Patient reports that she was recently seen at an outside ER.  On review of the medical records, was seen at our [facility].  Patient given muscle relaxor there, and reports that in his not helped with her coccyx pain    The history is provided by the patient.     Review of patient's allergies indicates:  No Known Allergies  Past Medical History:   Diagnosis Date    Chronic back pain     H/O gastric sleeve      Past Surgical History:   Procedure Laterality Date    abominal plasty      BACK SURGERY      BARIATRIC SURGERY      CHOLECYSTECTOMY      gastric sleeve      INJECTION OF FACET JOINT Bilateral 08/02/2022    Procedure: bilateral lumbar facet injection L4-5 and L5-S1 (Iv Sedation);  Surgeon: Robert Reynolds Jr., MD;  Location: [facility];  Service: Pain Management;  Laterality: Bilateral;    SPINAL FUSION       No family history on file.  Social History[1]  Review of Systems   Constitutional:  Negative for chills, fatigue and fever.   HENT:  Negative for ear pain, rhinorrhea and sore throat.    Eyes:  Negative for photophobia and pain.   Respiratory:  Negative for cough, shortness of breath and wheezing.    Cardiovascular:  Negative for chest pain.   Gastrointestinal:  Negative for abdominal pain, diarrhea, nausea and vomiting.   Genitourinary:   Negative for dysuria.   Neurological:  Negative for dizziness, weakness and headaches.   All other systems reviewed and are negative.      Physical Exam     Initial Vitals [05/15/25 2343]   BP Pulse Resp Temp SpO2   (!) 135/55 90 20 98.9 °F (37.2 °C) --      MAP       --         Physical Exam    Nursing note and vitals reviewed.  Constitutional: She appears well-developed and well-nourished. No distress.   HENT:   Head: Normocephalic and atraumatic.   Eyes: Conjunctivae and EOM are normal. Pupils are equal, round, and reactive to light.   Neck: Neck supple.   Normal range of motion.  Cardiovascular:  Normal rate, regular rhythm and normal heart sounds.           Pulmonary/Chest: No respiratory distress. She has no wheezes. She has no rhonchi. She has no rales.   Abdominal: Abdomen is soft. Bowel sounds are normal. She exhibits no distension. There is no abdominal tenderness. There is no rebound and no guarding.   Musculoskeletal:         General: Normal range of motion.      Cervical back: Normal range of motion and neck supple.      Comments: 5/5 strength bilateral lower extremities.  Mild tenderness to palpation to coccyx area.     Neurological: She is alert and oriented to person, place, and time.   Skin: Skin is warm and dry. Capillary refill takes less than 2 seconds. No erythema.   Psychiatric: She has a normal mood and affect. Her behavior is normal. Judgment and thought content normal.         ED Course   Procedures  Labs Reviewed - No data to display       Imaging Results    None          Medications   HYDROmorphone (PF) injection 1 mg (1 mg Intramuscular Given 5/16/25 0052)   ondansetron disintegrating tablet 4 mg (4 mg Oral Given 5/16/25 0053)   methylPREDNISolone sodium succinate injection 125 mg (125 mg Intramuscular Given 5/16/25 0053)   ketorolac injection 30 mg (30 mg Intramuscular Given 5/16/25 0052)     Medical Decision Making  39-year-old female presents emergency room complaints of exacerbation  of her chronic pain.  Currently appears well, neurologically intact.  Will treat symptomatically.  Will continue to monitor     Differential diagnosis: Acute on chronic coccyx pain    Risk  Prescription drug management.               ED Course as of 05/16/25 0111   Fri May 16, 2025   0110 Feeling much improved; stable for discharge to home.  ER precautions for any acute worsening. [MW]      ED Course User Index  [MW] Sunny Hansen MD                           Clinical Impression:  Final diagnoses:  [M53.3] Coccyodynia (Primary)          ED Disposition Condition    Discharge Stable          ED Prescriptions    None       Follow-up Information       Follow up With Specialties Details Why Contact Info    [facility] - Emergency Dept Emergency Medicine  As needed, If symptoms worsen [address]  [facility] [address] [phone]    Nic Guzman MD Family Medicine   [address] [address]  [phone]                   [1]   Social History  Tobacco Use    Smoking status: Never    Smokeless tobacco: Never   Substance Use Topics    Alcohol use: Never    Drug use: Never        Sunny Hansen MD  05/16/25 0111

## 2025-05-17 NOTE — ED PROVIDER NOTES
Encounter Date: 5/16/2025       History     Chief Complaint   Patient presents with    Tailbone Pain     Tailbone pain x1 week. Was dx with broken tailbone x2 weeks ago. Seen at ortho last night and received 3 shots that have been the only relief from the pain.      Patient with a known chronic back pain syndrome, chronic prescribed opiates, recent fall onto coccyx, worsened pain.  Patient with scheduled follow up in ortho, pain management.  Patient with multiple identical previous visits.  Patient is requesting temporizing meds, reports scheduled follow up on Monday.        Review of patient's allergies indicates:  No Known Allergies  Past Medical History:   Diagnosis Date    Chronic back pain     H/O gastric sleeve      Past Surgical History:   Procedure Laterality Date    abominal plasty      BACK SURGERY      BARIATRIC SURGERY      CHOLECYSTECTOMY      gastric sleeve      INJECTION OF FACET JOINT Bilateral 08/02/2022    Procedure: bilateral lumbar facet injection L4-5 and L5-S1 (Iv Sedation);  Surgeon: Robert Reynolds Jr., MD;  Location: [facility];  Service: Pain Management;  Laterality: Bilateral;    SPINAL FUSION       No family history on file.  Social History[1]  Review of Systems    Physical Exam     Initial Vitals [05/16/25 2119]   BP Pulse Resp Temp SpO2   (!) 148/99 68 20 97.9 °F (36.6 °C) 100 %      MAP       --         Physical Exam    Constitutional: She appears well-developed and well-nourished.   HENT:   Head: Normocephalic and atraumatic.   Eyes: Conjunctivae and EOM are normal. Pupils are equal, round, and reactive to light.   Neck: No tracheal deviation present.   Normal range of motion.  Cardiovascular:  Normal rate, regular rhythm and normal heart sounds.           Pulmonary/Chest: Breath sounds normal. No respiratory distress. She exhibits no tenderness.   Abdominal: Abdomen is soft. She exhibits no distension. There is no abdominal tenderness. There is no rebound.   Musculoskeletal:          General: No tenderness. Normal range of motion.      Cervical back: Normal range of motion.     Neurological: She is alert and oriented to person, place, and time. GCS score is 15. GCS eye subscore is 4. GCS verbal subscore is 5. GCS motor subscore is 6.   Skin: No rash and no abscess noted.   Psychiatric: She has a normal mood and affect. Her behavior is normal. Judgment and thought content normal.         ED Course   Procedures  Labs Reviewed - No data to display       Imaging Results    None          Medications   HYDROmorphone injection 1 mg (has no administration in time range)   methylPREDNISolone sodium succinate injection 125 mg (has no administration in time range)   ketorolac injection 60 mg (has no administration in time range)     Medical Decision Making  Patient with a chronic pain syndrome and considerable tolerance presents for exacerbation of pain after recent minor trauma.  It is in fact the weekend, and we do comprehend tolerance.  We find potential to benefit relatively low but potential for risk even lower.  We do plan to comply with request.  Patient comprehends necessity to follow up, relatively limited expectations.    Amount and/or Complexity of Data Reviewed  External Data Reviewed: notes.    Risk  Prescription drug management.  Parenteral controlled substances.                                      Clinical Impression:  Final diagnoses:  [M53.3] Coccyodynia (Primary)          ED Disposition Condition    Discharge Stable          ED Prescriptions    None       Follow-up Information       Follow up With Specialties Details Why Contact Info    [facility] - Emergency Dept Emergency Medicine  As needed, If symptoms worsen [address] [facility] [address] [phone]    Nic Guzman MD Family Medicine Call   [address] [address] [phone]                   [1]   Social History  Tobacco Use    Smoking status: Never    Smokeless tobacco: Never    Substance Use Topics    Alcohol use: Never    Drug use: Never        TheRah beltran MD  05/16/25 5178

## 2025-06-06 ENCOUNTER — HOSPITAL ENCOUNTER (EMERGENCY)
Facility: HOSPITAL | Age: 40
Discharge: HOME | End: 2025-06-06
Attending: FAMILY MEDICINE
Payer: COMMERCIAL

## 2025-06-06 ENCOUNTER — HOSPITAL ENCOUNTER (EMERGENCY)
Facility: HOSPITAL | Age: 40
Discharge: HOME | End: 2025-06-06
Attending: EMERGENCY MEDICINE
Payer: COMMERCIAL

## 2025-06-06 VITALS
HEART RATE: 62 BPM | BODY MASS INDEX: 32.44 KG/M2 | DIASTOLIC BLOOD PRESSURE: 62 MMHG | OXYGEN SATURATION: 100 % | SYSTOLIC BLOOD PRESSURE: 105 MMHG | RESPIRATION RATE: 20 BRPM | TEMPERATURE: 98 F | WEIGHT: 190 LBS | HEIGHT: 64 IN

## 2025-06-06 VITALS
TEMPERATURE: 98 F | HEART RATE: 58 BPM | WEIGHT: 195.5 LBS | BODY MASS INDEX: 33.38 KG/M2 | SYSTOLIC BLOOD PRESSURE: 105 MMHG | OXYGEN SATURATION: 100 % | RESPIRATION RATE: 18 BRPM | DIASTOLIC BLOOD PRESSURE: 70 MMHG | HEIGHT: 64 IN

## 2025-06-06 DIAGNOSIS — M53.3 COCCYDYNIA: Primary | ICD-10-CM

## 2025-06-06 PROCEDURE — 63600175 PHARM REV CODE 636 W HCPCS: Mod: JZ,TB | Performed by: FAMILY MEDICINE

## 2025-06-06 PROCEDURE — 99284 EMERGENCY DEPT VISIT MOD MDM: CPT | Mod: 25

## 2025-06-06 PROCEDURE — 99283 EMERGENCY DEPT VISIT LOW MDM: CPT | Mod: 25,27

## 2025-06-06 PROCEDURE — 96372 THER/PROPH/DIAG INJ SC/IM: CPT | Performed by: FAMILY MEDICINE

## 2025-06-06 PROCEDURE — 25000003 PHARM REV CODE 250: Performed by: PHYSICIAN ASSISTANT

## 2025-06-06 RX ORDER — PHENTERMINE HYDROCHLORIDE 37.5 MG/1
37.5 TABLET ORAL EVERY MORNING
COMMUNITY
Start: 2025-05-23

## 2025-06-06 RX ORDER — OXYCODONE AND ACETAMINOPHEN 10; 325 MG/1; MG/1
1 TABLET ORAL EVERY 6 HOURS PRN
COMMUNITY

## 2025-06-06 RX ORDER — METHYLPREDNISOLONE SOD SUCC 125 MG
125 VIAL (EA) INJECTION
Status: COMPLETED | OUTPATIENT
Start: 2025-06-06 | End: 2025-06-06

## 2025-06-06 RX ORDER — OXYCODONE AND ACETAMINOPHEN 5; 325 MG/1; MG/1
1 TABLET ORAL
Refills: 0 | Status: COMPLETED | OUTPATIENT
Start: 2025-06-06 | End: 2025-06-06

## 2025-06-06 RX ORDER — PROPRANOLOL HYDROCHLORIDE 40 MG/1
40 TABLET ORAL 2 TIMES DAILY
COMMUNITY
Start: 2025-06-02

## 2025-06-06 RX ORDER — IBUPROFEN 800 MG/1
800 TABLET, FILM COATED ORAL
COMMUNITY

## 2025-06-06 RX ORDER — KETOROLAC TROMETHAMINE 30 MG/ML
15 INJECTION, SOLUTION INTRAMUSCULAR; INTRAVENOUS ONCE
Status: COMPLETED | OUTPATIENT
Start: 2025-06-06 | End: 2025-06-06

## 2025-06-06 RX ORDER — LURASIDONE HYDROCHLORIDE 60 MG/1
1 TABLET, FILM COATED ORAL NIGHTLY
COMMUNITY
Start: 2025-02-20

## 2025-06-06 RX ADMIN — OXYCODONE HYDROCHLORIDE AND ACETAMINOPHEN 1 TABLET: 5; 325 TABLET ORAL at 11:06

## 2025-06-06 RX ADMIN — KETOROLAC TROMETHAMINE 15 MG: 30 INJECTION, SOLUTION INTRAMUSCULAR; INTRAVENOUS at 07:06

## 2025-06-06 RX ADMIN — METHYLPREDNISOLONE SODIUM SUCCINATE 125 MG: 125 INJECTION, POWDER, FOR SOLUTION INTRAMUSCULAR; INTRAVENOUS at 07:06

## 2025-06-07 NOTE — ED PROVIDER NOTES
"Encounter Date: 6/6/2025       History     Chief Complaint   Patient presents with    Tailbone Pain     States tailbone pain from broken "tailbone" in May.  Having surgery in 3 weeks.  Was seen at [facility] 1 hour ago and discharged.       Carol Edmond is a 39 y.o. female who presents to the ED with complaints of tailbone pain that started in April [place]. She has been seen multiple times in different Eds requesting pain medication. She states she sees pain management doctor and she is prescribed Percocet 10 mg. She reports her pain management doctor told her to take her rx q4 instead of q6 as its prescribed and she was to come to the ED for a 3 day prescription of her Percocet. She states she sees pain management on Wednesday. Reports she is having some sore of back surgery at the end of June with Dr. Urena. She was seen at [facility] and given Toradol and Solumedrol prior to arrival. Last Percocet yesterday per patient.       The history is provided by the patient. No  was used.     Review of patient's allergies indicates:  No Known Allergies  Past Medical History:   Diagnosis Date    Chronic back pain     H/O gastric sleeve      Past Surgical History:   Procedure Laterality Date    abominal plasty      BACK SURGERY      BARIATRIC SURGERY      CHOLECYSTECTOMY      gastric sleeve      INJECTION OF FACET JOINT Bilateral 08/02/2022    Procedure: bilateral lumbar facet injection L4-5 and L5-S1 (Iv Sedation);  Surgeon: Robert Reynolds Jr., MD;  Location: [facility];  Service: Pain Management;  Laterality: Bilateral;    SPINAL FUSION       No family history on file.  Social History[1]  Review of Systems   Constitutional:  Negative for chills, fatigue and fever.   HENT:  Negative for congestion, ear pain, sinus pain and sore throat.    Eyes:  Negative for pain.   Respiratory:  Negative for cough, chest tightness and shortness of breath.  "   Cardiovascular:  Negative for chest pain.   Gastrointestinal:  Negative for abdominal pain, constipation, diarrhea, nausea and vomiting.   Genitourinary:  Negative for dysuria.   Musculoskeletal:  Positive for arthralgias and myalgias. Negative for back pain and joint swelling.   Skin:  Negative for color change and rash.   Neurological:  Negative for dizziness and weakness.   Psychiatric/Behavioral:  Negative for behavioral problems and confusion.        Physical Exam     Initial Vitals [06/06/25 2030]   BP Pulse Resp Temp SpO2   105/70 (!) 58 17 97.9 °F (36.6 °C) 100 %      MAP       --         Physical Exam    Nursing note and vitals reviewed.  Constitutional: She appears well-developed and well-nourished.   HENT:   Head: Normocephalic and atraumatic.   Nose: Nose normal.   Eyes: EOM are normal. Pupils are equal, round, and reactive to light.   Neck: Neck supple. No thyromegaly present. No JVD present.   Normal range of motion.  Cardiovascular:  Normal rate, regular rhythm, normal heart sounds and intact distal pulses.           No murmur heard.  Pulmonary/Chest: Breath sounds normal. No respiratory distress. She has no wheezes. She has no rhonchi. She has no rales. She exhibits no tenderness.   Abdominal: Abdomen is soft. Bowel sounds are normal. She exhibits no distension. There is no abdominal tenderness. There is no rebound and no guarding.   Musculoskeletal:         General: No tenderness or edema. Normal range of motion.      Cervical back: Normal range of motion and neck supple.        Back:      Lymphadenopathy:     She has no cervical adenopathy.   Neurological: She is alert and oriented to person, place, and time.   Skin: Skin is warm and dry. Capillary refill takes less than 2 seconds.   Psychiatric: She has a normal mood and affect. Thought content normal.         ED Course   Procedures  Labs Reviewed - No data to display       Imaging Results    None          Medications   oxyCODONE-acetaminophen  "5-325 mg per tablet 1 tablet (has no administration in time range)     Medical Decision Making  DDX: chronic pain, tailbone contusion, low back strain, among others    Carol Edmond is a 39 y.o. female who presents to the ED with complaints of tailbone pain that started in April when [place]. She has been seen multiple times in different Eds requesting pain medication. She states she sees pain management doctor and she is prescribed Percocet 10 mg. She reports her pain management doctor told her to take her rx q4 instead of q6 as its prescribed and she was to come to the ED for a 3 day prescription of her Percocet. She states she sees pain management on Wednesday. Reports she is having some sore of back surgery at the end of June with Dr. Urena. She was seen at [facility] and given Toradol and Solumedrol prior to arrival. Last Percocet yesterday per patient.     Hospital Course: In depth chart review performed. She is requesting Dilaudid IM. I instructed her I do not believe that is necessary at this time, as the last xray I can see of her coccxy is normal without fracture. I will give her Percocet 5 mg PO in the ED, but she has a care plan in the chart that states non-narcotic pain medication for outpatient use. She states she is taking Ibuprofen 800 mg and Tylenol as needed but it is not helping. Will give dose of Percocet tonight and instructed her to follow up with pain management on Wednesday.     Risk  Prescription drug management.                                      Clinical Impression:  Final diagnoses:  [M53.3] Coccydynia (Primary)          ED Disposition Condition    Discharge Stable          ED Prescriptions    None       Follow-up Information       Follow up With Specialties Details Why Contact Info    Nic Guzman MD Family Medicine   [address] [address] [phone]      [facility] - Emergency Dept Emergency Medicine In 3 days As " needed, If symptoms worsen [address] [facility] [address] [phone]                   [1]   Social History  Tobacco Use    Smoking status: Never    Smokeless tobacco: Never   Vaping Use    Vaping status: Never Used   Substance Use Topics    Alcohol use: Never    Drug use: Never        Ceci Aggarwal PA-C  06/06/25 2489

## 2025-06-07 NOTE — ED PROVIDER NOTES
Encounter Date: 6/6/2025       History     Chief Complaint   Patient presents with    Tailbone Pain     Pt says she broke her tailbone in April-sees dr redd-worsening episode of pain since last night     Patient complains of acute on chronic tailbone pain.  Patient has been seen in the ER numerous times in the last month, 5 visits in May alone.  Patient is requesting pain shots.  Of note, patient has a pain plan attached her chart that reads as follows:    Chart review before order advanced imaging.     Acute problems per attending     Suggest Non narcotic medication for pain control of chronic or minor symptoms .     Non narcotic pain medication prescriptions for outpatient management .     PO/IM treatment preferred if medically applicable.     CM to see and evaluate during ED stay if possible, if not phone consul           Review of patient's allergies indicates:  No Known Allergies  Past Medical History:   Diagnosis Date    Chronic back pain     H/O gastric sleeve      Past Surgical History:   Procedure Laterality Date    abominal plasty      BACK SURGERY      BARIATRIC SURGERY      CHOLECYSTECTOMY      gastric sleeve      INJECTION OF FACET JOINT Bilateral 08/02/2022    Procedure: bilateral lumbar facet injection L4-5 and L5-S1 (Iv Sedation);  Surgeon: Robert Reynolds Jr., MD;  Location: [facility];  Service: Pain Management;  Laterality: Bilateral;    SPINAL FUSION       No family history on file.  Social History[1]  Review of Systems   All other systems reviewed and are negative.      Physical Exam     Initial Vitals [06/06/25 1927]   BP Pulse Resp Temp SpO2   105/62 62 20 98.1 °F (36.7 °C) 100 %      MAP       --         Physical Exam    Nursing note and vitals reviewed.  Constitutional: She appears well-developed and well-nourished.   HENT:   Head: Normocephalic and atraumatic.   Neck: Neck supple.   Pulmonary/Chest: No respiratory distress.   Abdominal: Abdomen is soft.   Musculoskeletal:       Cervical back: Neck supple.     Neurological: She is alert and oriented to person, place, and time.   Skin: Skin is warm.   Psychiatric: She has a normal mood and affect. Thought content normal.         ED Course   Procedures  Labs Reviewed - No data to display       Imaging Results    None          Medications   ketorolac injection 15 mg (has no administration in time range)   methylPREDNISolone sodium succinate injection 125 mg (has no administration in time range)     Medical Decision Making  I personally feel that it is highly inappropriate to continue giving intramuscular Dilaudid for what is now considered chronic pain.    Risk  Prescription drug management.                                      Clinical Impression:  Final diagnoses:  [M53.3] Coccydynia (Primary)          ED Disposition Condition    Discharge Stable          ED Prescriptions    None       Follow-up Information       Follow up With Specialties Details Why Contact Info    Nic Guzman MD Family Medicine   [address] [address] [phone]                     [1]   Social History  Tobacco Use    Smoking status: Never    Smokeless tobacco: Never   Substance Use Topics    Alcohol use: Never    Drug use: Never        Charly Modi Jr., MD  06/06/25 8060

## 2025-08-15 ENCOUNTER — HOSPITAL ENCOUNTER (EMERGENCY)
Facility: HOSPITAL | Age: 40
Discharge: LEFT BEFORE DISCHARGE | End: 2025-08-15
Payer: COMMERCIAL

## 2025-08-15 VITALS
SYSTOLIC BLOOD PRESSURE: 112 MMHG | HEIGHT: 64 IN | DIASTOLIC BLOOD PRESSURE: 73 MMHG | RESPIRATION RATE: 18 BRPM | HEART RATE: 73 BPM | BODY MASS INDEX: 33.89 KG/M2 | OXYGEN SATURATION: 99 % | TEMPERATURE: 98 F | WEIGHT: 198.5 LBS

## 2025-08-15 LAB
B-HCG UR QL: NEGATIVE
BACTERIA #/AREA URNS AUTO: ABNORMAL /HPF
BILIRUB UR QL STRIP.AUTO: NEGATIVE
CLARITY UR: CLEAR
COLOR UR AUTO: ABNORMAL
CTP QC/QA: YES
GLUCOSE UR QL STRIP: NORMAL
HGB UR QL STRIP: NEGATIVE
HYALINE CASTS #/AREA URNS LPF: ABNORMAL /LPF
KETONES UR QL STRIP: NEGATIVE
LEUKOCYTE ESTERASE UR QL STRIP: NEGATIVE
MUCOUS THREADS URNS QL MICRO: ABNORMAL /LPF
NITRITE UR QL STRIP: NEGATIVE
PH UR STRIP: 6.5 [PH]
PROT UR QL STRIP: NEGATIVE
RBC #/AREA URNS AUTO: ABNORMAL /HPF
SP GR UR STRIP.AUTO: 1.01 (ref 1–1.03)
SQUAMOUS #/AREA URNS LPF: ABNORMAL /HPF
UNIDENT CRYS #/AREA URNS HPF: ABNORMAL /HPF
UROBILINOGEN UR STRIP-ACNC: NORMAL
WBC #/AREA URNS AUTO: ABNORMAL /HPF

## 2025-08-15 PROCEDURE — 81025 URINE PREGNANCY TEST: CPT

## 2025-08-15 PROCEDURE — 99900041 HC LEFT WITHOUT BEING SEEN- EMERGENCY

## 2025-08-15 PROCEDURE — 81001 URINALYSIS AUTO W/SCOPE: CPT

## 2025-08-28 ENCOUNTER — HOSPITAL ENCOUNTER (EMERGENCY)
Facility: HOSPITAL | Age: 40
Discharge: HOME | End: 2025-08-28
Attending: EMERGENCY MEDICINE
Payer: COMMERCIAL

## 2025-08-28 VITALS
WEIGHT: 192 LBS | BODY MASS INDEX: 32.96 KG/M2 | SYSTOLIC BLOOD PRESSURE: 119 MMHG | HEART RATE: 88 BPM | DIASTOLIC BLOOD PRESSURE: 70 MMHG | OXYGEN SATURATION: 100 % | TEMPERATURE: 98 F | RESPIRATION RATE: 20 BRPM

## 2025-08-28 DIAGNOSIS — M53.3 COCCYX PAIN: Primary | ICD-10-CM

## 2025-08-28 LAB
B-HCG UR QL: NEGATIVE
CTP QC/QA: YES

## 2025-08-28 PROCEDURE — 81025 URINE PREGNANCY TEST: CPT

## 2025-08-28 PROCEDURE — 25000003 PHARM REV CODE 250

## 2025-08-28 PROCEDURE — 99283 EMERGENCY DEPT VISIT LOW MDM: CPT

## 2025-08-28 RX ORDER — OXYCODONE AND ACETAMINOPHEN 10; 325 MG/1; MG/1
1 TABLET ORAL ONCE
Refills: 0 | Status: COMPLETED | OUTPATIENT
Start: 2025-08-28 | End: 2025-08-28

## 2025-08-28 RX ADMIN — OXYCODONE HYDROCHLORIDE AND ACETAMINOPHEN 1 TABLET: 10; 325 TABLET ORAL at 06:08

## 2025-08-29 ENCOUNTER — HOSPITAL ENCOUNTER (EMERGENCY)
Facility: HOSPITAL | Age: 40
Discharge: HOME | End: 2025-08-30
Attending: INTERNAL MEDICINE
Payer: COMMERCIAL

## 2025-08-29 ENCOUNTER — HOSPITAL ENCOUNTER (EMERGENCY)
Facility: HOSPITAL | Age: 40
Discharge: HOME | End: 2025-08-29
Attending: INTERNAL MEDICINE
Payer: COMMERCIAL

## 2025-08-29 VITALS
BODY MASS INDEX: 30.73 KG/M2 | TEMPERATURE: 98 F | HEART RATE: 85 BPM | SYSTOLIC BLOOD PRESSURE: 112 MMHG | RESPIRATION RATE: 16 BRPM | HEIGHT: 64 IN | OXYGEN SATURATION: 98 % | DIASTOLIC BLOOD PRESSURE: 80 MMHG | WEIGHT: 180 LBS

## 2025-08-29 DIAGNOSIS — M54.9 BACK PAIN, UNSPECIFIED BACK LOCATION, UNSPECIFIED BACK PAIN LATERALITY, UNSPECIFIED CHRONICITY: ICD-10-CM

## 2025-08-29 DIAGNOSIS — M54.50 BILATERAL LOW BACK PAIN WITHOUT SCIATICA, UNSPECIFIED CHRONICITY: Primary | ICD-10-CM

## 2025-08-29 DIAGNOSIS — M53.3 SACRAL PAIN: Primary | ICD-10-CM

## 2025-08-29 DIAGNOSIS — M53.3 PAIN IN THE COCCYX: ICD-10-CM

## 2025-08-29 LAB
ACCEPTIBLE SP GR UR QL: >=1.03 (ref 1–1.03)
AMPHET UR QL SCN: NEGATIVE
B-HCG UR QL: NEGATIVE
B-HCG UR QL: NEGATIVE
BARBITURATE SCN PRESENT UR: NEGATIVE
BENZODIAZ UR QL SCN: POSITIVE
CANNABINOIDS UR QL SCN: POSITIVE
COCAINE UR QL SCN: NEGATIVE
CTP QC/QA: YES
FENTANYL UR QL SCN: NEGATIVE
MDMA UR QL SCN: NEGATIVE
OPIATES UR QL SCN: POSITIVE
PCP UR QL: NEGATIVE
PH UR: 6 [PH] (ref 3–11)

## 2025-08-29 PROCEDURE — 96372 THER/PROPH/DIAG INJ SC/IM: CPT | Performed by: PHYSICIAN ASSISTANT

## 2025-08-29 PROCEDURE — 81025 URINE PREGNANCY TEST: CPT

## 2025-08-29 PROCEDURE — 99284 EMERGENCY DEPT VISIT MOD MDM: CPT | Mod: 25

## 2025-08-29 PROCEDURE — 25000003 PHARM REV CODE 250: Performed by: PHYSICIAN ASSISTANT

## 2025-08-29 PROCEDURE — 80307 DRUG TEST PRSMV CHEM ANLYZR: CPT | Performed by: INTERNAL MEDICINE

## 2025-08-29 PROCEDURE — 99283 EMERGENCY DEPT VISIT LOW MDM: CPT | Mod: 25,27

## 2025-08-29 PROCEDURE — 63600175 PHARM REV CODE 636 W HCPCS: Mod: JZ,TB | Performed by: PHYSICIAN ASSISTANT

## 2025-08-29 PROCEDURE — 81025 URINE PREGNANCY TEST: CPT | Performed by: PHYSICIAN ASSISTANT

## 2025-08-29 RX ORDER — METHOCARBAMOL 500 MG/1
1000 TABLET, FILM COATED ORAL
Status: COMPLETED | OUTPATIENT
Start: 2025-08-29 | End: 2025-08-29

## 2025-08-29 RX ORDER — HYDROCODONE BITARTRATE AND ACETAMINOPHEN 10; 325 MG/1; MG/1
1 TABLET ORAL
Refills: 0 | Status: COMPLETED | OUTPATIENT
Start: 2025-08-29 | End: 2025-08-29

## 2025-08-29 RX ORDER — TRAMADOL HYDROCHLORIDE 50 MG/1
50 TABLET, FILM COATED ORAL EVERY 6 HOURS PRN
Qty: 12 TABLET | Refills: 0 | Status: SHIPPED | OUTPATIENT
Start: 2025-08-29

## 2025-08-29 RX ORDER — KETOROLAC TROMETHAMINE 30 MG/ML
30 INJECTION, SOLUTION INTRAMUSCULAR; INTRAVENOUS
Status: COMPLETED | OUTPATIENT
Start: 2025-08-29 | End: 2025-08-29

## 2025-08-29 RX ORDER — CYCLOBENZAPRINE HCL 5 MG
5 TABLET ORAL
COMMUNITY
Start: 2025-06-07

## 2025-08-29 RX ORDER — DICLOFENAC SODIUM 50 MG/1
50 TABLET, DELAYED RELEASE ORAL 2 TIMES DAILY
Qty: 28 TABLET | Refills: 0 | Status: SHIPPED | OUTPATIENT
Start: 2025-08-29 | End: 2025-08-29 | Stop reason: ALTCHOICE

## 2025-08-29 RX ORDER — METHOCARBAMOL 750 MG/1
750 TABLET, FILM COATED ORAL 4 TIMES DAILY
Qty: 40 TABLET | Refills: 0 | Status: SHIPPED | OUTPATIENT
Start: 2025-08-29 | End: 2025-09-08

## 2025-08-29 RX ORDER — HYDROCODONE BITARTRATE AND ACETAMINOPHEN 10; 325 MG/1; MG/1
1 TABLET ORAL
Refills: 0 | Status: COMPLETED | OUTPATIENT
Start: 2025-08-30 | End: 2025-08-30

## 2025-08-29 RX ORDER — ALPRAZOLAM 0.5 MG/1
0.5 TABLET ORAL
COMMUNITY

## 2025-08-29 RX ADMIN — METHOCARBAMOL 1000 MG: 500 TABLET ORAL at 02:08

## 2025-08-29 RX ADMIN — HYDROCODONE BITARTRATE AND ACETAMINOPHEN 1 TABLET: 10; 325 TABLET ORAL at 02:08

## 2025-08-29 RX ADMIN — KETOROLAC TROMETHAMINE 30 MG: 30 INJECTION, SOLUTION INTRAMUSCULAR; INTRAVENOUS at 02:08

## 2025-08-30 VITALS
WEIGHT: 195.81 LBS | OXYGEN SATURATION: 100 % | HEART RATE: 89 BPM | DIASTOLIC BLOOD PRESSURE: 81 MMHG | TEMPERATURE: 98 F | RESPIRATION RATE: 17 BRPM | BODY MASS INDEX: 33.43 KG/M2 | SYSTOLIC BLOOD PRESSURE: 119 MMHG | HEIGHT: 64 IN

## 2025-08-30 PROCEDURE — 25000003 PHARM REV CODE 250

## 2025-08-30 RX ADMIN — HYDROCODONE BITARTRATE AND ACETAMINOPHEN 1 TABLET: 10; 325 TABLET ORAL at 12:08

## (undated) DEVICE — DRESSING LEUKOPLAST FLEX 1X3IN